# Patient Record
Sex: FEMALE | Race: WHITE | Employment: FULL TIME | URBAN - NONMETROPOLITAN AREA
[De-identification: names, ages, dates, MRNs, and addresses within clinical notes are randomized per-mention and may not be internally consistent; named-entity substitution may affect disease eponyms.]

---

## 2017-10-12 LAB
CHOLESTEROL, TOTAL: 229 MG/DL
CHOLESTEROL/HDL RATIO: NORMAL
HDLC SERPL-MCNC: 70 MG/DL (ref 35–70)
LDL CHOLESTEROL CALCULATED: 127 MG/DL (ref 0–160)
TRIGL SERPL-MCNC: 158 MG/DL
VLDLC SERPL CALC-MCNC: 32 MG/DL

## 2018-02-01 ENCOUNTER — HOSPITAL ENCOUNTER (EMERGENCY)
Age: 53
Discharge: HOME | End: 2018-02-01
Payer: COMMERCIAL

## 2018-02-01 VITALS
SYSTOLIC BLOOD PRESSURE: 135 MMHG | TEMPERATURE: 97.7 F | BODY MASS INDEX: 53.14 KG/M2 | HEART RATE: 65 BPM | WEIGHT: 293 LBS | RESPIRATION RATE: 16 BRPM | OXYGEN SATURATION: 97 % | DIASTOLIC BLOOD PRESSURE: 73 MMHG

## 2018-02-01 DIAGNOSIS — J20.9 ACUTE BRONCHITIS, UNSPECIFIED ORGANISM: Primary | ICD-10-CM

## 2018-02-01 PROCEDURE — 99202 OFFICE O/P NEW SF 15 MIN: CPT

## 2018-02-01 PROCEDURE — 99203 OFFICE O/P NEW LOW 30 MIN: CPT | Performed by: NURSE PRACTITIONER

## 2018-02-01 RX ORDER — AZITHROMYCIN 250 MG/1
TABLET, FILM COATED ORAL
Qty: 1 PACKET | Refills: 0 | Status: SHIPPED | OUTPATIENT
Start: 2018-02-01 | End: 2018-02-11

## 2018-02-01 RX ORDER — BENZONATATE 100 MG/1
100 CAPSULE ORAL 3 TIMES DAILY PRN
Qty: 21 CAPSULE | Refills: 0 | Status: SHIPPED | OUTPATIENT
Start: 2018-02-01 | End: 2018-02-08

## 2018-02-01 ASSESSMENT — ENCOUNTER SYMPTOMS
VOMITING: 0
SORE THROAT: 1
SHORTNESS OF BREATH: 0
SINUS PRESSURE: 1
DIARRHEA: 0
COUGH: 1

## 2018-02-01 ASSESSMENT — PAIN SCALES - GENERAL: PAINLEVEL_OUTOF10: 3

## 2018-02-01 NOTE — ED PROVIDER NOTES
MG TABLET    Take 500 mg by mouth 2 times daily (with meals)    ONDANSETRON (ZOFRAN) 4 MG TABLET    Take 1 tablet by mouth daily as needed for Nausea or Vomiting    PSEUDOEPHEDRINE (SUDAFED 12 HR) 120 MG EXTENDED RELEASE TABLET    Take 120 mg by mouth every 12 hours       ALLERGIES     Patient is is allergic to ampicillin; bactrim [sulfamethoxazole-trimethoprim]; and flagyl [metronidazole]. Patients   There is no immunization history on file for this patient. FAMILY HISTORY     Patient's family history is not on file. SOCIAL HISTORY     Patient  reports that she has never smoked. She has never used smokeless tobacco.    PHYSICAL EXAM     ED TRIAGE VITALS  BP: 135/73, Temp: 97.7 °F (36.5 °C), Pulse: 65, Resp: 16, SpO2: 97 %,Estimated body mass index is 53.14 kg/m² as calculated from the following:    Height as of 11/7/16: 5' 3\" (1.6 m). Weight as of this encounter: 300 lb (136.1 kg). ,Patient's last menstrual period was 01/22/2018. Physical Exam   Constitutional: She is oriented to person, place, and time. She appears well-developed and well-nourished. She is cooperative. She appears ill. HENT:   Head: Normocephalic and atraumatic. Right Ear: Hearing, tympanic membrane, external ear and ear canal normal.   Left Ear: Hearing, tympanic membrane, external ear and ear canal normal.   Nose: Right sinus exhibits no maxillary sinus tenderness and no frontal sinus tenderness. Left sinus exhibits no maxillary sinus tenderness and no frontal sinus tenderness. Mouth/Throat: Uvula is midline and mucous membranes are normal. Posterior oropharyngeal erythema present. Neck: Neck supple. Cardiovascular: Normal rate, regular rhythm and normal heart sounds. Pulmonary/Chest: Effort normal and breath sounds normal. No respiratory distress. She has no wheezes. Abdominal: Soft.    Lymphadenopathy:        Head (right side): No submental, no submandibular, no tonsillar, no preauricular, no posterior auricular and

## 2018-02-01 NOTE — ED TRIAGE NOTES
Pt states she has been sick for 3 wks getting better and worse, felt worse yesterday but couldn't come in

## 2018-02-02 ASSESSMENT — ENCOUNTER SYMPTOMS
NAUSEA: 1
SINUS PAIN: 0
CHEST TIGHTNESS: 1
RHINORRHEA: 0

## 2018-09-15 ENCOUNTER — HOSPITAL ENCOUNTER (EMERGENCY)
Age: 53
Discharge: HOME | End: 2018-09-15
Payer: COMMERCIAL

## 2018-09-15 ENCOUNTER — APPOINTMENT (OUTPATIENT)
Dept: CT IMAGING | Age: 53
End: 2018-09-15
Payer: COMMERCIAL

## 2018-09-15 ENCOUNTER — APPOINTMENT (OUTPATIENT)
Dept: GENERAL RADIOLOGY | Age: 53
End: 2018-09-15
Payer: COMMERCIAL

## 2018-09-15 VITALS
SYSTOLIC BLOOD PRESSURE: 124 MMHG | HEART RATE: 74 BPM | RESPIRATION RATE: 18 BRPM | TEMPERATURE: 98 F | OXYGEN SATURATION: 97 % | BODY MASS INDEX: 53.14 KG/M2 | DIASTOLIC BLOOD PRESSURE: 76 MMHG | WEIGHT: 293 LBS

## 2018-09-15 DIAGNOSIS — R00.2 PALPITATIONS: Primary | ICD-10-CM

## 2018-09-15 LAB
ALBUMIN SERPL-MCNC: 4.2 G/DL (ref 3.5–5.1)
ALP BLD-CCNC: 113 U/L (ref 38–126)
ALT SERPL-CCNC: 13 U/L (ref 11–66)
AMPHETAMINE+METHAMPHETAMINE URINE SCREEN: POSITIVE
ANION GAP SERPL CALCULATED.3IONS-SCNC: 16 MEQ/L (ref 8–16)
APTT: 27 SECONDS (ref 22–38)
AST SERPL-CCNC: 19 U/L (ref 5–40)
BACTERIA: ABNORMAL /HPF
BARBITURATE QUANTITATIVE URINE: NEGATIVE
BASOPHILS # BLD: 0.6 %
BASOPHILS ABSOLUTE: 0.1 THOU/MM3 (ref 0–0.1)
BENZODIAZEPINE QUANTITATIVE URINE: NEGATIVE
BILIRUB SERPL-MCNC: 0.2 MG/DL (ref 0.3–1.2)
BILIRUBIN URINE: ABNORMAL
BLOOD, URINE: NEGATIVE
BUN BLDV-MCNC: 14 MG/DL (ref 7–22)
CALCIUM SERPL-MCNC: 9.1 MG/DL (ref 8.5–10.5)
CANNABINOID QUANTITATIVE URINE: NEGATIVE
CASTS 2: ABNORMAL /LPF
CASTS UA: ABNORMAL /LPF
CHARACTER, URINE: ABNORMAL
CHLORIDE BLD-SCNC: 103 MEQ/L (ref 98–111)
CO2: 22 MEQ/L (ref 23–33)
COCAINE METABOLITE QUANTITATIVE URINE: NEGATIVE
COLOR: ABNORMAL
CREAT SERPL-MCNC: 0.7 MG/DL (ref 0.4–1.2)
CRYSTALS, UA: ABNORMAL
D-DIMER QUANTITATIVE: 6485 NG/ML FEU (ref 0–500)
EKG ATRIAL RATE: 109 BPM
EKG P AXIS: 59 DEGREES
EKG P-R INTERVAL: 176 MS
EKG Q-T INTERVAL: 332 MS
EKG QRS DURATION: 78 MS
EKG QTC CALCULATION (BAZETT): 447 MS
EKG R AXIS: -13 DEGREES
EKG T AXIS: 40 DEGREES
EKG VENTRICULAR RATE: 109 BPM
EOSINOPHIL # BLD: 1.3 %
EOSINOPHILS ABSOLUTE: 0.2 THOU/MM3 (ref 0–0.4)
EPITHELIAL CELLS, UA: ABNORMAL /HPF
ERYTHROCYTE [DISTWIDTH] IN BLOOD BY AUTOMATED COUNT: 13.5 % (ref 11.5–14.5)
ERYTHROCYTE [DISTWIDTH] IN BLOOD BY AUTOMATED COUNT: 43 FL (ref 35–45)
GFR SERPL CREATININE-BSD FRML MDRD: 87 ML/MIN/1.73M2
GLUCOSE BLD-MCNC: 101 MG/DL (ref 70–108)
GLUCOSE URINE: NEGATIVE MG/DL
HCT VFR BLD CALC: 42.4 % (ref 37–47)
HEMOGLOBIN: 14.1 GM/DL (ref 12–16)
ICTOTEST: NEGATIVE
IMMATURE GRANS (ABS): 0.05 THOU/MM3 (ref 0–0.07)
IMMATURE GRANULOCYTES: 0.4 %
KETONES, URINE: NEGATIVE
LEUKOCYTE ESTERASE, URINE: ABNORMAL
LYMPHOCYTES # BLD: 14.1 %
LYMPHOCYTES ABSOLUTE: 1.6 THOU/MM3 (ref 1–4.8)
MCH RBC QN AUTO: 29.1 PG (ref 26–33)
MCHC RBC AUTO-ENTMCNC: 33.3 GM/DL (ref 32.2–35.5)
MCV RBC AUTO: 87.6 FL (ref 81–99)
MISCELLANEOUS 2: ABNORMAL
MONOCYTES # BLD: 6 %
MONOCYTES ABSOLUTE: 0.7 THOU/MM3 (ref 0.4–1.3)
MUCUS: ABNORMAL
NITRITE, URINE: NEGATIVE
NUCLEATED RED BLOOD CELLS: 0 /100 WBC
OPIATES, URINE: NEGATIVE
OSMOLALITY CALCULATION: 281.9 MOSMOL/KG (ref 275–300)
OXYCODONE: NEGATIVE
PH UA: 5.5
PHENCYCLIDINE QUANTITATIVE URINE: NEGATIVE
PLATELET # BLD: 377 THOU/MM3 (ref 130–400)
PMV BLD AUTO: 9.2 FL (ref 9.4–12.4)
POTASSIUM SERPL-SCNC: 4.5 MEQ/L (ref 3.5–5.2)
PRO-BNP: 129.8 PG/ML (ref 0–900)
PROTEIN UA: 30
RBC # BLD: 4.84 MILL/MM3 (ref 4.2–5.4)
RBC URINE: ABNORMAL /HPF
RENAL EPITHELIAL, UA: ABNORMAL
SEG NEUTROPHILS: 77.6 %
SEGMENTED NEUTROPHILS ABSOLUTE COUNT: 9 THOU/MM3 (ref 1.8–7.7)
SODIUM BLD-SCNC: 141 MEQ/L (ref 135–145)
SPECIFIC GRAVITY, URINE: 1.02 (ref 1–1.03)
TOTAL PROTEIN: 7 G/DL (ref 6.1–8)
TROPONIN T: < 0.01 NG/ML
TSH SERPL DL<=0.05 MIU/L-ACNC: 2.92 UIU/ML (ref 0.4–4.2)
UROBILINOGEN, URINE: 0.2 EU/DL
WBC # BLD: 11.6 THOU/MM3 (ref 4.8–10.8)
WBC UA: ABNORMAL /HPF
YEAST: ABNORMAL

## 2018-09-15 PROCEDURE — 71045 X-RAY EXAM CHEST 1 VIEW: CPT

## 2018-09-15 PROCEDURE — 93005 ELECTROCARDIOGRAM TRACING: CPT | Performed by: PHYSICIAN ASSISTANT

## 2018-09-15 PROCEDURE — 2580000003 HC RX 258: Performed by: PHYSICIAN ASSISTANT

## 2018-09-15 PROCEDURE — 71275 CT ANGIOGRAPHY CHEST: CPT

## 2018-09-15 PROCEDURE — 83880 ASSAY OF NATRIURETIC PEPTIDE: CPT

## 2018-09-15 PROCEDURE — 93226 XTRNL ECG REC<48 HR SCAN A/R: CPT

## 2018-09-15 PROCEDURE — 84484 ASSAY OF TROPONIN QUANT: CPT

## 2018-09-15 PROCEDURE — 93225 XTRNL ECG REC<48 HRS REC: CPT

## 2018-09-15 PROCEDURE — 80053 COMPREHEN METABOLIC PANEL: CPT

## 2018-09-15 PROCEDURE — 99285 EMERGENCY DEPT VISIT HI MDM: CPT

## 2018-09-15 PROCEDURE — 80307 DRUG TEST PRSMV CHEM ANLYZR: CPT

## 2018-09-15 PROCEDURE — 85730 THROMBOPLASTIN TIME PARTIAL: CPT

## 2018-09-15 PROCEDURE — 81001 URINALYSIS AUTO W/SCOPE: CPT

## 2018-09-15 PROCEDURE — 6360000004 HC RX CONTRAST MEDICATION: Performed by: PHYSICIAN ASSISTANT

## 2018-09-15 PROCEDURE — 87086 URINE CULTURE/COLONY COUNT: CPT

## 2018-09-15 PROCEDURE — 85025 COMPLETE CBC W/AUTO DIFF WBC: CPT

## 2018-09-15 PROCEDURE — 36415 COLL VENOUS BLD VENIPUNCTURE: CPT

## 2018-09-15 PROCEDURE — 84443 ASSAY THYROID STIM HORMONE: CPT

## 2018-09-15 PROCEDURE — 85379 FIBRIN DEGRADATION QUANT: CPT

## 2018-09-15 RX ORDER — 0.9 % SODIUM CHLORIDE 0.9 %
1000 INTRAVENOUS SOLUTION INTRAVENOUS ONCE
Status: COMPLETED | OUTPATIENT
Start: 2018-09-15 | End: 2018-09-15

## 2018-09-15 RX ADMIN — IOPAMIDOL 80 ML: 755 INJECTION, SOLUTION INTRAVENOUS at 21:01

## 2018-09-15 RX ADMIN — SODIUM CHLORIDE 1000 ML: 9 INJECTION, SOLUTION INTRAVENOUS at 19:30

## 2018-09-15 ASSESSMENT — ENCOUNTER SYMPTOMS
COUGH: 0
EYE REDNESS: 0
RHINORRHEA: 0
WHEEZING: 0
VOMITING: 0
CONSTIPATION: 0
BACK PAIN: 0
SORE THROAT: 0
COLOR CHANGE: 0
DIARRHEA: 0
SHORTNESS OF BREATH: 0
EYE DISCHARGE: 0
ABDOMINAL PAIN: 0
NAUSEA: 0

## 2018-09-15 NOTE — ED TRIAGE NOTES
Patient was at work at about 0 patient felt hot and became diaphortic. Patient states she took her pulse it was in the 180's with only coming down to 125. Patient is alert and oriented. EKG completed. Call light in reach.

## 2018-09-15 NOTE — ED PROVIDER NOTES
for arthralgias, back pain, myalgias, neck pain and neck stiffness. Skin: Negative for color change, pallor and rash. Neurological: Negative for dizziness, syncope, weakness, light-headedness, numbness and headaches. Hematological: Negative for adenopathy. Psychiatric/Behavioral: Negative for agitation, confusion, dysphoric mood and suicidal ideas. The patient is not nervous/anxious. PAST MEDICAL HISTORY    has a past medical history of Hypertension. SURGICAL HISTORY      has a past surgical history that includes Cholecystectomy. CURRENT MEDICATIONS       Previous Medications    DESOGESTREL-ETHINYL ESTRADIOL (KARIVA) 0.15-0.02/0.01 MG (21/5) PER TABLET    Take 1 tablet by mouth daily    FAMOTIDINE (PEPCID) 40 MG TABLET    Take 40 mg by mouth 2 times daily    GUAIFENESIN (MUCINEX) 600 MG EXTENDED RELEASE TABLET    Take 1,200 mg by mouth 2 times daily    LISINOPRIL (PRINIVIL;ZESTRIL) 20 MG TABLET    Take 20 mg by mouth daily    NAPROXEN (NAPROSYN) 500 MG TABLET    Take 500 mg by mouth 2 times daily (with meals)    ONDANSETRON (ZOFRAN) 4 MG TABLET    Take 1 tablet by mouth daily as needed for Nausea or Vomiting    PSEUDOEPHEDRINE (SUDAFED 12 HR) 120 MG EXTENDED RELEASE TABLET    Take 120 mg by mouth every 12 hours       ALLERGIES     is allergic to ampicillin; bactrim [sulfamethoxazole-trimethoprim]; and flagyl [metronidazole]. FAMILY HISTORY     has no family status information on file. family history is not on file. SOCIAL HISTORY      reports that she has never smoked. She has never used smokeless tobacco.    PHYSICAL EXAM     INITIAL VITALS:  weight is 300 lb (136.1 kg). Her oral temperature is 98 °F (36.7 °C). Her blood pressure is 123/81 and her pulse is 76. Her respiration is 17 and oxygen saturation is 98%. Physical Exam   Constitutional: She is oriented to person, place, and time. She appears well-developed and well-nourished. No distress.    HENT:   Head: Normocephalic and

## 2018-09-16 LAB
ORGANISM: ABNORMAL
URINE CULTURE REFLEX: ABNORMAL

## 2018-09-16 PROCEDURE — 93010 ELECTROCARDIOGRAM REPORT: CPT | Performed by: INTERNAL MEDICINE

## 2018-09-16 NOTE — ED NOTES
Pt is resting quietly on cart with call light in reach. Pt is updated on POC and pending CTA. Will continue to monitor the patient.      Ros Olson RN  09/15/18 1163

## 2018-10-05 ENCOUNTER — OFFICE VISIT (OUTPATIENT)
Dept: CARDIOLOGY CLINIC | Age: 53
End: 2018-10-05
Payer: COMMERCIAL

## 2018-10-05 VITALS
HEIGHT: 63 IN | HEART RATE: 71 BPM | WEIGHT: 293 LBS | DIASTOLIC BLOOD PRESSURE: 86 MMHG | SYSTOLIC BLOOD PRESSURE: 132 MMHG | BODY MASS INDEX: 51.91 KG/M2

## 2018-10-05 DIAGNOSIS — I10 ESSENTIAL HYPERTENSION: ICD-10-CM

## 2018-10-05 DIAGNOSIS — R00.2 INTERMITTENT PALPITATIONS: ICD-10-CM

## 2018-10-05 DIAGNOSIS — R06.02 SOB (SHORTNESS OF BREATH) ON EXERTION: Primary | ICD-10-CM

## 2018-10-05 PROBLEM — E66.01 OBESITY, MORBID (HCC): Status: ACTIVE | Noted: 2018-10-05

## 2018-10-05 PROCEDURE — 99204 OFFICE O/P NEW MOD 45 MIN: CPT | Performed by: INTERNAL MEDICINE

## 2018-10-05 RX ORDER — FAMOTIDINE 20 MG/1
20 TABLET, FILM COATED ORAL EVERY MORNING
COMMUNITY
End: 2021-07-01 | Stop reason: ALTCHOICE

## 2018-10-05 NOTE — PROGRESS NOTES
Chief Complaint   Patient presents with    Results     holter [facility]    Establish Cardiologist   Pt here to discuss results of holter monitor  Seen in ER sept 15, for high HR/palpitation 180 bpm    Denied cp, sob or dizziness or edema    Taking psueofed qd for chronic sinusitis      Occasional palpitations    Sob on exertion    NO cp    nevere smoked    FHx    NO cad  Ankle had cad at age 35 with CABG      Patient Active Problem List   Diagnosis    Granulomatous hepatitis    SOB (shortness of breath) on exertion    Intermittent palpitations    Essential hypertension    Obesity, morbid ([address]       Past Surgical History:   Procedure Laterality Date    CHOLECYSTECTOMY         Allergies   Allergen Reactions    Ampicillin     Bactrim [Sulfamethoxazole-Trimethoprim]     Flagyl [Metronidazole]         History reviewed. No pertinent family history. Social History     Social History    Marital status:      Spouse name: N/A    Number of children: N/A    Years of education: N/A     Occupational History    Not on file.      Social History Main Topics    Smoking status: Never Smoker    Smokeless tobacco: Never Used    Alcohol use Not on file    Drug use: Unknown    Sexual activity: Not on file     Other Topics Concern    Not on file     Social History Narrative    No narrative on file       Current Outpatient Prescriptions   Medication Sig Dispense Refill    famotidine (PEPCID) 20 MG tablet Take 20 mg by mouth every morning      lisinopril (PRINIVIL;ZESTRIL) 20 MG tablet Take 20 mg by mouth daily      naproxen (NAPROSYN) 500 MG tablet Take 500 mg by mouth 2 times daily (with meals)      pseudoephedrine (SUDAFED 12 HR) 120 MG extended release tablet Take 120 mg by mouth every 12 hours      desogestrel-ethinyl estradiol (KARIVA) 0.15-0.02/0.01 MG (21/5) per tablet Take 1 tablet by mouth daily      famotidine (PEPCID) 40 MG tablet Take 40 mg by mouth nightly       guaiFENesin (MUCINEX) 600 MG 09/15/2018    BUN 14 09/15/2018    LABALBU 4.2 09/15/2018    CREATININE 0.7 09/15/2018    CALCIUM 9.1 09/15/2018    LABGLOM 87 09/15/2018    GLUCOSE 101 09/15/2018     Hepatic Function Panel:    Lab Results   Component Value Date    ALKPHOS 113 09/15/2018    ALT 13 09/15/2018    AST 19 09/15/2018    PROT 7.0 09/15/2018    BILITOT 0.2 09/15/2018    LABALBU 4.2 09/15/2018     Magnesium:  No results found for: MG  Warfarin PT/INR:  No components found for: PTPATWAR, PTINRWAR  HgBA1c:  No results found for: LABA1C  FLP:  No results found for: TRIG, HDL, LDLCALC, LDLDIRECT, LABVLDL  TSH:    Lab Results   Component Value Date    TSH 2.920 09/15/2018     Sinus tachycardia 109/min  Low voltage QRS, consider pulmonary disease, pericardial effusion, or normal variant  Borderline ECG  No previous ECGs available  Confirmed by Landon Sullivan MD, Yajaira Champion ([id number]) on 9/16/2018 6:57:51 PM         CONCLUSION:  This is a normal sinus rhythm. Average heart rate 82 beats  per minute, ranging from 49 to 150 beats per minute. No significant pause  of more than 1.5 seconds noted. Rare ventricular ectopic beats are  isolated and couplets total of 24. Rare supraventricular ectopic beats,  total of 134 isolated and one supraventricular ectopic run composed of 3  beats, rate 168 beats per minute. No other form of arrhythmia. Overall,  this is a benign Holter monitor finding. Diary was presented. This Holter  does not explain the palpitation of the patient.     DEEJAY Sullivan M.D.     D: 09/24/2018 18:39:40       Assessment   Diagnosis Orders   1. SOB (shortness of breath) on exertion  ECHO Complete 2D W Doppler W Color    NM MYOCARDIAL SPECT REST EXERCISE OR RX   2. Intermittent palpitations  ECHO Complete 2D W Doppler W Color    NM MYOCARDIAL SPECT REST EXERCISE OR RX   3. Essential hypertension  ECHO Complete 2D W Doppler W Color    NM MYOCARDIAL SPECT REST EXERCISE OR RX   4.  Obesity, morbid ([address]  NM MYOCARDIAL SPECT REST EXERCISE OR RX

## 2018-10-15 ENCOUNTER — HOSPITAL ENCOUNTER (OUTPATIENT)
Dept: NON INVASIVE DIAGNOSTICS | Age: 53
Discharge: HOME | End: 2018-10-15
Payer: COMMERCIAL

## 2018-10-15 VITALS — BODY MASS INDEX: 51.91 KG/M2 | WEIGHT: 293 LBS | HEIGHT: 63 IN

## 2018-10-15 DIAGNOSIS — I10 ESSENTIAL HYPERTENSION: ICD-10-CM

## 2018-10-15 DIAGNOSIS — R06.02 SOB (SHORTNESS OF BREATH) ON EXERTION: ICD-10-CM

## 2018-10-15 DIAGNOSIS — R00.2 INTERMITTENT PALPITATIONS: ICD-10-CM

## 2018-10-15 LAB
LV EF: 63 %
LV EF: 73 %
LVEF MODALITY: NORMAL
LVEF MODALITY: NORMAL

## 2018-10-15 PROCEDURE — A9500 TC99M SESTAMIBI: HCPCS | Performed by: INTERNAL MEDICINE

## 2018-10-15 PROCEDURE — 93306 TTE W/DOPPLER COMPLETE: CPT

## 2018-10-15 PROCEDURE — 2709999900 HC NON-CHARGEABLE SUPPLY

## 2018-10-15 PROCEDURE — 3430000000 HC RX DIAGNOSTIC RADIOPHARMACEUTICAL: Performed by: INTERNAL MEDICINE

## 2018-10-15 PROCEDURE — 93017 CV STRESS TEST TRACING ONLY: CPT

## 2018-10-15 PROCEDURE — 78452 HT MUSCLE IMAGE SPECT MULT: CPT

## 2018-10-15 RX ADMIN — Medication 9.4 MILLICURIE: at 09:55

## 2018-10-15 RX ADMIN — Medication 31.7 MILLICURIE: at 11:06

## 2018-11-12 ENCOUNTER — OFFICE VISIT (OUTPATIENT)
Dept: CARDIOLOGY CLINIC | Age: 53
End: 2018-11-12
Payer: COMMERCIAL

## 2018-11-12 VITALS
WEIGHT: 293 LBS | BODY MASS INDEX: 51.91 KG/M2 | DIASTOLIC BLOOD PRESSURE: 74 MMHG | HEIGHT: 63 IN | HEART RATE: 66 BPM | SYSTOLIC BLOOD PRESSURE: 129 MMHG

## 2018-11-12 DIAGNOSIS — R00.2 INTERMITTENT PALPITATIONS: Primary | ICD-10-CM

## 2018-11-12 DIAGNOSIS — Z01.818 PRE-OP EVALUATION: ICD-10-CM

## 2018-11-12 DIAGNOSIS — E66.01 OBESITY, MORBID (HCC): ICD-10-CM

## 2018-11-12 DIAGNOSIS — I10 ESSENTIAL HYPERTENSION: ICD-10-CM

## 2018-11-12 PROBLEM — R06.02 SOB (SHORTNESS OF BREATH) ON EXERTION: Status: RESOLVED | Noted: 2018-10-05 | Resolved: 2018-11-12

## 2018-11-12 PROCEDURE — 99213 OFFICE O/P EST LOW 20 MIN: CPT | Performed by: INTERNAL MEDICINE

## 2018-12-04 ENCOUNTER — ANESTHESIA EVENT (OUTPATIENT)
Dept: OPERATING ROOM | Age: 53
End: 2018-12-04
Payer: COMMERCIAL

## 2018-12-05 NOTE — ANESTHESIA PRE PROCEDURE
Department of Anesthesiology  Preprocedure Note       Name:  Bob Burch   Age:  46 y.o.  :  1965                                          MRN:  [id number]         Date:  2018      Surgeon: Kt Guevara):  [place] Mera MD    Procedure: SINUS ENDOSCOPY FUNCTIONAL SURGERY BILATERAL MAXILLARY ANSTROSTOMY WITH TISSUE REMOVAL (Bilateral )  SUBMUCOUS RESECTION TURBINOPLASTY  BILATERAL (Bilateral )    Medications prior to admission:   Prior to Admission medications    Medication Sig Start Date End Date Taking?  Authorizing Provider   famotidine (PEPCID) 20 MG tablet Take 20 mg by mouth every morning    Historical Provider, MD   lisinopril (PRINIVIL;ZESTRIL) 20 MG tablet Take 20 mg by mouth daily    Historical Provider, MD   naproxen (NAPROSYN) 500 MG tablet Take 500 mg by mouth 2 times daily (with meals)    Historical Provider, MD   pseudoephedrine (SUDAFED 12 HR) 120 MG extended release tablet Take 120 mg by mouth daily     Historical Provider, MD   desogestrel-ethinyl estradiol (Yung Olman) 0.15-0.02/0.01 MG () per tablet Take 1 tablet by mouth daily    Historical Provider, MD   famotidine (PEPCID) 40 MG tablet Take 40 mg by mouth nightly     Historical Provider, MD   guaiFENesin (MUCINEX) 600 MG extended release tablet Take 1,200 mg by mouth 2 times daily    Historical Provider, MD       Current medications:    Current Outpatient Prescriptions   Medication Sig Dispense Refill    famotidine (PEPCID) 20 MG tablet Take 20 mg by mouth every morning      lisinopril (PRINIVIL;ZESTRIL) 20 MG tablet Take 20 mg by mouth daily      naproxen (NAPROSYN) 500 MG tablet Take 500 mg by mouth 2 times daily (with meals)      pseudoephedrine (SUDAFED 12 HR) 120 MG extended release tablet Take 120 mg by mouth daily       desogestrel-ethinyl estradiol (KARIVA) 0.15-0.02/0.01 MG () per tablet Take 1 tablet by mouth daily      famotidine (PEPCID) 40 MG tablet Take 40 mg by mouth nightly       guaiFENesin (MUCINEX) 600 Dental:          Pulmonary:   (+) pneumonia (2017, bronchitis 2018):  shortness of breath:                            ROS comment: Sinus infections x 30 years. Positive  Meth-Amphetamine screen 2018. Reports has taken pseudoephedrine for years and drug screening will always be positive. 2018 negative drug screen     Non smoker   Cardiovascular:    (+) hypertension:, dysrhythmias (hx palpitations. ):, IBRAHIM:,       ECG reviewed      Echocardiogram reviewed  Stress test reviewed  Cleared by cardiology           ROS comment: Echo 10/2018  EF 50-65%   Normal left ventricle size and systolic function. There were no regional left ventricular wall  motion abnormalities and wall thickness was within normal limits.   The left atrium is Mildly dilated. GXT stress 10/2018   No chest pain during the stress.   No stress induced arrhythmia.   Exercise capacity: Poor  METS: 4.7   Exercise EKG stress test is not suggestive for ischemia. EK2018  Poor data quality, interpretation may be adversely affected  Sinus tachycardia  Low voltage QRS, consider pulmonary disease, pericardial effusion, or normal variant  Borderline ECG  No previous ECGs available    Holter 2018 for hx palpitations  Benign Holter monitor finding. Diary was presented. This Holter does not explain the palpitation of the patient. Cardiac risk assessment low to mod risk per Dr. Lizzeth Stearns     Neuro/Psych:   (+) psychiatric history:depression/anxiety             GI/Hepatic/Renal:   (+) GERD: well controlled, hepatitis (Granulomatous hepatitis onset , flare  and last 2017. Treated with steroids. ):, liver disease (elevated LFTS ):, morbid obesity (BMI > 54)         ROS comment: S/p iraida, . Endo/Other:              Pt had PAT visit.         ROS comment: LMP: 10/19/2018 Abdominal:           Vascular:                            Anesthesia Plan        Naima Georges, CONCHITA - CNP Anesthesia Evaluation will follow by a certified

## 2018-12-06 ENCOUNTER — HOSPITAL ENCOUNTER (OUTPATIENT)
Dept: PREADMISSION TESTING | Age: 53
Discharge: HOME | End: 2018-12-10
Payer: COMMERCIAL

## 2018-12-06 VITALS
BODY MASS INDEX: 53.92 KG/M2 | TEMPERATURE: 97.2 F | RESPIRATION RATE: 16 BRPM | WEIGHT: 293 LBS | HEIGHT: 62 IN | OXYGEN SATURATION: 99 % | HEART RATE: 72 BPM | DIASTOLIC BLOOD PRESSURE: 71 MMHG | SYSTOLIC BLOOD PRESSURE: 143 MMHG

## 2018-12-06 LAB
AMPHETAMINES: NEGATIVE
ANION GAP SERPL CALCULATED.3IONS-SCNC: 11 MMOL/L (ref 4–16)
BARBITURATE SCREEN URINE: NEGATIVE
BENZODIAZEPINE SCREEN, URINE: NEGATIVE
BUN BLDV-MCNC: 15 MG/DL (ref 6–23)
CALCIUM SERPL-MCNC: 8.7 MG/DL (ref 8.3–10.6)
CANNABINOID SCREEN URINE: NEGATIVE
CHLORIDE BLD-SCNC: 103 MMOL/L (ref 99–110)
CO2: 24 MMOL/L (ref 21–32)
COCAINE METABOLITE: NEGATIVE
CREAT SERPL-MCNC: 0.6 MG/DL (ref 0.6–1.1)
GFR AFRICAN AMERICAN: >60 ML/MIN/1.73M2
GFR NON-AFRICAN AMERICAN: >60 ML/MIN/1.73M2
GLUCOSE BLD-MCNC: 91 MG/DL (ref 70–99)
HCT VFR BLD CALC: 40.6 % (ref 37–47)
HEMOGLOBIN: 12.9 GM/DL (ref 12.5–16)
MCH RBC QN AUTO: 29.9 PG (ref 27–31)
MCHC RBC AUTO-ENTMCNC: 31.8 % (ref 32–36)
MCV RBC AUTO: 94 FL (ref 78–100)
OPIATES, URINE: NEGATIVE
OXYCODONE: NEGATIVE
PDW BLD-RTO: 13.1 % (ref 11.7–14.9)
PHENCYCLIDINE, URINE: NEGATIVE
PLATELET # BLD: 340 K/CU MM (ref 140–440)
PMV BLD AUTO: 8.9 FL (ref 7.5–11.1)
POTASSIUM SERPL-SCNC: 4 MMOL/L (ref 3.5–5.1)
RBC # BLD: 4.32 M/CU MM (ref 4.2–5.4)
SODIUM BLD-SCNC: 138 MMOL/L (ref 135–145)
WBC # BLD: 9.3 K/CU MM (ref 4–10.5)

## 2018-12-06 PROCEDURE — 80048 BASIC METABOLIC PNL TOTAL CA: CPT

## 2018-12-06 PROCEDURE — 85027 COMPLETE CBC AUTOMATED: CPT

## 2018-12-06 PROCEDURE — 80307 DRUG TEST PRSMV CHEM ANLYZR: CPT

## 2018-12-06 RX ORDER — TRAMADOL HYDROCHLORIDE 50 MG/1
50 TABLET ORAL PRN
COMMUNITY
End: 2019-07-05 | Stop reason: SDUPTHER

## 2018-12-06 RX ORDER — MOMETASONE FUROATE 50 UG/1
2 SPRAY, METERED NASAL EVERY MORNING
COMMUNITY
End: 2019-12-20

## 2018-12-06 ASSESSMENT — ENCOUNTER SYMPTOMS: SHORTNESS OF BREATH: 1

## 2018-12-12 PROBLEM — Z01.818 PRE-OP EVALUATION: Status: RESOLVED | Noted: 2018-11-12 | Resolved: 2018-12-12

## 2018-12-13 ENCOUNTER — ANESTHESIA (OUTPATIENT)
Dept: OPERATING ROOM | Age: 53
End: 2018-12-13
Payer: COMMERCIAL

## 2018-12-13 ENCOUNTER — HOSPITAL ENCOUNTER (OUTPATIENT)
Age: 53
Setting detail: OUTPATIENT SURGERY
Discharge: HOME | End: 2018-12-13
Attending: OTOLARYNGOLOGY | Admitting: OTOLARYNGOLOGY
Payer: COMMERCIAL

## 2018-12-13 VITALS
RESPIRATION RATE: 16 BRPM | OXYGEN SATURATION: 96 % | TEMPERATURE: 97.8 F | SYSTOLIC BLOOD PRESSURE: 130 MMHG | DIASTOLIC BLOOD PRESSURE: 67 MMHG | HEART RATE: 72 BPM

## 2018-12-13 VITALS
DIASTOLIC BLOOD PRESSURE: 81 MMHG | TEMPERATURE: 98 F | OXYGEN SATURATION: 98 % | SYSTOLIC BLOOD PRESSURE: 144 MMHG | RESPIRATION RATE: 22 BRPM

## 2018-12-13 PROBLEM — J32.0 MAXILLARY SINUSITIS, CHRONIC: Chronic | Status: ACTIVE | Noted: 2018-12-13

## 2018-12-13 PROCEDURE — 88312 SPECIAL STAINS GROUP 1: CPT

## 2018-12-13 PROCEDURE — 88305 TISSUE EXAM BY PATHOLOGIST: CPT

## 2018-12-13 PROCEDURE — 7100000011 HC PHASE II RECOVERY - ADDTL 15 MIN: Performed by: OTOLARYNGOLOGY

## 2018-12-13 PROCEDURE — 3700000000 HC ANESTHESIA ATTENDED CARE: Performed by: OTOLARYNGOLOGY

## 2018-12-13 PROCEDURE — 3600000004 HC SURGERY LEVEL 4 BASE: Performed by: OTOLARYNGOLOGY

## 2018-12-13 PROCEDURE — 2709999900 HC NON-CHARGEABLE SUPPLY: Performed by: OTOLARYNGOLOGY

## 2018-12-13 PROCEDURE — 2500000003 HC RX 250 WO HCPCS: Performed by: OTOLARYNGOLOGY

## 2018-12-13 PROCEDURE — 7100000000 HC PACU RECOVERY - FIRST 15 MIN: Performed by: OTOLARYNGOLOGY

## 2018-12-13 PROCEDURE — 6370000000 HC RX 637 (ALT 250 FOR IP): Performed by: OTOLARYNGOLOGY

## 2018-12-13 PROCEDURE — 2720000010 HC SURG SUPPLY STERILE: Performed by: OTOLARYNGOLOGY

## 2018-12-13 PROCEDURE — 6360000002 HC RX W HCPCS: Performed by: OTOLARYNGOLOGY

## 2018-12-13 PROCEDURE — 2580000003 HC RX 258: Performed by: ANESTHESIOLOGY

## 2018-12-13 PROCEDURE — 2580000003 HC RX 258: Performed by: NURSE ANESTHETIST, CERTIFIED REGISTERED

## 2018-12-13 PROCEDURE — 87186 SC STD MICRODIL/AGAR DIL: CPT

## 2018-12-13 PROCEDURE — 87071 CULTURE AEROBIC QUANT OTHER: CPT

## 2018-12-13 PROCEDURE — 6360000002 HC RX W HCPCS: Performed by: ANESTHESIOLOGY

## 2018-12-13 PROCEDURE — 87077 CULTURE AEROBIC IDENTIFY: CPT

## 2018-12-13 PROCEDURE — 87205 SMEAR GRAM STAIN: CPT

## 2018-12-13 PROCEDURE — 7100000001 HC PACU RECOVERY - ADDTL 15 MIN: Performed by: OTOLARYNGOLOGY

## 2018-12-13 PROCEDURE — 3700000001 HC ADD 15 MINUTES (ANESTHESIA): Performed by: OTOLARYNGOLOGY

## 2018-12-13 PROCEDURE — 6370000000 HC RX 637 (ALT 250 FOR IP)

## 2018-12-13 PROCEDURE — 87073 CULTURE BACTERIA ANAEROBIC: CPT

## 2018-12-13 PROCEDURE — 2500000003 HC RX 250 WO HCPCS: Performed by: NURSE ANESTHETIST, CERTIFIED REGISTERED

## 2018-12-13 PROCEDURE — 7100000010 HC PHASE II RECOVERY - FIRST 15 MIN: Performed by: OTOLARYNGOLOGY

## 2018-12-13 PROCEDURE — 6360000002 HC RX W HCPCS: Performed by: NURSE ANESTHETIST, CERTIFIED REGISTERED

## 2018-12-13 PROCEDURE — 88311 DECALCIFY TISSUE: CPT

## 2018-12-13 PROCEDURE — 3600000014 HC SURGERY LEVEL 4 ADDTL 15MIN: Performed by: OTOLARYNGOLOGY

## 2018-12-13 RX ORDER — LABETALOL HYDROCHLORIDE 5 MG/ML
5 INJECTION, SOLUTION INTRAVENOUS EVERY 10 MIN PRN
Status: DISCONTINUED | OUTPATIENT
Start: 2018-12-13 | End: 2018-12-13 | Stop reason: HOSPADM

## 2018-12-13 RX ORDER — GLYCOPYRROLATE 1 MG/5 ML
SYRINGE (ML) INTRAVENOUS PRN
Status: DISCONTINUED | OUTPATIENT
Start: 2018-12-13 | End: 2018-12-13 | Stop reason: SDUPTHER

## 2018-12-13 RX ORDER — TRIAMCINOLONE ACETONIDE 40 MG/ML
INJECTION, SUSPENSION INTRA-ARTICULAR; INTRAMUSCULAR
Status: COMPLETED | OUTPATIENT
Start: 2018-12-13 | End: 2018-12-13

## 2018-12-13 RX ORDER — LIDOCAINE HYDROCHLORIDE 20 MG/ML
INJECTION, SOLUTION INFILTRATION; PERINEURAL PRN
Status: DISCONTINUED | OUTPATIENT
Start: 2018-12-13 | End: 2018-12-13 | Stop reason: SDUPTHER

## 2018-12-13 RX ORDER — HYDRALAZINE HYDROCHLORIDE 20 MG/ML
5 INJECTION INTRAMUSCULAR; INTRAVENOUS EVERY 10 MIN PRN
Status: DISCONTINUED | OUTPATIENT
Start: 2018-12-13 | End: 2018-12-13 | Stop reason: HOSPADM

## 2018-12-13 RX ORDER — MIDAZOLAM HYDROCHLORIDE 1 MG/ML
INJECTION INTRAMUSCULAR; INTRAVENOUS PRN
Status: DISCONTINUED | OUTPATIENT
Start: 2018-12-13 | End: 2018-12-13 | Stop reason: SDUPTHER

## 2018-12-13 RX ORDER — HYDROMORPHONE HCL 110MG/55ML
0.5 PATIENT CONTROLLED ANALGESIA SYRINGE INTRAVENOUS EVERY 5 MIN PRN
Status: DISCONTINUED | OUTPATIENT
Start: 2018-12-13 | End: 2018-12-13 | Stop reason: HOSPADM

## 2018-12-13 RX ORDER — PROPOFOL 10 MG/ML
INJECTION, EMULSION INTRAVENOUS PRN
Status: DISCONTINUED | OUTPATIENT
Start: 2018-12-13 | End: 2018-12-13 | Stop reason: SDUPTHER

## 2018-12-13 RX ORDER — DEXAMETHASONE SODIUM PHOSPHATE 4 MG/ML
INJECTION, SOLUTION INTRA-ARTICULAR; INTRALESIONAL; INTRAMUSCULAR; INTRAVENOUS; SOFT TISSUE PRN
Status: DISCONTINUED | OUTPATIENT
Start: 2018-12-13 | End: 2018-12-13 | Stop reason: SDUPTHER

## 2018-12-13 RX ORDER — ACETAMINOPHEN 10 MG/ML
1000 INJECTION, SOLUTION INTRAVENOUS ONCE
Status: COMPLETED | OUTPATIENT
Start: 2018-12-13 | End: 2018-12-13

## 2018-12-13 RX ORDER — FENTANYL CITRATE 50 UG/ML
INJECTION, SOLUTION INTRAMUSCULAR; INTRAVENOUS PRN
Status: DISCONTINUED | OUTPATIENT
Start: 2018-12-13 | End: 2018-12-13 | Stop reason: SDUPTHER

## 2018-12-13 RX ORDER — MEPERIDINE HYDROCHLORIDE 25 MG/ML
12.5 INJECTION INTRAMUSCULAR; INTRAVENOUS; SUBCUTANEOUS EVERY 5 MIN PRN
Status: DISCONTINUED | OUTPATIENT
Start: 2018-12-13 | End: 2018-12-13 | Stop reason: HOSPADM

## 2018-12-13 RX ORDER — LIDOCAINE HYDROCHLORIDE AND EPINEPHRINE 10; 10 MG/ML; UG/ML
INJECTION, SOLUTION INFILTRATION; PERINEURAL
Status: COMPLETED | OUTPATIENT
Start: 2018-12-13 | End: 2018-12-13

## 2018-12-13 RX ORDER — ROCURONIUM BROMIDE 10 MG/ML
INJECTION, SOLUTION INTRAVENOUS PRN
Status: DISCONTINUED | OUTPATIENT
Start: 2018-12-13 | End: 2018-12-13 | Stop reason: SDUPTHER

## 2018-12-13 RX ORDER — ECHINACEA PURPUREA EXTRACT 125 MG
2 TABLET ORAL
Qty: 1 BOTTLE | Refills: 3 | COMMUNITY
Start: 2018-12-13 | End: 2019-04-27

## 2018-12-13 RX ORDER — ONDANSETRON 2 MG/ML
INJECTION INTRAMUSCULAR; INTRAVENOUS PRN
Status: DISCONTINUED | OUTPATIENT
Start: 2018-12-13 | End: 2018-12-13 | Stop reason: SDUPTHER

## 2018-12-13 RX ORDER — FENTANYL CITRATE 50 UG/ML
50 INJECTION, SOLUTION INTRAMUSCULAR; INTRAVENOUS EVERY 5 MIN PRN
Status: DISCONTINUED | OUTPATIENT
Start: 2018-12-13 | End: 2018-12-13 | Stop reason: HOSPADM

## 2018-12-13 RX ORDER — CLINDAMYCIN PHOSPHATE 150 MG/ML
INJECTION, SOLUTION INTRAVENOUS PRN
Status: DISCONTINUED | OUTPATIENT
Start: 2018-12-13 | End: 2018-12-13 | Stop reason: SDUPTHER

## 2018-12-13 RX ORDER — CLARITHROMYCIN 500 MG/1
500 TABLET, COATED ORAL 2 TIMES DAILY
Qty: 20 TABLET | Refills: 0 | Status: SHIPPED | OUTPATIENT
Start: 2018-12-13 | End: 2018-12-23

## 2018-12-13 RX ORDER — PROMETHAZINE HYDROCHLORIDE 25 MG/ML
6.25 INJECTION, SOLUTION INTRAMUSCULAR; INTRAVENOUS
Status: DISCONTINUED | OUTPATIENT
Start: 2018-12-13 | End: 2018-12-13 | Stop reason: HOSPADM

## 2018-12-13 RX ORDER — NEOSTIGMINE METHYLSULFATE 5 MG/5 ML
SYRINGE (ML) INTRAVENOUS PRN
Status: DISCONTINUED | OUTPATIENT
Start: 2018-12-13 | End: 2018-12-13 | Stop reason: SDUPTHER

## 2018-12-13 RX ORDER — DIPHENHYDRAMINE HYDROCHLORIDE 50 MG/ML
12.5 INJECTION INTRAMUSCULAR; INTRAVENOUS
Status: DISCONTINUED | OUTPATIENT
Start: 2018-12-13 | End: 2018-12-13 | Stop reason: HOSPADM

## 2018-12-13 RX ORDER — PROMETHAZINE HYDROCHLORIDE 25 MG/ML
6.25 INJECTION, SOLUTION INTRAMUSCULAR; INTRAVENOUS ONCE
Status: COMPLETED | OUTPATIENT
Start: 2018-12-13 | End: 2018-12-13

## 2018-12-13 RX ORDER — VASOPRESSIN 20 U/ML
INJECTION PARENTERAL PRN
Status: DISCONTINUED | OUTPATIENT
Start: 2018-12-13 | End: 2018-12-13 | Stop reason: SDUPTHER

## 2018-12-13 RX ORDER — SODIUM CHLORIDE, SODIUM LACTATE, POTASSIUM CHLORIDE, CALCIUM CHLORIDE 600; 310; 30; 20 MG/100ML; MG/100ML; MG/100ML; MG/100ML
INJECTION, SOLUTION INTRAVENOUS CONTINUOUS
Status: DISCONTINUED | OUTPATIENT
Start: 2018-12-13 | End: 2018-12-13 | Stop reason: HOSPADM

## 2018-12-13 RX ADMIN — FENTANYL CITRATE 100 MCG: 50 INJECTION INTRAMUSCULAR; INTRAVENOUS at 08:23

## 2018-12-13 RX ADMIN — VASOPRESSIN 1 UNITS: 20 INJECTION INTRAVENOUS at 09:05

## 2018-12-13 RX ADMIN — Medication 0.6 MG: at 10:04

## 2018-12-13 RX ADMIN — PHENYLEPHRINE HYDROCHLORIDE 150 MCG: 10 INJECTION INTRAVENOUS at 08:38

## 2018-12-13 RX ADMIN — PHENYLEPHRINE HYDROCHLORIDE 50 MCG/MIN: 10 INJECTION INTRAVENOUS at 09:35

## 2018-12-13 RX ADMIN — FENTANYL CITRATE 100 MCG: 50 INJECTION INTRAMUSCULAR; INTRAVENOUS at 07:55

## 2018-12-13 RX ADMIN — ROCURONIUM BROMIDE 50 MG: 50 INJECTION, SOLUTION INTRAVENOUS at 07:55

## 2018-12-13 RX ADMIN — VASOPRESSIN 1 UNITS: 20 INJECTION INTRAVENOUS at 09:11

## 2018-12-13 RX ADMIN — PROMETHAZINE HYDROCHLORIDE 6.25 MG: 25 INJECTION INTRAMUSCULAR; INTRAVENOUS at 11:59

## 2018-12-13 RX ADMIN — PROPOFOL 200 MG: 10 INJECTION, EMULSION INTRAVENOUS at 07:55

## 2018-12-13 RX ADMIN — MIDAZOLAM HYDROCHLORIDE 2 MG: 1 INJECTION, SOLUTION INTRAMUSCULAR; INTRAVENOUS at 07:50

## 2018-12-13 RX ADMIN — FENTANYL CITRATE 50 MCG: 50 INJECTION INTRAMUSCULAR; INTRAVENOUS at 10:15

## 2018-12-13 RX ADMIN — PROPOFOL 20 MG: 10 INJECTION, EMULSION INTRAVENOUS at 10:15

## 2018-12-13 RX ADMIN — PHENYLEPHRINE HYDROCHLORIDE 150 MCG: 10 INJECTION INTRAVENOUS at 08:46

## 2018-12-13 RX ADMIN — LIDOCAINE HYDROCHLORIDE 100 MG: 20 INJECTION, SOLUTION INFILTRATION; PERINEURAL at 07:55

## 2018-12-13 RX ADMIN — Medication 3 MG: at 10:04

## 2018-12-13 RX ADMIN — VASOPRESSIN 1 UNITS: 20 INJECTION INTRAVENOUS at 08:59

## 2018-12-13 RX ADMIN — VASOPRESSIN 1 UNITS: 20 INJECTION INTRAVENOUS at 09:26

## 2018-12-13 RX ADMIN — PHENYLEPHRINE HYDROCHLORIDE 100 MCG: 10 INJECTION INTRAVENOUS at 08:35

## 2018-12-13 RX ADMIN — ACETAMINOPHEN 1000 MG: 10 INJECTION, SOLUTION INTRAVENOUS at 08:27

## 2018-12-13 RX ADMIN — PHENYLEPHRINE HYDROCHLORIDE 50 MCG: 10 INJECTION INTRAVENOUS at 09:26

## 2018-12-13 RX ADMIN — FENTANYL CITRATE 50 MCG: 50 INJECTION INTRAMUSCULAR; INTRAVENOUS at 10:02

## 2018-12-13 RX ADMIN — PHENYLEPHRINE HYDROCHLORIDE 100 MCG: 10 INJECTION INTRAVENOUS at 08:29

## 2018-12-13 RX ADMIN — DEXAMETHASONE SODIUM PHOSPHATE 8 MG: 4 INJECTION, SOLUTION INTRAMUSCULAR; INTRAVENOUS at 08:10

## 2018-12-13 RX ADMIN — VASOPRESSIN 1 UNITS: 20 INJECTION INTRAVENOUS at 09:17

## 2018-12-13 RX ADMIN — PHENYLEPHRINE HYDROCHLORIDE 50 MCG: 10 INJECTION INTRAVENOUS at 08:10

## 2018-12-13 RX ADMIN — PHENYLEPHRINE HYDROCHLORIDE 100 MCG: 10 INJECTION INTRAVENOUS at 09:35

## 2018-12-13 RX ADMIN — SODIUM CHLORIDE, POTASSIUM CHLORIDE, SODIUM LACTATE AND CALCIUM CHLORIDE: 600; 310; 30; 20 INJECTION, SOLUTION INTRAVENOUS at 08:45

## 2018-12-13 RX ADMIN — PHENYLEPHRINE HYDROCHLORIDE 150 MCG: 10 INJECTION INTRAVENOUS at 08:49

## 2018-12-13 RX ADMIN — Medication 0.1 MG: at 08:59

## 2018-12-13 RX ADMIN — Medication 0.1 MG: at 09:05

## 2018-12-13 RX ADMIN — SODIUM CHLORIDE, POTASSIUM CHLORIDE, SODIUM LACTATE AND CALCIUM CHLORIDE: 600; 310; 30; 20 INJECTION, SOLUTION INTRAVENOUS at 07:00

## 2018-12-13 RX ADMIN — CLINDAMYCIN PHOSPHATE 600 MG: 150 INJECTION, SOLUTION INTRAVENOUS at 08:12

## 2018-12-13 RX ADMIN — PHENYLEPHRINE HYDROCHLORIDE 150 MCG: 10 INJECTION INTRAVENOUS at 08:52

## 2018-12-13 RX ADMIN — PHENYLEPHRINE HYDROCHLORIDE 150 MCG: 10 INJECTION INTRAVENOUS at 08:43

## 2018-12-13 RX ADMIN — ONDANSETRON HYDROCHLORIDE 4 MG: 2 SOLUTION INTRAMUSCULAR; INTRAVENOUS at 10:00

## 2018-12-13 ASSESSMENT — PULMONARY FUNCTION TESTS
PIF_VALUE: 22
PIF_VALUE: 2
PIF_VALUE: 22
PIF_VALUE: 22
PIF_VALUE: 21
PIF_VALUE: 24
PIF_VALUE: 19
PIF_VALUE: 1
PIF_VALUE: 21
PIF_VALUE: 21
PIF_VALUE: 34
PIF_VALUE: 21
PIF_VALUE: 22
PIF_VALUE: 22
PIF_VALUE: 21
PIF_VALUE: 24
PIF_VALUE: 22
PIF_VALUE: 24
PIF_VALUE: 19
PIF_VALUE: 24
PIF_VALUE: 22
PIF_VALUE: 21
PIF_VALUE: 25
PIF_VALUE: 24
PIF_VALUE: 21
PIF_VALUE: 22
PIF_VALUE: 21
PIF_VALUE: 24
PIF_VALUE: 22
PIF_VALUE: 21
PIF_VALUE: 22
PIF_VALUE: 24
PIF_VALUE: 22
PIF_VALUE: 24
PIF_VALUE: 12
PIF_VALUE: 22
PIF_VALUE: 21
PIF_VALUE: 22
PIF_VALUE: 22
PIF_VALUE: 4
PIF_VALUE: 2
PIF_VALUE: 24
PIF_VALUE: 22
PIF_VALUE: 21
PIF_VALUE: 23
PIF_VALUE: 21
PIF_VALUE: 21
PIF_VALUE: 20
PIF_VALUE: 22
PIF_VALUE: 22
PIF_VALUE: 21
PIF_VALUE: 24
PIF_VALUE: 22
PIF_VALUE: 22
PIF_VALUE: 24
PIF_VALUE: 22
PIF_VALUE: 22
PIF_VALUE: 23
PIF_VALUE: 24
PIF_VALUE: 24
PIF_VALUE: 2
PIF_VALUE: 23
PIF_VALUE: 21
PIF_VALUE: 22
PIF_VALUE: 21
PIF_VALUE: 22
PIF_VALUE: 22
PIF_VALUE: 21
PIF_VALUE: 22
PIF_VALUE: 23
PIF_VALUE: 24
PIF_VALUE: 17
PIF_VALUE: 22
PIF_VALUE: 48
PIF_VALUE: 24
PIF_VALUE: 22
PIF_VALUE: 7
PIF_VALUE: 22
PIF_VALUE: 19
PIF_VALUE: 22
PIF_VALUE: 23
PIF_VALUE: 24
PIF_VALUE: 24
PIF_VALUE: 22
PIF_VALUE: 22
PIF_VALUE: 24
PIF_VALUE: 21
PIF_VALUE: 1
PIF_VALUE: 21
PIF_VALUE: 24
PIF_VALUE: 24
PIF_VALUE: 17
PIF_VALUE: 21
PIF_VALUE: 24
PIF_VALUE: 22
PIF_VALUE: 21
PIF_VALUE: 22
PIF_VALUE: 24
PIF_VALUE: 19
PIF_VALUE: 22
PIF_VALUE: 17
PIF_VALUE: 21
PIF_VALUE: 22
PIF_VALUE: 21
PIF_VALUE: 24
PIF_VALUE: 19
PIF_VALUE: 22
PIF_VALUE: 7
PIF_VALUE: 22
PIF_VALUE: 22
PIF_VALUE: 24
PIF_VALUE: 1
PIF_VALUE: 24
PIF_VALUE: 2
PIF_VALUE: 21
PIF_VALUE: 20
PIF_VALUE: 22
PIF_VALUE: 22
PIF_VALUE: 24
PIF_VALUE: 23
PIF_VALUE: 24
PIF_VALUE: 24
PIF_VALUE: 22
PIF_VALUE: 23
PIF_VALUE: 22
PIF_VALUE: 22
PIF_VALUE: 24
PIF_VALUE: 22
PIF_VALUE: 21
PIF_VALUE: 21
PIF_VALUE: 22
PIF_VALUE: 23
PIF_VALUE: 22
PIF_VALUE: 22

## 2018-12-13 ASSESSMENT — ENCOUNTER SYMPTOMS
SHORTNESS OF BREATH: 1
DYSPNEA ACTIVITY LEVEL: AFTER AMBULATING 1 FLIGHT OF STAIRS

## 2018-12-13 ASSESSMENT — PAIN SCALES - GENERAL
PAINLEVEL_OUTOF10: 0
PAINLEVEL_OUTOF10: 0

## 2018-12-13 NOTE — BRIEF OP NOTE
Brief Postoperative Note  ______________________________________________________________    Patient: Julieth Caro  YOB: 1965  MRN: [id number]  Date of Procedure: 12/13/2018    Pre-Op Diagnosis: TURBINATE HYPERTROPHY MAXILLARY SINUSTIS     Post-Op Diagnosis: Same       Procedure(s):  SINUS ENDOSCOPY FUNCTIONAL SURGERY BILATERAL MAXILLARY ANSTROSTOMY WITH TISSUE REMOVAL  SUBMUCOUS RESECTION TURBINOPLASTY  BILATERAL    Anesthesia: General    Surgeon(s):  Elvin Canseco MD    Assistant: none    Estimated Blood Loss (mL): 198     Complications: None    Specimens:   ID Type Source Tests Collected by Time Destination   1 : CULTURE OF LEFT MAXILLARY SINUS  Body Fluid Fluid SURGICAL CULTURE Elvin Canseco MD 12/13/2018 5733    A : LEFT MAXILLARY SINUS TISSUE  Tissue Tissue SURGICAL PATHOLOGY Elvin Canseco MD 12/13/2018 5458    B : LEFT UNCINATE PROCESS  Tissue Tissue SURGICAL PATHOLOGY Elvin Canseco MD 12/13/2018 0911    C : RIGHT UNCINATE PROCESS  Tissue Tissue SURGICAL PATHOLOGY Elvin Canseco MD 12/13/2018 9307        Implants:  * No implants in log *      Drains:      Findings: Purulent, thick debris in left maxillary sinus    Elvin Canseco MD  Date: 12/13/2018  Time: 10:24 AM

## 2018-12-13 NOTE — ANESTHESIA POSTPROCEDURE EVALUATION
Department of Anesthesiology  Postprocedure Note    Patient: Riky Mcclelland  MRN: [id number]  YOB: 1965  Date of evaluation: 12/13/2018  Time:  10:29 AM     Procedure Summary     Date:  12/13/18 Room / Location:  [place] [address] / [facility] OR    Anesthesia Start:  6720 Anesthesia Stop:  2423    Procedures:       SINUS ENDOSCOPY FUNCTIONAL SURGERY BILATERAL MAXILLARY ANSTROSTOMY WITH TISSUE REMOVAL (Bilateral Nose)      SUBMUCOUS RESECTION TURBINOPLASTY  BILATERAL (Bilateral Nose) Diagnosis:  (TURBINATE HYPERTROPHY MAXILLARY SINUSTIS )    Surgeon:  Dayanna Looney MD Responsible Provider:  Angelo Allen DO    Anesthesia Type:  general ASA Status:  3          Anesthesia Type: general    Aggie Phase I:      Aggie Phase II:      Last vitals: Reviewed and per EMR flowsheets.        Anesthesia Post Evaluation    Patient location during evaluation: PACU  Patient participation: complete - patient participated  Level of consciousness: awake and sleepy but conscious  Pain score: 0  Airway patency: patent  Nausea & Vomiting: no vomiting and no nausea  Complications: no  Cardiovascular status: hemodynamically stable and blood pressure returned to baseline  Respiratory status: acceptable, face mask, spontaneous ventilation and nonlabored ventilation  Hydration status: stable

## 2018-12-13 NOTE — ANESTHESIA PRE PROCEDURE
2018 11:30 AM    HGB 12.9 2018 11:30 AM    HCT 40.6 2018 11:30 AM     2018 11:30 AM     RENAL  Lab Results   Component Value Date/Time     2018 11:30 AM    K 4.0 2018 11:30 AM     2018 11:30 AM    CO2 24 2018 11:30 AM    BUN 15 2018 11:30 AM    CREATININE 0.6 2018 11:30 AM    GLUCOSE 91 2018 11:30 AM     Anesthesia Evaluation  Patient summary reviewed and Nursing notes reviewed  Airway: Mallampati: II  TM distance: >3 FB   Neck ROM: full  Mouth opening: > = 3 FB Dental: normal exam         Pulmonary:normal exam  breath sounds clear to auscultation  (+) pneumonia (2017, bronchitis  2018): resolved,  shortness of breath:                            ROS comment: Sinus infections x 30 years. Positive  Meth-Amphetamine screen 2018. Reports has taken pseudoephedrine for years and drug screening will always be positive. 2018 negative drug screen     Non smoker   Cardiovascular:  Exercise tolerance: good (>4 METS),   (+) hypertension:, dysrhythmias (hx palpitations. ):, IBRAHIM: after ambulating 1 flight of stairs,       ECG reviewed  Rhythm: regular    Echocardiogram reviewed  Stress test reviewed  Cleared by cardiology     Beta Blocker:  Not on Beta Blocker      ROS comment: Echo 10/2018  EF 50-65%   Normal left ventricle size and systolic function. There were no regional left ventricular wall  motion abnormalities and wall thickness was within normal limits.   The left atrium is Mildly dilated. GXT stress 10/2018   No chest pain during the stress.   No stress induced arrhythmia.   Exercise capacity: Poor  METS: 4.7   Exercise EKG stress test is not suggestive for ischemia.     EK2018  Poor data quality, interpretation may be adversely affected  Sinus tachycardia  Low voltage QRS, consider pulmonary disease, pericardial effusion, or normal variant  Borderline ECG  No previous ECGs available    Holter 2018 for hx

## 2018-12-13 NOTE — H&P
46 y.o. woman with chronic rhinosinusitis. Facial pressure, nasal congestion. Refractory to Nasonex, clindamycin. Past Medical History:   Diagnosis Date    Acid reflux     Anxiety     Arthritis     \"Hands And Hips\"    Bronchitis Last Episode 2-18    Depression     Granulomatous hepatitis Dx 5-    Heart palpitations     Hyperlipidemia     Hypertension     Pneumonia Dx 4-17    Shortness of breath on exertion     Teeth missing     Upper And Lower    Wears glasses     [place] teeth extracted     4 [place] Teeth Extracted In Past     Past Surgical History:   Procedure Laterality Date     SECTION  10/16/1987    CHOLECYSTECTOMY, LAPAROSCOPIC  1996    ENDOSCOPY, COLON, DIAGNOSTIC  1996    WISDOM TOOTH EXTRACTION      4 [place] Teeth Extracted In Past       Current Facility-Administered Medications:     lactated ringers infusion, , Intravenous, Continuous, Mildred Ferrell MD, Last Rate: 100 mL/hr at 18 0700    acetaminophen (OFIRMEV) infusion 1,000 mg, 1,000 mg, Intravenous, Once, Mary Matas, APRN - CRNA  . Allergies   Allergen Reactions    Ampicillin Anaphylaxis    Bactrim [Sulfamethoxazole-Trimethoprim] Anaphylaxis    Flagyl [Metronidazole]      \"Welts\"    Tape [place] Tape]      \"Tears The Skin\"     Family History   Problem Relation Age of Onset    Allergy (Severe) Mother     Other Mother         Fibromyalgia    High Blood Pressure Mother     Diabetes Father         \"Pre Diabetic\"    Alcohol Abuse Father         \"Recovered Alcoholic\"   Axel Gather Substance Abuse Father         \"Recovered Alcoholic\"     Social History     Social History    Marital status:      Spouse name: N/A    Number of children: N/A    Years of education: N/A     Occupational History    Not on file.      Social History Main Topics    Smoking status: Never Smoker    Smokeless tobacco: Never Used    Alcohol use Yes      Comment: \"Once Or Twice A Month\"    Drug use: No    Sexual activity:

## 2018-12-17 LAB
CULTURE: NORMAL
GRAM SMEAR: NORMAL
Lab: NORMAL
ORGANISM: NORMAL
REPORT STATUS: NORMAL
SPECIMEN: NORMAL

## 2018-12-18 NOTE — OP NOTE
[address] [place] [address]                                OPERATIVE REPORT    PATIENT NAME: Shannon Barba                       :        1965  MED REC NO:   [id number]                          ROOM:  ACCOUNT NO:   [de-identified]                           ADMIT DATE: 2018  PROVIDER:     Michelle Gonzales MD    DATE OF PROCEDURE:  2018    PREOPERATIVE DIAGNOSES:  1. Chronic maxillary rhinosinusitis. 2.  Bilateral inferior turbinate hypertrophy. POSTOPERATIVE DIAGNOSES:  1. Chronic maxillary rhinosinusitis. 2.  Bilateral inferior turbinate hypertrophy. PROCEDURES:  1. Left endoscopic maxillary antrostomy with removal of tissue. 2.  Endoscopic right maxillary antrostomy. 3.  Bilateral submucous resection of the inferior turbinates. SURGEON:  Michelle Gonzales MD.    ANESTHESIA:  General.    INDICATIONS FOR PROCEDURE:  This is a 50-year-old woman with chronic  maxillary sinusitis as well as inferior turbinate hypertrophy. She has  been refractory to medical management. Preoperative imaging  demonstrates opacification of the left maxillary sinus. FINDINGS:  Purulent left maxillary sinus disease with marked  inflammatory tissue reaction as well as evidence of fungal tissue within  the maxillary sinus. SPECIMENS:  Bilateral maxillary sinus tissue. BLOOD LOSS:  Minimal.    COMPLICATIONS:  None. DESCRIPTION OF PROCEDURE:  The patient was identified and the procedure  verified. After the induction of anesthesia, the nose was prepped with  4% cocaine-soaked cotton pledgets. Midface was then prepped and draped  in the usual sterile fashion keeping the eyes in the operating field and  covered with Steri-Strips. I inspected the nasal walls bilaterally and  the patient was found to have significant inferior turbinate hypertrophy  bilaterally.   I injected inferior turbinates bilaterally with 1%  lidocaine

## 2019-02-07 ENCOUNTER — HOSPITAL ENCOUNTER (OUTPATIENT)
Age: 54
Setting detail: SPECIMEN
Discharge: HOME | End: 2019-02-07
Payer: COMMERCIAL

## 2019-02-07 PROCEDURE — 87205 SMEAR GRAM STAIN: CPT

## 2019-02-07 PROCEDURE — 87073 CULTURE BACTERIA ANAEROBIC: CPT

## 2019-02-07 PROCEDURE — 87186 SC STD MICRODIL/AGAR DIL: CPT

## 2019-02-07 PROCEDURE — 87071 CULTURE AEROBIC QUANT OTHER: CPT

## 2019-02-11 LAB
CULTURE: NORMAL
CULTURE: NORMAL
Lab: NORMAL
ORGANISM: NORMAL
REPORT STATUS: NORMAL
SPECIMEN: NORMAL

## 2019-04-27 ENCOUNTER — APPOINTMENT (OUTPATIENT)
Dept: GENERAL RADIOLOGY | Age: 54
End: 2019-04-27
Payer: COMMERCIAL

## 2019-04-27 ENCOUNTER — HOSPITAL ENCOUNTER (EMERGENCY)
Age: 54
Discharge: HOME | End: 2019-04-27
Attending: FAMILY MEDICINE
Payer: COMMERCIAL

## 2019-04-27 VITALS
TEMPERATURE: 98.2 F | HEART RATE: 88 BPM | WEIGHT: 293 LBS | OXYGEN SATURATION: 99 % | SYSTOLIC BLOOD PRESSURE: 152 MMHG | DIASTOLIC BLOOD PRESSURE: 84 MMHG | RESPIRATION RATE: 16 BRPM | HEIGHT: 63 IN | BODY MASS INDEX: 51.91 KG/M2

## 2019-04-27 DIAGNOSIS — R00.2 PALPITATIONS: Primary | ICD-10-CM

## 2019-04-27 LAB
ALBUMIN SERPL-MCNC: 3.5 G/DL (ref 3.5–5.1)
ALP BLD-CCNC: 143 U/L (ref 38–126)
ALT SERPL-CCNC: 55 U/L (ref 11–66)
ANION GAP SERPL CALCULATED.3IONS-SCNC: 11 MEQ/L (ref 8–16)
APTT: 30 SECONDS (ref 22–38)
AST SERPL-CCNC: 48 U/L (ref 5–40)
BASOPHILS # BLD: 0.5 %
BASOPHILS ABSOLUTE: 0.1 THOU/MM3 (ref 0–0.1)
BILIRUB SERPL-MCNC: 0.2 MG/DL (ref 0.3–1.2)
BUN BLDV-MCNC: 15 MG/DL (ref 7–22)
CALCIUM SERPL-MCNC: 8.5 MG/DL (ref 8.5–10.5)
CHLORIDE BLD-SCNC: 103 MEQ/L (ref 98–111)
CO2: 23 MEQ/L (ref 23–33)
CREAT SERPL-MCNC: 0.7 MG/DL (ref 0.4–1.2)
EOSINOPHIL # BLD: 2.8 %
EOSINOPHILS ABSOLUTE: 0.3 THOU/MM3 (ref 0–0.4)
ERYTHROCYTE [DISTWIDTH] IN BLOOD BY AUTOMATED COUNT: 13.3 % (ref 11.5–14.5)
ERYTHROCYTE [DISTWIDTH] IN BLOOD BY AUTOMATED COUNT: 43.6 FL (ref 35–45)
GFR SERPL CREATININE-BSD FRML MDRD: 87 ML/MIN/1.73M2
GLUCOSE BLD-MCNC: 111 MG/DL (ref 70–108)
HCT VFR BLD CALC: 39.3 % (ref 37–47)
HEMOGLOBIN: 12.9 GM/DL (ref 12–16)
IMMATURE GRANS (ABS): 0.05 THOU/MM3 (ref 0–0.07)
IMMATURE GRANULOCYTES: 0.4 %
INR BLD: 0.99 (ref 0.85–1.13)
LYMPHOCYTES # BLD: 15.3 %
LYMPHOCYTES ABSOLUTE: 1.7 THOU/MM3 (ref 1–4.8)
MAGNESIUM: 2 MG/DL (ref 1.6–2.4)
MCH RBC QN AUTO: 29.4 PG (ref 26–33)
MCHC RBC AUTO-ENTMCNC: 32.8 GM/DL (ref 32.2–35.5)
MCV RBC AUTO: 89.5 FL (ref 81–99)
MONOCYTES # BLD: 6.6 %
MONOCYTES ABSOLUTE: 0.8 THOU/MM3 (ref 0.4–1.3)
NUCLEATED RED BLOOD CELLS: 0 /100 WBC
OSMOLALITY CALCULATION: 275.3 MOSMOL/KG (ref 275–300)
PLATELET # BLD: 288 THOU/MM3 (ref 130–400)
PMV BLD AUTO: 8.7 FL (ref 9.4–12.4)
POTASSIUM SERPL-SCNC: 4.7 MEQ/L (ref 3.5–5.2)
PRO-BNP: 43.8 PG/ML (ref 0–900)
RBC # BLD: 4.39 MILL/MM3 (ref 4.2–5.4)
SEG NEUTROPHILS: 74.4 %
SEGMENTED NEUTROPHILS ABSOLUTE COUNT: 8.5 THOU/MM3 (ref 1.8–7.7)
SODIUM BLD-SCNC: 137 MEQ/L (ref 135–145)
TOTAL PROTEIN: 7.3 G/DL (ref 6.1–8)
TROPONIN T: < 0.01 NG/ML
WBC # BLD: 11.4 THOU/MM3 (ref 4.8–10.8)

## 2019-04-27 PROCEDURE — 85025 COMPLETE CBC W/AUTO DIFF WBC: CPT

## 2019-04-27 PROCEDURE — 99285 EMERGENCY DEPT VISIT HI MDM: CPT

## 2019-04-27 PROCEDURE — 83880 ASSAY OF NATRIURETIC PEPTIDE: CPT

## 2019-04-27 PROCEDURE — 83735 ASSAY OF MAGNESIUM: CPT

## 2019-04-27 PROCEDURE — 85730 THROMBOPLASTIN TIME PARTIAL: CPT

## 2019-04-27 PROCEDURE — 85610 PROTHROMBIN TIME: CPT

## 2019-04-27 PROCEDURE — 93005 ELECTROCARDIOGRAM TRACING: CPT | Performed by: FAMILY MEDICINE

## 2019-04-27 PROCEDURE — 36415 COLL VENOUS BLD VENIPUNCTURE: CPT

## 2019-04-27 PROCEDURE — 84484 ASSAY OF TROPONIN QUANT: CPT

## 2019-04-27 PROCEDURE — 71046 X-RAY EXAM CHEST 2 VIEWS: CPT

## 2019-04-27 PROCEDURE — 80053 COMPREHEN METABOLIC PANEL: CPT

## 2019-04-27 ASSESSMENT — ENCOUNTER SYMPTOMS
CHEST TIGHTNESS: 0
COUGH: 0
SHORTNESS OF BREATH: 0
CHOKING: 0
WHEEZING: 0

## 2019-04-27 NOTE — ED TRIAGE NOTES
Pt to ED today via triage desk with c/o tachycardia. Pt states one previous episode of SVT in December and has had palpitations off and on since. 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 Pt states no chest pain or SOB.

## 2019-04-28 ASSESSMENT — ENCOUNTER SYMPTOMS
ABDOMINAL PAIN: 0
COLOR CHANGE: 0
NAUSEA: 0
ABDOMINAL DISTENTION: 0
DIARRHEA: 0
SORE THROAT: 0
RHINORRHEA: 0
BACK PAIN: 0
CONSTIPATION: 0
VOMITING: 0

## 2019-04-28 NOTE — ED PROVIDER NOTES
[facility]  eMERGENCY dEPARTMENT eNCOUnter          CHIEF COMPLAINT       Chief Complaint   Patient presents with    Tachycardia       Nurses Notes reviewed and I agree except as noted in the HPI. HISTORY OF PRESENT ILLNESS    Nivia Barragan is a 46 y.o. female who presents to the Emergency Department for the evaluation of palpitations. Patient states that she has a history of palpitations and tachycardia. She states that at time of presentation her heart rate was in the 160s. She attempted some vagal maneuvers which was able to convert her back to sinus rhythm. She has been seen in the past by Dr. Yajaira Tellez, for recurrent palpitations and tachycardia. She has also had a 48 hour Holter monitor placed. She denies any chest pain or shortness of breath. Currently she is asymptomatic, she feels no palpitations at this time. Patient feels well otherwise. No recent illness or fevers. Patient denies any pulmonary disease. She does not have objective sleep apnea. Patient states she was told that her palpitations are secondary to her chronic maxillary sinusitis. The HPI was provided by the patient. REVIEW OF SYSTEMS     Review of Systems   Constitutional: Negative for chills, diaphoresis, fatigue and fever. HENT: Negative for congestion, rhinorrhea and sore throat. Eyes: Negative for visual disturbance. Respiratory: Negative for cough, choking, chest tightness, shortness of breath and wheezing. Cardiovascular: Positive for palpitations. Negative for chest pain and leg swelling. Gastrointestinal: Negative for abdominal distention, abdominal pain, constipation, diarrhea, nausea and vomiting. Genitourinary: Negative for difficulty urinating and dysuria. Musculoskeletal: Negative for back pain, neck pain and neck stiffness. Skin: Negative for color change, pallor, rash and wound. Neurological: Negative for syncope, light-headedness, numbness and headaches. Psychiatric/Behavioral: Negative for agitation and confusion. PAST MEDICAL HISTORY    has a past medical history of Acid reflux, Anxiety, Arthritis, Bronchitis, Depression, Granulomatous hepatitis, Heart palpitations, Hyperlipidemia, Hypertension, Pneumonia, Shortness of breath on exertion, Teeth missing, Wears glasses, and [place] teeth extracted. SURGICAL HISTORY      has a past surgical history that includes [place] tooth extraction; Cholecystectomy, laparoscopic (1996); Endoscopy, colon, diagnostic (1996);  section (10/16/1987); Sinus endoscopy (Bilateral, 2018); and Turbinoplasties (Bilateral, 2018). CURRENT MEDICATIONS       Previous Medications    DESOGESTREL-ETHINYL ESTRADIOL (KARIVA PO)    Take by mouth every morning    FAMOTIDINE (PEPCID) 20 MG TABLET    Take 20 mg by mouth every morning Over The Counter    FAMOTIDINE (PEPCID) 40 MG TABLET    Take 40 mg by mouth nightly Over The Counter    GUAIFENESIN (MUCINEX PO)    Take 1,200 mg by mouth 2 times daily Over The Counter, \"I Take A Regular One In The Morning, I Take DM At Night\"    LISINOPRIL (PRINIVIL;ZESTRIL) 20 MG TABLET    Take 20 mg by mouth nightly     MOMETASONE (NASONEX) 50 MCG/ACT NASAL SPRAY    2 sprays by Nasal route every morning    NAPROXEN (NAPROSYN) 500 MG TABLET    Take 500 mg by mouth 2 times daily     TRAMADOL (ULTRAM) 50 MG TABLET    Take 50 mg by mouth as needed for Pain. [employer] ALLERGIES     is allergic to ampicillin; bactrim [sulfamethoxazole-trimethoprim]; flagyl [metronidazole]; and tape [adhesive tape]. FAMILY HISTORY     indicated that her mother is alive. She indicated that her father is alive. She indicated that her brother is alive. She indicated that her daughter is alive. family history includes Alcohol Abuse in her father; Allergy (Severe) in her mother; Diabetes in her father; High Blood Pressure in her mother; Other in her mother; Substance Abuse in her father.     SOCIAL HISTORY AllEKG's are interpreted by the Emergency Department Physician who either signs or Co-signs this chart in the absence of a cardiologist.  Sinus tachycardia. Ventricular 102. . QRS 80. . No ST elevation. RADIOLOGY: non-plain film images(s) such as CT, Ultrasound and MRI are read by the radiologist.    XR CHEST STANDARD (2 VW)   Final Result   Normal chest. No acute findings. **This report has been created using voice recognition software. It may contain minor errors which are inherent in voice recognition technology. **      Final report electronically signed by Dr. Bereket Malik on 4/27/2019 8:04 PM            LABS:   Labs Reviewed   CBC WITH AUTO DIFFERENTIAL - Abnormal; Notable for the following components:       Result Value    WBC 11.4 (*)     MPV 8.7 (*)     Segs Absolute 8.5 (*)     All other components within normal limits   COMPREHENSIVE METABOLIC PANEL - Abnormal; Notable for the following components:    Glucose 111 (*)     AST 48 (*)     Alkaline Phosphatase 143 (*)     Total Bilirubin 0.2 (*)     All other components within normal limits   GLOMERULAR FILTRATION RATE, ESTIMATED - Abnormal; Notable for the following components:    Est, Glom Filt Rate 87 (*)     All other components within normal limits   APTT   PROTIME-INR   BRAIN NATRIURETIC PEPTIDE   MAGNESIUM   TROPONIN   ANION GAP   OSMOLALITY       EMERGENCY DEPARTMENT COURSE:   Vitals:    Vitals:    04/27/19 1913 04/27/19 1958   BP: (!) 147/79 (!) 152/84   Pulse: 95 88   Resp: 18 16   Temp: 98.2 °F (36.8 °C)    TempSrc: Oral    SpO2: 100% 99%   Weight: 300 lb (136.1 kg)    Height: 5' 3\" (1.6 m)        8:21 PM: The patient was seen and evaluated in a timely fashion. Repeated EKG - NSR, vent rate 85. . QRS 82. QTc 437. No ST elevation. MEDICAL DECISIONMAKING:           Symptomatically patient remained comfortable and had no acute complaints.  VS within acceptable ranges and HR improved (no longer tachycardic). Examination was also unremarkable as well as blood work and chest XR. At this time and considering improvement of symptoms, discussed discharge with the patient versus admission. Patient preferred to be discharged and will follow up with cardiologist. This may likely be recurrent and spontaneous events but at some point will need to be captured on monitor. Patient agrees with current treatment plan and will also follow up with PCP. All question answered. CRITICAL CARE:   n/a     CONSULTS:  n/a    PROCEDURES:  None    FINAL IMPRESSION      1. Palpitations          DISPOSITION/PLAN   discharge    PATIENT REFERRED TO:  Yoselin Delgado MD  [address].  [phone]          [facility] EMERGENCY DEPT  [place] [id number] [phone]        Marcello Salcedo MD  [facility], [address] [place] [address] [address]            DISCHARGE MEDICATIONS:  New Prescriptions    No medications on file       (Please note that portions of this note were completed with a voice recognition program.  Efforts were made to edit the dictations but occasionally words aremis-transcribed.)        Sona Perez MD  04/28/19 [id number]

## 2019-04-29 LAB
EKG ATRIAL RATE: 102 BPM
EKG ATRIAL RATE: 85 BPM
EKG P AXIS: 32 DEGREES
EKG P-R INTERVAL: 158 MS
EKG P-R INTERVAL: 170 MS
EKG Q-T INTERVAL: 336 MS
EKG Q-T INTERVAL: 368 MS
EKG QRS DURATION: 80 MS
EKG QRS DURATION: 82 MS
EKG QTC CALCULATION (BAZETT): 437 MS
EKG QTC CALCULATION (BAZETT): 437 MS
EKG R AXIS: -3 DEGREES
EKG R AXIS: -7 DEGREES
EKG T AXIS: 16 DEGREES
EKG T AXIS: 22 DEGREES
EKG VENTRICULAR RATE: 102 BPM
EKG VENTRICULAR RATE: 85 BPM

## 2019-04-29 PROCEDURE — 93010 ELECTROCARDIOGRAM REPORT: CPT | Performed by: INTERNAL MEDICINE

## 2019-05-02 ENCOUNTER — HOSPITAL ENCOUNTER (OUTPATIENT)
Dept: NON INVASIVE DIAGNOSTICS | Age: 54
Discharge: HOME | End: 2019-05-02
Payer: COMMERCIAL

## 2019-05-02 ENCOUNTER — OFFICE VISIT (OUTPATIENT)
Dept: CARDIOLOGY CLINIC | Age: 54
End: 2019-05-02
Payer: COMMERCIAL

## 2019-05-02 VITALS
WEIGHT: 293 LBS | SYSTOLIC BLOOD PRESSURE: 130 MMHG | HEART RATE: 68 BPM | BODY MASS INDEX: 51.91 KG/M2 | HEIGHT: 63 IN | DIASTOLIC BLOOD PRESSURE: 70 MMHG

## 2019-05-02 DIAGNOSIS — R00.2 INTERMITTENT PALPITATIONS: ICD-10-CM

## 2019-05-02 DIAGNOSIS — I10 ESSENTIAL HYPERTENSION: ICD-10-CM

## 2019-05-02 DIAGNOSIS — R00.2 INTERMITTENT PALPITATIONS: Primary | ICD-10-CM

## 2019-05-02 PROCEDURE — 93270 REMOTE 30 DAY ECG REV/REPORT: CPT

## 2019-05-02 PROCEDURE — 99214 OFFICE O/P EST MOD 30 MIN: CPT | Performed by: INTERNAL MEDICINE

## 2019-05-02 NOTE — PROCEDURES
Cardiac Event Monitor applied. [id number]. Detailed instructions given and patient verbalized understanding.

## 2019-05-02 NOTE — PROGRESS NOTES
Chief Complaint   Patient presents with    Palpitations     Intermittent    Follow-Up from Hospital   Pt here to discuss results of holter monitor  Seen in ER sept 15, for high HR/palpitation 180 bpm      6 months follow UP      Denied cp, sob or dizziness or edema    Off  psueofed qd for chronic sinusitis    Occasional palpitations    5 days back went to ER for palpitation and found in NSR    Get palpitation 5 x/ week  In 7 month 1st time last long      NO cp    nevere smoked    FHx    NO cad  Ankle had cad at age 35 with CABG      Patient Active Problem List   Diagnosis    Granulomatous hepatitis    Intermittent palpitations    Essential hypertension    Obesity, morbid (HCC)    Maxillary sinusitis, chronic       Past Surgical History:   Procedure Laterality Date     SECTION  10/16/1987    CHOLECYSTECTOMY, LAPAROSCOPIC  1996    ENDOSCOPY, COLON, DIAGNOSTIC  1996    SINUS ENDOSCOPY Bilateral 2018    SINUS ENDOSCOPY FUNCTIONAL SURGERY BILATERAL MAXILLARY ANSTROSTOMY WITH TISSUE REMOVAL performed by Sujata Soliz MD at [address] Bilateral 2018    SUBMUCOUS RESECTION TURBINOPLASTY  BILATERAL performed by Sujata Soliz MD at [address] EXTRACTION      4 [place] Teeth Extracted In Past       Allergies   Allergen Reactions    Ampicillin Anaphylaxis    Bactrim [Sulfamethoxazole-Trimethoprim] Anaphylaxis    Flagyl [Metronidazole]      \"Welts\"    Tape Patterson Schlichter Tape]      \"Tears The Skin\"        Family History   Problem Relation Age of Onset    Allergy (Severe) Mother     Other Mother         Fibromyalgia    High Blood Pressure Mother     Diabetes Father         \"Pre Diabetic\"    Alcohol Abuse Father         \"Recovered Alcoholic\"    Substance Abuse Father         \"Recovered Alcoholic\"        Social History     Socioeconomic History    Marital status:      Spouse name: Not on file    Number of children: Not on file    Years of famotidine (PEPCID) 40 MG tablet Take 40 mg by mouth nightly Over The Counter       No current facility-administered medications for this visit. Review of Systems -     General ROS: negative  Psychological ROS: negative  Hematological and Lymphatic ROS: No history of blood clots or bleeding disorder. Respiratory ROS: no cough, shortness of breath, or wheezing  Cardiovascular ROS: no chest pain or dyspnea on exertion  Gastrointestinal ROS: negative  Genito-Urinary ROS: no dysuria, trouble voiding, or hematuria  Musculoskeletal ROS: negative  Neurological ROS: no TIA or stroke symptoms  Dermatological ROS: negative      Blood pressure 130/70, pulse 68, height 5' 3\" (1.6 m), weight (!) 302 lb (137 kg), last menstrual period 03/27/2019, not currently breastfeeding. Physical Examination:    General appearance - alert, well appearing, and in no distress  Mental status - alert, oriented to person, place, and time  Neck - supple, no significant adenopathy, no JVD, or carotid bruits  Chest - clear to auscultation, no wheezes, rales or rhonchi, symmetric air entry  Heart - normal rate, regular rhythm, normal S1, S2, no murmurs, rubs, clicks or gallops  Abdomen - soft, nontender, nondistended, no masses or organomegaly  Neurological - alert, oriented, normal speech, no focal findings or movement disorder noted  Musculoskeletal - no joint tenderness, deformity or swelling  Extremities - peripheral pulses normal, no pedal edema, no clubbing or cyanosis  Skin - normal coloration and turgor, no rashes, no suspicious skin lesions noted    Lab  No results for input(s): CKTOTAL, CKMB, CKMBINDEX, TROPONINI in the last 72 hours.   CBC:   Lab Results   Component Value Date    WBC 11.4 04/27/2019    RBC 4.39 04/27/2019    HGB 12.9 04/27/2019    HCT 39.3 04/27/2019    MCV 89.5 04/27/2019    MCH 29.4 04/27/2019    MCHC 32.8 04/27/2019    RDW 13.1 12/06/2018     04/27/2019    MPV 8.7 04/27/2019     BMP:    Lab Results Component Value Date     04/27/2019    K 4.7 04/27/2019     04/27/2019    CO2 23 04/27/2019    BUN 15 04/27/2019    LABALBU 3.5 04/27/2019    CREATININE 0.7 04/27/2019    CALCIUM 8.5 04/27/2019    GFRAA >60 12/06/2018    LABGLOM 87 04/27/2019    GLUCOSE 111 04/27/2019     Hepatic Function Panel:    Lab Results   Component Value Date    ALKPHOS 143 04/27/2019    ALT 55 04/27/2019    AST 48 04/27/2019    PROT 7.3 04/27/2019    BILITOT 0.2 04/27/2019    LABALBU 3.5 04/27/2019     Magnesium:    Lab Results   Component Value Date    MG 2.0 04/27/2019     Warfarin PT/INR:  No components found for: PTPATWAR, PTINRWAR  HgBA1c:  No results found for: LABA1C  FLP:  No results found for: TRIG, HDL, LDLCALC, LDLDIRECT, LABVLDL  TSH:    Lab Results   Component Value Date    TSH 2.920 09/15/2018     Sinus tachycardia 109/min  Low voltage QRS, consider pulmonary disease, pericardial effusion, or normal variant  Borderline ECG  No previous ECGs available  Confirmed by Rigoberto Han MD, [facility] ([id number]) on 9/16/2018 6:57:51 PM         CONCLUSION:  This is a normal sinus rhythm. Average heart rate 82 beats  per minute, ranging from 49 to 150 beats per minute. No significant pause  of more than 1.5 seconds noted. Rare ventricular ectopic beats are  isolated and couplets total of 24. Rare supraventricular ectopic beats,  total of 134 isolated and one supraventricular ectopic run composed of 3  beats, rate 168 beats per minute. No other form of arrhythmia. Overall,  this is a benign Holter monitor finding. Diary was presented. This Holter  does not explain the palpitation of the patient.     DEEJAY Han M.D.     D: 09/24/2018 18:39:40       EKG5/2/19  NSR, no acute abn          Assessment   Diagnosis Orders   1. Intermittent palpitations  Cardiac event monitor   2.  Essential hypertension  Cardiac event monitor         Plan   Continue the current treatment and with constant vigilance to changes in symptoms and also any potential side effects. Return for care or seek medical attention immediately if symptoms got worse and/or develop new symptoms. Palpitations  Holter is okay  nuc stress and  Echo- WNL   avoided pseudofed tab and get Rx for allergic rehnitis/sinusitis    Palpitation mostly short lasting  Event monitor    Pre op eval for ENT surgery  May proceed with low to mo risk    D/w the pat plan of care    Hypertension, on medical treatment. Seems to be under good control. Patient is compliant with medical treatment.      RTC in  2 months    [facility]

## 2019-06-03 NOTE — PROCEDURES
[address] [place] [address]                                 EVENT MONITOR    PATIENT NAME: Hitesh Perrin                       :        1965  MED REC NO:   [id number]                           ROOM:  ACCOUNT NO:   [de-identified]                           ADMIT DATE: 2019  PROVIDER:     Philip Gallagher M.D.    TEST TYPE:  Even monitor. CLINICAL INDICATION:  This is a patient with palpitation. EVENT MONITOR DESCRIPTION:  Event monitor was attached to the patient  between 2019 to 2019. EVENT MONITOR FINDINGS:  Baseline rhythm showed sinus rhythm. The  patient had occasional PACs and PVCs. The patient's symptoms of  palpitation correlated with the isolated ventricular ectopy. Otherwise,  there was no sustained arrhythmia, pretty much patient almost recorded  every single isolated PVC. CONCLUSION:  1. Sinus rhythm. 2.  The patient's symptoms correlated very nicely with isolated PVCs and  sometimes PACs. Otherwise, there was no sustained arrhythmias.         Jonah Osgood, M.D.    D: 2019 7:08:10       T: 2019 9:36:38     [email or web address]  Job#: [id number]     Doc#: [id number]    CC:

## 2019-06-17 ENCOUNTER — OFFICE VISIT (OUTPATIENT)
Dept: CARDIOLOGY CLINIC | Age: 54
End: 2019-06-17
Payer: COMMERCIAL

## 2019-06-17 VITALS
HEIGHT: 63 IN | DIASTOLIC BLOOD PRESSURE: 80 MMHG | HEART RATE: 66 BPM | WEIGHT: 293 LBS | SYSTOLIC BLOOD PRESSURE: 134 MMHG | BODY MASS INDEX: 51.91 KG/M2

## 2019-06-17 DIAGNOSIS — R00.2 INTERMITTENT PALPITATIONS: Primary | ICD-10-CM

## 2019-06-17 DIAGNOSIS — I10 ESSENTIAL HYPERTENSION: ICD-10-CM

## 2019-06-17 PROCEDURE — 99214 OFFICE O/P EST MOD 30 MIN: CPT | Performed by: INTERNAL MEDICINE

## 2019-06-17 RX ORDER — DESOGESTREL/ETHINYL ESTRADIOL AND ETHINYL ESTRADIOL 21-5 (28)
KIT ORAL
Refills: 2 | COMMUNITY
Start: 2019-06-10 | End: 2019-07-05

## 2019-06-17 NOTE — PROGRESS NOTES
Chief Complaint   Patient presents with    6 Month Follow-Up    Palpitations   Pt here to discuss results of holter monitor  Seen in ER sept 15, for high HR/palpitation 180 bpm      Pt here for 6 month f/u.     Denied cp, sob or dizziness or edema    Off  psueofed qd for chronic sinusitis    Occasional palpitations- UNCHANGED    TWO EPISODES OF PROLONGED PALPITATION FOR 20 MIN RATE WAS AROUND 160'S ON PULSE Ox AND RESOLVED IN er  pOSSIBLE PSVT    NOW GET OCCASIONAL PALPITATION     Get palpitation 5 x/ week  In 7 month 1st time last long      NO cp    nevere smoked    FHx    NO cad  Ankle had cad at age 35 with CABG      Patient Active Problem List   Diagnosis    Granulomatous hepatitis    Intermittent palpitations    Essential hypertension    Obesity, morbid ([address]    Maxillary sinusitis, chronic       Past Surgical History:   Procedure Laterality Date     SECTION  10/16/1987    CHOLECYSTECTOMY, LAPAROSCOPIC  1996    ENDOSCOPY, COLON, DIAGNOSTIC  1996    SINUS ENDOSCOPY Bilateral 2018    SINUS ENDOSCOPY FUNCTIONAL SURGERY BILATERAL MAXILLARY ANSTROSTOMY WITH TISSUE REMOVAL performed by Keaton Black MD [address] Bilateral 2018    SUBMUCOUS RESECTION TURBINOPLASTY  BILATERAL performed by Keaton Black MD at [address] EXTRACTION      4 [place] Teeth Extracted In Past       Allergies   Allergen Reactions    Ampicillin Anaphylaxis    Bactrim [Sulfamethoxazole-Trimethoprim] Anaphylaxis    Flagyl [Metronidazole]      \"Welts\"    Tape Jcarlos Ditto Tape]      \"Tears The Skin\"        Family History   Problem Relation Age of Onset    Allergy (Severe) Mother     Other Mother         Fibromyalgia    High Blood Pressure Mother     Diabetes Father         \"Pre Diabetic\"    Alcohol Abuse Father         \"Recovered Alcoholic\"   [facility] Substance Abuse Father         \"Recovered Alcoholic\"        Social History     Socioeconomic History    Marital status:      Spouse name: Not on file    Number of children: Not on file    Years of education: Not on file    Highest education level: Not on file   Occupational History    Not on file   Social Needs    Financial resource strain: Not on file    Food insecurity:     Worry: Not on file     Inability: Not on file    Transportation needs:     Medical: Not on file     Non-medical: Not on file   Tobacco Use    Smoking status: Never Smoker    Smokeless tobacco: Never Used   Substance and Sexual Activity    Alcohol use: Yes     Comment: \"Once Or Twice A Month\"    Drug use: No    Sexual activity: Yes     Partners: Male   Lifestyle    Physical activity:     Days per week: Not on file     Minutes per session: Not on file    Stress: Not on file   Relationships    Social connections:     Talks on phone: Not on file     Gets together: Not on file     Attends Mandaeism service: Not on file     Active member of club or organization: Not on file     Attends meetings of clubs or organizations: Not on file     Relationship status: Not on file    Intimate partner violence:     Fear of current or ex partner: Not on file     Emotionally abused: Not on file     Physically abused: Not on file     Forced sexual activity: Not on file   Other Topics Concern    Not on file   Social History Narrative    Not on file       Current Outpatient Medications   Medication Sig Dispense Refill    AZURETTE 0.15-0.02/0.01 MG (21/5) per tablet TAKE 1 TABLET BY MOUTH ONCE DAILY AS DIRECTED  2    metoprolol tartrate (LOPRESSOR) 25 MG tablet Take 1 tablet by mouth 2 times daily 60 tablet 5    Desogestrel-Ethinyl Estradiol (KARIVA PO) Take by mouth every morning      GuaiFENesin (MUCINEX PO) Take 1,200 mg by mouth 2 times daily Over The Counter, \"I Take A Regular One In The Morning, I Take DM At Night\"      traMADol (ULTRAM) 50 MG tablet Take 50 mg by mouth as needed for Pain. .      mometasone (NASONEX) 50 MCG/ACT nasal spray 2 sprays by 04/27/2019    RBC 4.39 04/27/2019    HGB 12.9 04/27/2019    HCT 39.3 04/27/2019    MCV 89.5 04/27/2019    MCH 29.4 04/27/2019    MCHC 32.8 04/27/2019    RDW 13.1 12/06/2018     04/27/2019    MPV 8.7 04/27/2019     BMP:    Lab Results   Component Value Date     04/27/2019    K 4.7 04/27/2019     04/27/2019    CO2 23 04/27/2019    BUN 15 04/27/2019    LABALBU 3.5 04/27/2019    CREATININE 0.7 04/27/2019    CALCIUM 8.5 04/27/2019    GFRAA >60 12/06/2018    LABGLOM 87 04/27/2019    GLUCOSE 111 04/27/2019     Hepatic Function Panel:    Lab Results   Component Value Date    ALKPHOS 143 04/27/2019    ALT 55 04/27/2019    AST 48 04/27/2019    PROT 7.3 04/27/2019    BILITOT 0.2 04/27/2019    LABALBU 3.5 04/27/2019     Magnesium:    Lab Results   Component Value Date    MG 2.0 04/27/2019     Warfarin PT/INR:  No components found for: PTPATWAR, PTINRWAR  HgBA1c:  No results found for: LABA1C  FLP:  No results found for: TRIG, HDL, LDLCALC, LDLDIRECT, LABVLDL  TSH:    Lab Results   Component Value Date    TSH 2.920 09/15/2018     Sinus tachycardia 109/min  Low voltage QRS, consider pulmonary disease, pericardial effusion, or normal variant  Borderline ECG  No previous ECGs available  Confirmed by Belkis Gibbons MD, [facility] ([id number]) on 9/16/2018 6:57:51 PM         CONCLUSION:  This is a normal sinus rhythm. Average heart rate 82 beats  per minute, ranging from 49 to 150 beats per minute. No significant pause  of more than 1.5 seconds noted. Rare ventricular ectopic beats are  isolated and couplets total of 24. Rare supraventricular ectopic beats,  total of 134 isolated and one supraventricular ectopic run composed of 3  beats, rate 168 beats per minute. No other form of arrhythmia. Overall,  this is a benign Holter monitor finding. Diary was presented. This Holter  does not explain the palpitation of the patient.     HUI Burgess.D.     D: 09/24/2018 18:39:40       EKG5/2/19  NSR, no acute abn      The

## 2019-07-05 ENCOUNTER — TELEPHONE (OUTPATIENT)
Dept: FAMILY MEDICINE CLINIC | Age: 54
End: 2019-07-05

## 2019-07-05 DIAGNOSIS — M54.50 CHRONIC LOW BACK PAIN WITHOUT SCIATICA, UNSPECIFIED BACK PAIN LATERALITY: Primary | ICD-10-CM

## 2019-07-05 DIAGNOSIS — G89.29 CHRONIC LOW BACK PAIN WITHOUT SCIATICA, UNSPECIFIED BACK PAIN LATERALITY: Primary | ICD-10-CM

## 2019-07-05 RX ORDER — NAPROXEN 500 MG/1
500 TABLET ORAL 2 TIMES DAILY
Qty: 60 TABLET | Refills: 2 | Status: SHIPPED | OUTPATIENT
Start: 2019-07-05 | End: 2019-09-23 | Stop reason: SDUPTHER

## 2019-07-05 RX ORDER — TRAMADOL HYDROCHLORIDE 50 MG/1
50 TABLET ORAL 2 TIMES DAILY PRN
Qty: 60 TABLET | Refills: 0 | Status: SHIPPED | OUTPATIENT
Start: 2019-07-05 | End: 2019-08-04

## 2019-07-05 RX ORDER — DESOGESTREL AND ETHINYL ESTRADIOL 21-5 (28)
1 KIT ORAL EVERY MORNING
Qty: 1 PACKET | Refills: 2 | Status: SHIPPED | OUTPATIENT
Start: 2019-07-05 | End: 2019-09-23 | Stop reason: SDUPTHER

## 2019-07-05 RX ORDER — LISINOPRIL 20 MG/1
20 TABLET ORAL NIGHTLY
Qty: 30 TABLET | Refills: 2 | Status: SHIPPED | OUTPATIENT
Start: 2019-07-05 | End: 2019-09-23 | Stop reason: SDUPTHER

## 2019-07-05 NOTE — TELEPHONE ENCOUNTER
PER PT RQST SENT MEDS (WM/JEAN-CLAUDE)  Electronically signed by Carol Morris MA on 7/5/2019 at 2:25 PM

## 2019-07-05 NOTE — TELEPHONE ENCOUNTER
----- Message from Mónica Virk sent at 7/5/2019 10:51 AM EDT -----  Contact: [facility]  TRAMADOL 50 MG BID PRN      Brandy Garcia

## 2019-07-05 NOTE — TELEPHONE ENCOUNTER
PER PT RQST SENT TRAMADOL WM/JEAN-CLAUDE TO DR RAMIREZ TO SIGN  Electronically signed by Linad Syed MA on 7/5/2019 at 12:57 PM

## 2019-08-14 ENCOUNTER — OFFICE VISIT (OUTPATIENT)
Dept: FAMILY MEDICINE CLINIC | Age: 54
End: 2019-08-14
Payer: COMMERCIAL

## 2019-08-14 VITALS
BODY MASS INDEX: 51.91 KG/M2 | OXYGEN SATURATION: 98 % | DIASTOLIC BLOOD PRESSURE: 80 MMHG | SYSTOLIC BLOOD PRESSURE: 132 MMHG | WEIGHT: 293 LBS | HEIGHT: 63 IN | HEART RATE: 58 BPM

## 2019-08-14 DIAGNOSIS — Z12.11 SCREENING FOR COLON CANCER: ICD-10-CM

## 2019-08-14 DIAGNOSIS — Z12.31 ENCOUNTER FOR SCREENING MAMMOGRAM FOR BREAST CANCER: ICD-10-CM

## 2019-08-14 DIAGNOSIS — Z13.1 ENCOUNTER FOR SCREENING FOR DIABETES MELLITUS: ICD-10-CM

## 2019-08-14 DIAGNOSIS — Z23 NEED FOR PROPHYLACTIC VACCINATION AND INOCULATION AGAINST VARICELLA: ICD-10-CM

## 2019-08-14 PROCEDURE — 99213 OFFICE O/P EST LOW 20 MIN: CPT | Performed by: FAMILY MEDICINE

## 2019-08-14 RX ORDER — TRAMADOL HYDROCHLORIDE 50 MG/1
50 TABLET ORAL EVERY 6 HOURS PRN
COMMUNITY
End: 2019-09-23 | Stop reason: SDUPTHER

## 2019-08-14 ASSESSMENT — ENCOUNTER SYMPTOMS
COUGH: 0
ABDOMINAL PAIN: 0
SHORTNESS OF BREATH: 0
DIARRHEA: 0
VOMITING: 0
NAUSEA: 0

## 2019-08-14 ASSESSMENT — PATIENT HEALTH QUESTIONNAIRE - PHQ9
SUM OF ALL RESPONSES TO PHQ9 QUESTIONS 1 & 2: 1
6. FEELING BAD ABOUT YOURSELF - OR THAT YOU ARE A FAILURE OR HAVE LET YOURSELF OR YOUR FAMILY DOWN: 1
9. THOUGHTS THAT YOU WOULD BE BETTER OFF DEAD, OR OF HURTING YOURSELF: 0
10. IF YOU CHECKED OFF ANY PROBLEMS, HOW DIFFICULT HAVE THESE PROBLEMS MADE IT FOR YOU TO DO YOUR WORK, TAKE CARE OF THINGS AT HOME, OR GET ALONG WITH OTHER PEOPLE: 0
1. LITTLE INTEREST OR PLEASURE IN DOING THINGS: 0
2. FEELING DOWN, DEPRESSED OR HOPELESS: 1
SUM OF ALL RESPONSES TO PHQ QUESTIONS 1-9: 6
8. MOVING OR SPEAKING SO SLOWLY THAT OTHER PEOPLE COULD HAVE NOTICED. OR THE OPPOSITE, BEING SO FIGETY OR RESTLESS THAT YOU HAVE BEEN MOVING AROUND A LOT MORE THAN USUAL: 0
5. POOR APPETITE OR OVEREATING: 1
7. TROUBLE CONCENTRATING ON THINGS, SUCH AS READING THE NEWSPAPER OR WATCHING TELEVISION: 1
3. TROUBLE FALLING OR STAYING ASLEEP: 1
SUM OF ALL RESPONSES TO PHQ QUESTIONS 1-9: 6
4. FEELING TIRED OR HAVING LITTLE ENERGY: 1

## 2019-08-14 ASSESSMENT — ANXIETY QUESTIONNAIRES
2. NOT BEING ABLE TO STOP OR CONTROL WORRYING: 3-NEARLY EVERY DAY
GAD7 TOTAL SCORE: 14
1. FEELING NERVOUS, ANXIOUS, OR ON EDGE: 3-NEARLY EVERY DAY
5. BEING SO RESTLESS THAT IT IS HARD TO SIT STILL: 0-NOT AT ALL SURE
6. BECOMING EASILY ANNOYED OR IRRITABLE: 3-NEARLY EVERY DAY
3. WORRYING TOO MUCH ABOUT DIFFERENT THINGS: 3-NEARLY EVERY DAY
7. FEELING AFRAID AS IF SOMETHING AWFUL MIGHT HAPPEN: 1-SEVERAL DAYS
4. TROUBLE RELAXING: 1-SEVERAL DAYS

## 2019-08-31 DIAGNOSIS — Z91.09 ENVIRONMENTAL ALLERGIES: ICD-10-CM

## 2019-09-23 DIAGNOSIS — G89.29 CHRONIC LOW BACK PAIN WITHOUT SCIATICA, UNSPECIFIED BACK PAIN LATERALITY: Primary | ICD-10-CM

## 2019-09-23 DIAGNOSIS — M54.50 CHRONIC LOW BACK PAIN WITHOUT SCIATICA, UNSPECIFIED BACK PAIN LATERALITY: Primary | ICD-10-CM

## 2019-09-23 RX ORDER — LISINOPRIL 20 MG/1
20 TABLET ORAL NIGHTLY
Qty: 30 TABLET | Refills: 2 | Status: SHIPPED | OUTPATIENT
Start: 2019-09-23 | End: 2019-12-20 | Stop reason: SDUPTHER

## 2019-09-23 RX ORDER — TRAMADOL HYDROCHLORIDE 50 MG/1
50 TABLET ORAL 2 TIMES DAILY PRN
Qty: 60 TABLET | Refills: 0 | Status: SHIPPED | OUTPATIENT
Start: 2019-09-23 | End: 2019-10-23

## 2019-09-23 RX ORDER — DESOGESTREL AND ETHINYL ESTRADIOL 21-5 (28)
1 KIT ORAL EVERY MORNING
Qty: 1 PACKET | Refills: 2 | Status: SHIPPED | OUTPATIENT
Start: 2019-09-23 | End: 2019-12-20 | Stop reason: SDUPTHER

## 2019-09-23 RX ORDER — NAPROXEN 500 MG/1
500 TABLET ORAL 2 TIMES DAILY
Qty: 60 TABLET | Refills: 2 | Status: SHIPPED | OUTPATIENT
Start: 2019-09-23 | End: 2019-12-20 | Stop reason: SDUPTHER

## 2019-12-20 ENCOUNTER — OFFICE VISIT (OUTPATIENT)
Dept: FAMILY MEDICINE CLINIC | Age: 54
End: 2019-12-20
Payer: COMMERCIAL

## 2019-12-20 VITALS
WEIGHT: 290.6 LBS | BODY MASS INDEX: 51.49 KG/M2 | HEIGHT: 63 IN | HEART RATE: 95 BPM | DIASTOLIC BLOOD PRESSURE: 72 MMHG | SYSTOLIC BLOOD PRESSURE: 132 MMHG | OXYGEN SATURATION: 99 %

## 2019-12-20 DIAGNOSIS — Z23 NEED FOR INFLUENZA VACCINATION: ICD-10-CM

## 2019-12-20 DIAGNOSIS — I10 ESSENTIAL HYPERTENSION: Primary | ICD-10-CM

## 2019-12-20 DIAGNOSIS — Z12.31 ENCOUNTER FOR SCREENING MAMMOGRAM FOR BREAST CANCER: ICD-10-CM

## 2019-12-20 DIAGNOSIS — Z12.11 SCREENING FOR COLON CANCER: ICD-10-CM

## 2019-12-20 DIAGNOSIS — M25.552 PAIN OF BOTH HIP JOINTS: ICD-10-CM

## 2019-12-20 DIAGNOSIS — M25.551 PAIN OF BOTH HIP JOINTS: ICD-10-CM

## 2019-12-20 DIAGNOSIS — Z23 NEED FOR PROPHYLACTIC VACCINATION AND INOCULATION AGAINST VARICELLA: ICD-10-CM

## 2019-12-20 PROCEDURE — 90471 IMMUNIZATION ADMIN: CPT | Performed by: FAMILY MEDICINE

## 2019-12-20 PROCEDURE — 99214 OFFICE O/P EST MOD 30 MIN: CPT | Performed by: FAMILY MEDICINE

## 2019-12-20 PROCEDURE — 90686 IIV4 VACC NO PRSV 0.5 ML IM: CPT | Performed by: FAMILY MEDICINE

## 2019-12-20 RX ORDER — DESOGESTREL AND ETHINYL ESTRADIOL 21-5 (28)
1 KIT ORAL EVERY MORNING
Qty: 1 PACKET | Refills: 2 | Status: SHIPPED | OUTPATIENT
Start: 2019-12-20 | End: 2020-03-18 | Stop reason: SDUPTHER

## 2019-12-20 RX ORDER — TRAMADOL HYDROCHLORIDE 50 MG/1
50 TABLET ORAL 2 TIMES DAILY PRN
COMMUNITY
End: 2019-12-20 | Stop reason: SDUPTHER

## 2019-12-20 RX ORDER — TRAMADOL HYDROCHLORIDE 50 MG/1
50 TABLET ORAL 2 TIMES DAILY PRN
Qty: 60 TABLET | Refills: 0 | Status: SHIPPED | OUTPATIENT
Start: 2019-12-20 | End: 2020-01-19

## 2019-12-20 RX ORDER — DESOGESTREL AND ETHINYL ESTRADIOL 21-5 (28)
1 KIT ORAL EVERY MORNING
Qty: 1 PACKET | Refills: 2 | Status: CANCELLED | OUTPATIENT
Start: 2019-12-20

## 2019-12-20 RX ORDER — NAPROXEN 500 MG/1
500 TABLET ORAL 2 TIMES DAILY
Qty: 60 TABLET | Refills: 2 | Status: SHIPPED | OUTPATIENT
Start: 2019-12-20 | End: 2020-03-11 | Stop reason: SDUPTHER

## 2019-12-20 RX ORDER — LISINOPRIL 20 MG/1
20 TABLET ORAL NIGHTLY
Qty: 30 TABLET | Refills: 2 | Status: SHIPPED | OUTPATIENT
Start: 2019-12-20 | End: 2020-03-11 | Stop reason: SDUPTHER

## 2019-12-20 ASSESSMENT — ENCOUNTER SYMPTOMS
COUGH: 0
DIARRHEA: 0
VOMITING: 0
SHORTNESS OF BREATH: 0
ABDOMINAL PAIN: 0
NAUSEA: 0

## 2019-12-26 ENCOUNTER — OFFICE VISIT (OUTPATIENT)
Dept: CARDIOLOGY CLINIC | Age: 54
End: 2019-12-26
Payer: COMMERCIAL

## 2019-12-26 VITALS
HEIGHT: 63 IN | SYSTOLIC BLOOD PRESSURE: 127 MMHG | WEIGHT: 292.6 LBS | HEART RATE: 53 BPM | DIASTOLIC BLOOD PRESSURE: 79 MMHG | BODY MASS INDEX: 51.84 KG/M2

## 2019-12-26 DIAGNOSIS — I10 ESSENTIAL HYPERTENSION: ICD-10-CM

## 2019-12-26 DIAGNOSIS — I47.1 PSVT (PAROXYSMAL SUPRAVENTRICULAR TACHYCARDIA) (HCC): Primary | ICD-10-CM

## 2019-12-26 DIAGNOSIS — R00.2 INTERMITTENT PALPITATIONS: ICD-10-CM

## 2019-12-26 PROBLEM — I47.10 PSVT (PAROXYSMAL SUPRAVENTRICULAR TACHYCARDIA): Status: ACTIVE | Noted: 2019-12-26

## 2019-12-26 PROCEDURE — 99214 OFFICE O/P EST MOD 30 MIN: CPT | Performed by: INTERNAL MEDICINE

## 2020-03-11 ENCOUNTER — OFFICE VISIT (OUTPATIENT)
Dept: FAMILY MEDICINE CLINIC | Age: 55
End: 2020-03-11
Payer: COMMERCIAL

## 2020-03-11 VITALS
SYSTOLIC BLOOD PRESSURE: 132 MMHG | HEART RATE: 62 BPM | WEIGHT: 282.9 LBS | HEIGHT: 63 IN | BODY MASS INDEX: 50.12 KG/M2 | DIASTOLIC BLOOD PRESSURE: 78 MMHG | OXYGEN SATURATION: 98 %

## 2020-03-11 DIAGNOSIS — I10 ESSENTIAL HYPERTENSION: ICD-10-CM

## 2020-03-11 DIAGNOSIS — Z13.1 ENCOUNTER FOR SCREENING FOR DIABETES MELLITUS: ICD-10-CM

## 2020-03-11 PROCEDURE — 99213 OFFICE O/P EST LOW 20 MIN: CPT | Performed by: FAMILY MEDICINE

## 2020-03-11 RX ORDER — LISINOPRIL 20 MG/1
20 TABLET ORAL NIGHTLY
Qty: 30 TABLET | Refills: 2 | Status: SHIPPED | OUTPATIENT
Start: 2020-03-11 | End: 2020-06-17 | Stop reason: SDUPTHER

## 2020-03-11 RX ORDER — NAPROXEN 500 MG/1
500 TABLET ORAL 2 TIMES DAILY
Qty: 60 TABLET | Refills: 2 | Status: SHIPPED | OUTPATIENT
Start: 2020-03-11 | End: 2020-06-17 | Stop reason: SDUPTHER

## 2020-03-11 RX ORDER — TRAMADOL HYDROCHLORIDE 50 MG/1
50 TABLET ORAL EVERY 6 HOURS PRN
Qty: 60 TABLET | Refills: 0 | Status: SHIPPED | OUTPATIENT
Start: 2020-03-11 | End: 2020-03-16 | Stop reason: SDUPTHER

## 2020-03-11 RX ORDER — DESOGESTREL AND ETHINYL ESTRADIOL 21-5 (28)
1 KIT ORAL EVERY MORNING
Qty: 1 PACKET | Refills: 2 | Status: CANCELLED | OUTPATIENT
Start: 2020-03-11

## 2020-03-11 RX ORDER — TRAMADOL HYDROCHLORIDE 50 MG/1
50 TABLET ORAL EVERY 6 HOURS PRN
COMMUNITY
End: 2020-03-11 | Stop reason: SDUPTHER

## 2020-03-11 RX ORDER — OMEPRAZOLE 20 MG/1
20 CAPSULE, DELAYED RELEASE ORAL
Qty: 30 CAPSULE | Refills: 1 | Status: SHIPPED | OUTPATIENT
Start: 2020-03-11 | End: 2020-06-17

## 2020-03-11 ASSESSMENT — ENCOUNTER SYMPTOMS
COUGH: 0
ABDOMINAL PAIN: 0
SHORTNESS OF BREATH: 0

## 2020-03-11 NOTE — PROGRESS NOTES
 Depression     Environmental allergies     Granulomatous hepatitis Dx 5-96    Heart palpitations     Hyperlipidemia     Hypertension     Pneumonia Dx 4-17    Shortness of breath on exertion     Teeth missing     Upper And Lower    Wears glasses     [place] teeth extracted     4 [place] Teeth Extracted In Past       FAMILY HISTORY  Family History   Problem Relation Age of Onset    Allergy (Severe) Mother     Other Mother         Fibromyalgia    High Blood Pressure Mother     Diabetes Father         \"Pre Diabetic\"    Alcohol Abuse Father         \"Recovered Alcoholic\"    Substance Abuse Father         \"Recovered Alcoholic\"    Cancer Maternal Grandfather         prostate    Cancer Paternal Grandfather         liver    Hypertension Other         grandmother    Diabetes Other         grandmother       SOCIAL HISTORY  Social History     Socioeconomic History    Marital status:      Spouse name: None    Number of children: None    Years of education: None    Highest education level: None   Occupational History    None   Social Needs    Financial resource strain: None    Food insecurity     Worry: None     Inability: None    Transportation needs     Medical: None     Non-medical: None   Tobacco Use    Smoking status: Never Smoker    Smokeless tobacco: Never Used   Substance and Sexual Activity    Alcohol use: Yes     Comment: 1 - 2 mixed drinks a week    Drug use: No    Sexual activity: Yes     Partners: Male   Lifestyle    Physical activity     Days per week: None     Minutes per session: None    Stress: None   Relationships    Social connections     Talks on phone: None     Gets together: None     Attends Yazdanism service: None     Active member of club or organization: None     Attends meetings of clubs or organizations: None     Relationship status: None    Intimate partner violence     Fear of current or ex partner: None     Emotionally abused: None     Physically abused: None     Forced sexual activity: None   Other Topics Concern    None   Social History Narrative    None        SURGICAL HISTORY  Past Surgical History:   Procedure Laterality Date     SECTION  10/16/1987    CHOLECYSTECTOMY, LAPAROSCOPIC  1996    ENDOSCOPY, COLON, DIAGNOSTIC  1996    SINUS ENDOSCOPY Bilateral 2018    SINUS ENDOSCOPY FUNCTIONAL SURGERY BILATERAL MAXILLARY ANSTROSTOMY WITH TISSUE REMOVAL performed by Noemy Reese MD at [address] Bilateral 2018    SUBMUCOUS RESECTION TURBINOPLASTY  BILATERAL performed by Noemy Reese MD at [address] EXTRACTION      4 [place] Teeth Extracted In Past       CURRENT MEDICATIONS  Current Outpatient Medications   Medication Sig Dispense Refill    traMADol (ULTRAM) 50 MG tablet Take 50 mg by mouth every 6 hours as needed.  naproxen (NAPROSYN) 500 MG tablet Take 1 tablet by mouth 2 times daily 60 tablet 2    lisinopril (PRINIVIL;ZESTRIL) 20 MG tablet Take 1 tablet by mouth nightly 30 tablet 2    desogestrel-ethinyl estradiol (KARIVA) 0.15-0.02/0.01 MG () per tablet Take 1 tablet by mouth every morning 1 packet 2    metoprolol tartrate (LOPRESSOR) 25 MG tablet Take 1 tablet by mouth 2 times daily 60 tablet 5    GuaiFENesin (MUCINEX PO) Take 1,200 mg by mouth 2 times daily Over The Counter, \"I Take A Regular One In The Morning, I Take DM At Night\"      famotidine (PEPCID) 20 MG tablet Take 20 mg by mouth every morning Over The Counter       No current facility-administered medications for this visit. ALLERGIES  Allergies   Allergen Reactions    Ampicillin Anaphylaxis    Bactrim [Sulfamethoxazole-Trimethoprim] Anaphylaxis    Flagyl [Metronidazole]      \"Welts\"    Tape Patrisha Star Tape]      \"Tears The Skin\"       PHYSICAL EXAM    Physical Exam  Vitals signs and nursing note reviewed. Constitutional:       General: She is not in acute distress. Appearance: She is well-developed.  She completed and notified of any medication changes if necessary.  - Comprehensive Metabolic Panel; Future    5. Chronic low back pain without sciatica, unspecified back pain laterality  Continue taking medications as prescribed and refills will be provided as necessary. An OARRs report was pulled and reviewed today. A medication aggreement was placed on file. - traMADol (ULTRAM) 50 MG tablet; Take 1 tablet by mouth every 6 hours as needed for Pain for up to 30 days. Dispense: 60 tablet; Refill: 0    6. Gastroesophageal reflux disease, esophagitis presence not specified   patient will be started on Prilosec 20 mg, 1 tablet p.o. daily, to be taken 30 to 60 minutes before any food on an empty stomach. Is to call in 2 weeks with how she is doing. Patient may continue this medication if it is helping.  - omeprazole (PRILOSEC) 20 MG delayed release capsule; Take 1 capsule by mouth every morning (before breakfast)  Dispense: 30 capsule; Refill: 1    Patient is to follow-up in approximately 3 months, unless otherwise needed in that time. Pt is to call with any questions, concerns, or increase in symptoms. Pt verbalized understanding of and agreement with current plan of care. Electronically signed by LUCY Childers on 3/11/2020      Please note that this chart was generated using dragon dictation software. Although every effort was made to ensure the accuracy of this automated transcription, some errors in transcription may have occurred.

## 2020-03-12 ENCOUNTER — TELEPHONE (OUTPATIENT)
Dept: FAMILY MEDICINE CLINIC | Age: 55
End: 2020-03-12

## 2020-03-12 LAB
A/G RATIO: 1.3 (ref 1.1–2.2)
ALBUMIN SERPL-MCNC: 4 G/DL (ref 3.4–5)
ALP BLD-CCNC: 109 U/L (ref 40–129)
ALT SERPL-CCNC: 10 U/L (ref 10–40)
ANION GAP SERPL CALCULATED.3IONS-SCNC: 12 MMOL/L (ref 3–16)
AST SERPL-CCNC: 16 U/L (ref 15–37)
BASOPHILS ABSOLUTE: 0 K/UL (ref 0–0.2)
BASOPHILS RELATIVE PERCENT: 0.5 %
BILIRUB SERPL-MCNC: 0.3 MG/DL (ref 0–1)
BUN BLDV-MCNC: 15 MG/DL (ref 7–20)
CALCIUM SERPL-MCNC: 9.6 MG/DL (ref 8.3–10.6)
CHLORIDE BLD-SCNC: 108 MMOL/L (ref 99–110)
CHOLESTEROL, TOTAL: 260 MG/DL (ref 0–199)
CO2: 24 MMOL/L (ref 21–32)
CREAT SERPL-MCNC: 0.6 MG/DL (ref 0.6–1.1)
EOSINOPHILS ABSOLUTE: 0.1 K/UL (ref 0–0.6)
EOSINOPHILS RELATIVE PERCENT: 1.5 %
GFR AFRICAN AMERICAN: >60
GFR NON-AFRICAN AMERICAN: >60
GLOBULIN: 3.1 G/DL
GLUCOSE BLD-MCNC: 92 MG/DL (ref 70–99)
HCT VFR BLD CALC: 40.2 % (ref 36–48)
HDLC SERPL-MCNC: 68 MG/DL (ref 40–60)
HEMOGLOBIN: 13.3 G/DL (ref 12–16)
LDL CHOLESTEROL CALCULATED: 163 MG/DL
LYMPHOCYTES ABSOLUTE: 1.4 K/UL (ref 1–5.1)
LYMPHOCYTES RELATIVE PERCENT: 15 %
MCH RBC QN AUTO: 29.9 PG (ref 26–34)
MCHC RBC AUTO-ENTMCNC: 33.2 G/DL (ref 31–36)
MCV RBC AUTO: 90.1 FL (ref 80–100)
MONOCYTES ABSOLUTE: 0.5 K/UL (ref 0–1.3)
MONOCYTES RELATIVE PERCENT: 5.6 %
NEUTROPHILS ABSOLUTE: 7.2 K/UL (ref 1.7–7.7)
NEUTROPHILS RELATIVE PERCENT: 77.4 %
PDW BLD-RTO: 14 % (ref 12.4–15.4)
PLATELET # BLD: 352 K/UL (ref 135–450)
PMV BLD AUTO: 8 FL (ref 5–10.5)
POTASSIUM SERPL-SCNC: 4.7 MMOL/L (ref 3.5–5.1)
RBC # BLD: 4.46 M/UL (ref 4–5.2)
SODIUM BLD-SCNC: 144 MMOL/L (ref 136–145)
TOTAL PROTEIN: 7.1 G/DL (ref 6.4–8.2)
TRIGL SERPL-MCNC: 146 MG/DL (ref 0–150)
VLDLC SERPL CALC-MCNC: 29 MG/DL
WBC # BLD: 9.3 K/UL (ref 4–11)

## 2020-03-12 NOTE — TELEPHONE ENCOUNTER
Martin at 1037am abiola moise below per Gretel AYALA patient is to call if she has any further questions or concerns. Electronically signed by Noemy Rubin LPN on 6/79/4945 at 12:90 AM    ----- Message from Damaris Edwards sent at 3/12/2020 10:06 AM EDT -----  CBC and CMP look great. Cholesterol is very elevated from previous. Her bad cholesterol is elevated. I know you were not specifically fasting for at least 8 hours. Please follow a low-fat low-cholesterol diet and increase activity as tolerated. Per guidelines we do not need to start a statin medication quite yet to lower cholesterol and I would like to hold off as long as possible, but if it increases any more we should consider getting you started on that medication. Will recheck at your next visit.     Thanks, Ness

## 2020-03-13 ENCOUNTER — TELEPHONE (OUTPATIENT)
Dept: FAMILY MEDICINE CLINIC | Age: 55
End: 2020-03-13

## 2020-03-13 NOTE — TELEPHONE ENCOUNTER
If this is regarding treating her with medication for the cholesterol I would be in favor to starting that because the value was so high even though she might need a little bit  Because she has high blood pressure we want to be aggressive about this would suggest she be started on atorvastatin 40 mg daily  She is agreeable  go ahead and set that up and I will sign

## 2020-03-16 ENCOUNTER — TELEPHONE (OUTPATIENT)
Dept: FAMILY MEDICINE CLINIC | Age: 55
End: 2020-03-16

## 2020-03-16 RX ORDER — TRAMADOL HYDROCHLORIDE 50 MG/1
50 TABLET ORAL EVERY 6 HOURS PRN
Qty: 60 TABLET | Refills: 0 | Status: SHIPPED | OUTPATIENT
Start: 2020-03-16 | End: 2020-06-19 | Stop reason: SDUPTHER

## 2020-03-16 NOTE — TELEPHONE ENCOUNTER
Tramodol was called over twice-- the first one is in a prior authorization and the second one was just sent over today-- The pharm was confused if maybe we didn't understand that the insurance required a prior auth or if we accidentally sent two prescriptions over

## 2020-03-17 ENCOUNTER — TELEPHONE (OUTPATIENT)
Dept: FAMILY MEDICINE CLINIC | Age: 55
End: 2020-03-17

## 2020-03-17 NOTE — TELEPHONE ENCOUNTER
I spoke with Dr. Bautista Skinner and we will do both. We can do the estrogen cream and have her stay on the birth control if she would like we could also stop the birth control if she would like. Has she been getting the OCP from us or from an OB/GYN (please apologize I cannot remember what we discussed)?

## 2020-03-17 NOTE — TELEPHONE ENCOUNTER
TO LON:    PATIENT IS CALLING AGAIN TO FIND OUT THE STATUS OF THE PRIOR AUTHORIZATION FOR THE TRAMADOL AND----TO FIND OUT IF YOU WANT HER TO TRY A VAGINAL CREAM (ESTROGEN) AND STAY ON THE BIRTH CONTROL? IF YOU WANT HER TO STAY ON THE BIRTH CONTROL, SHE SHOULD HAVE STARTED A PACK ON Sunday. PLEASE CALL HER WITH ANY UPDATE TODAY; SHE WANTS TO KNOW WHAT TO EXPECT. SHE HAS HER PHONE WITH HER AND YOU CAN LEAVE A DETAILED MESSAGE.

## 2020-03-18 RX ORDER — ESTRADIOL 0.1 MG/G
CREAM VAGINAL
Qty: 1 TUBE | Refills: 3 | Status: SHIPPED | OUTPATIENT
Start: 2020-03-18 | End: 2021-07-01

## 2020-03-19 RX ORDER — DESOGESTREL AND ETHINYL ESTRADIOL 21-5 (28)
1 KIT ORAL EVERY MORNING
Qty: 1 PACKET | Refills: 2 | Status: SHIPPED | OUTPATIENT
Start: 2020-03-19 | End: 2020-08-28 | Stop reason: SDUPTHER

## 2020-03-19 RX ORDER — DESOGESTREL AND ETHINYL ESTRADIOL 21-5 (28)
1 KIT ORAL EVERY MORNING
Qty: 1 PACKET | Refills: 2 | Status: SHIPPED | OUTPATIENT
Start: 2020-03-19 | End: 2020-06-17 | Stop reason: SDUPTHER

## 2020-03-20 ENCOUNTER — TELEPHONE (OUTPATIENT)
Dept: FAMILY MEDICINE CLINIC | Age: 55
End: 2020-03-20

## 2020-06-17 ENCOUNTER — TELEMEDICINE (OUTPATIENT)
Dept: FAMILY MEDICINE CLINIC | Age: 55
End: 2020-06-17
Payer: COMMERCIAL

## 2020-06-17 PROCEDURE — 99213 OFFICE O/P EST LOW 20 MIN: CPT | Performed by: FAMILY MEDICINE

## 2020-06-17 RX ORDER — LISINOPRIL 20 MG/1
20 TABLET ORAL NIGHTLY
Qty: 30 TABLET | Refills: 2 | Status: SHIPPED | OUTPATIENT
Start: 2020-06-17 | End: 2020-09-26 | Stop reason: SDUPTHER

## 2020-06-17 RX ORDER — NAPROXEN 500 MG/1
500 TABLET ORAL 2 TIMES DAILY
Qty: 60 TABLET | Refills: 2 | Status: SHIPPED | OUTPATIENT
Start: 2020-06-17 | End: 2020-09-26 | Stop reason: SDUPTHER

## 2020-06-17 RX ORDER — OMEPRAZOLE 20 MG/1
20 CAPSULE, DELAYED RELEASE ORAL
Qty: 90 CAPSULE | Refills: 1 | Status: SHIPPED | OUTPATIENT
Start: 2020-06-17 | End: 2020-09-17 | Stop reason: SDUPTHER

## 2020-06-17 ASSESSMENT — ENCOUNTER SYMPTOMS
COUGH: 1
ABDOMINAL PAIN: 0
SHORTNESS OF BREATH: 0

## 2020-06-17 NOTE — PROGRESS NOTES
capsule; Refill: 1    We will try to follow-up with patient's insurance and complete a second prior authorization for her tramadol otherwise we will trial what medications they would like us to do before approving the tramadol. Patient is to follow-up in approximately 3 months, unless otherwise needed in that time. Pt is to call with any questions, concerns, or increase in symptoms. Pt verbalized understanding of and agreement with current plan of care. No follow-ups on file. Mame Perez is a 63 y.o. female being evaluated by a Virtual Visit (video visit) encounter to address concerns as mentioned above. A caregiver was present when appropriate. Due to this being a TeleHealth encounter (During [facility]-30 public health emergency), evaluation of the following organ systems was limited: Vitals/Constitutional/EENT/Resp/CV/GI//MS/Neuro/Skin/Heme-Lymph-Imm. Pursuant to the emergency declaration under the [address] [place] [address] authority and the [employer] and [employer] Act, this Virtual Visit was conducted with patient's (and/or legal guardian's) consent, to reduce the patient's risk of exposure to COVID-19 and provide necessary medical care. The patient (and/or legal guardian) has also been advised to contact this office for worsening conditions or problems, and seek emergency medical treatment and/or call 911 if deemed necessary. Patient identification was verified at the start of the visit: Yes    Total time spent on this encounter: 15 minutes     Services were provided through a video synchronous discussion virtually to substitute for in-person clinic visit. Patient and provider were located at their individual homes. --LUCY Clark on 6/17/2020 at 11:39 AM    An electronic signature was used to authenticate this note.

## 2020-06-18 ENCOUNTER — TELEPHONE (OUTPATIENT)
Dept: FAMILY MEDICINE CLINIC | Age: 55
End: 2020-06-18

## 2020-06-22 RX ORDER — TRAMADOL HYDROCHLORIDE 50 MG/1
50 TABLET ORAL EVERY 6 HOURS PRN
Qty: 60 TABLET | Refills: 0 | Status: SHIPPED | OUTPATIENT
Start: 2020-06-22 | End: 2020-09-17 | Stop reason: SDUPTHER

## 2020-08-28 RX ORDER — DESOGESTREL AND ETHINYL ESTRADIOL 21-5 (28)
1 KIT ORAL EVERY MORNING
Qty: 1 PACKET | Refills: 2 | Status: SHIPPED | OUTPATIENT
Start: 2020-08-28 | End: 2020-11-20

## 2020-09-17 ENCOUNTER — OFFICE VISIT (OUTPATIENT)
Dept: FAMILY MEDICINE CLINIC | Age: 55
End: 2020-09-17
Payer: COMMERCIAL

## 2020-09-17 VITALS
OXYGEN SATURATION: 99 % | BODY MASS INDEX: 50.64 KG/M2 | DIASTOLIC BLOOD PRESSURE: 72 MMHG | SYSTOLIC BLOOD PRESSURE: 110 MMHG | TEMPERATURE: 97.3 F | HEIGHT: 63 IN | WEIGHT: 285.8 LBS | HEART RATE: 64 BPM

## 2020-09-17 PROBLEM — E78.49 OTHER HYPERLIPIDEMIA: Status: ACTIVE | Noted: 2020-09-17

## 2020-09-17 PROBLEM — M54.50 CHRONIC LOW BACK PAIN WITHOUT SCIATICA: Status: ACTIVE | Noted: 2020-09-17

## 2020-09-17 PROBLEM — G89.29 CHRONIC LOW BACK PAIN WITHOUT SCIATICA: Status: ACTIVE | Noted: 2020-09-17

## 2020-09-17 PROCEDURE — 90471 IMMUNIZATION ADMIN: CPT | Performed by: FAMILY MEDICINE

## 2020-09-17 PROCEDURE — 99214 OFFICE O/P EST MOD 30 MIN: CPT | Performed by: FAMILY MEDICINE

## 2020-09-17 PROCEDURE — 90686 IIV4 VACC NO PRSV 0.5 ML IM: CPT | Performed by: FAMILY MEDICINE

## 2020-09-17 RX ORDER — TRAMADOL HYDROCHLORIDE 50 MG/1
50 TABLET ORAL EVERY 6 HOURS PRN
Qty: 60 TABLET | Refills: 0 | Status: SHIPPED | OUTPATIENT
Start: 2020-09-17 | End: 2020-10-15 | Stop reason: SDUPTHER

## 2020-09-17 RX ORDER — OMEPRAZOLE 20 MG/1
20 CAPSULE, DELAYED RELEASE ORAL
Qty: 90 CAPSULE | Refills: 1 | Status: SHIPPED | OUTPATIENT
Start: 2020-09-17 | End: 2021-07-01 | Stop reason: SDUPTHER

## 2020-09-17 ASSESSMENT — ENCOUNTER SYMPTOMS
COUGH: 0
DIARRHEA: 0
SHORTNESS OF BREATH: 0
SORE THROAT: 0
BACK PAIN: 1

## 2020-09-17 ASSESSMENT — PATIENT HEALTH QUESTIONNAIRE - PHQ9
SUM OF ALL RESPONSES TO PHQ QUESTIONS 1-9: 0
SUM OF ALL RESPONSES TO PHQ9 QUESTIONS 1 & 2: 0
1. LITTLE INTEREST OR PLEASURE IN DOING THINGS: 0
2. FEELING DOWN, DEPRESSED OR HOPELESS: 0
SUM OF ALL RESPONSES TO PHQ QUESTIONS 1-9: 0

## 2020-09-17 NOTE — PROGRESS NOTES
2020     Jose Yoo      Chief Complaint   Patient presents with    3 Month Follow-Up    Medication Refill    Flu Vaccine     Today       HPI      Rhina Garcia is a 63 y.o. female who presents today with the followin. Gastroesophageal reflux disease, esophagitis presence not specified    2. Chronic low back pain without sciatica, unspecified back pain laterality    3. Encounter for screening mammogram for breast cancer    4. Need for prophylactic vaccination against diphtheria-tetanus-pertussis (DTP)    5. Need for prophylactic vaccination and inoculation against varicella    6. Needs flu shot    Here for follow-up of multiple problems  She has history of chronic low back pain she takes narcotic pain medication she is in compliance with controlled substance contract and appropriate monitoring is been done  There is no signs of diversion or abuse  The patient cannot take nonsteroidal anti-inflammatories because of the liver condition    REVIEW OF SYMPTOMS    Review of Systems   Constitutional: Negative for activity change, fever and unexpected weight change. 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 Respiratory: Negative for cough and shortness of breath. Cardiovascular: Positive for palpitations. History of arrhythmia that is never really been well documented  She has had it for years she has palpitations she had a cardiac work-up which is negative   Gastrointestinal: Negative for diarrhea. Has history of granulomatous hepatitis but that is in remission currently   Genitourinary: On hrt  Due for Pap smear and and she said she would set both of these up a mammogram as well   Musculoskeletal: Positive for arthralgias and back pain. Neurological: Negative for dizziness and light-headedness. organization: None     Attends meetings of clubs or organizations: None     Relationship status: None    Intimate partner violence     Fear of current or ex partner: None     Emotionally abused: None     Physically abused: None     Forced sexual activity: None   Other Topics Concern    None   Social History Narrative    None        SURGICAL HISTORY  Past Surgical History:   Procedure Laterality Date     SECTION  10/16/1987    CHOLECYSTECTOMY, LAPAROSCOPIC  1996    ENDOSCOPY, COLON, DIAGNOSTIC  1996    SINUS ENDOSCOPY Bilateral 2018    SINUS ENDOSCOPY FUNCTIONAL SURGERY BILATERAL MAXILLARY ANSTROSTOMY WITH TISSUE REMOVAL performed by Abhijeet Foy MD at [facility] Bilateral 2018    SUBMUCOUS RESECTION TURBINOPLASTY  BILATERAL performed by Abhijeet Foy MD at [address]      4 [place] Teeth Extracted In Past       CURRENT MEDICATIONS  Current Outpatient Medications   Medication Sig Dispense Refill    desogestrel-ethinyl estradiol (Aric Poplar) 0.15-0.02/0.01 MG () per tablet Take 1 tablet by mouth every morning 1 packet 2    naproxen (NAPROSYN) 500 MG tablet Take 1 tablet by mouth 2 times daily 60 tablet 2    lisinopril (PRINIVIL;ZESTRIL) 20 MG tablet Take 1 tablet by mouth nightly 30 tablet 2    omeprazole (PRILOSEC) 20 MG delayed release capsule Take 1 capsule by mouth every morning (before breakfast) 90 capsule 1    metoprolol tartrate (LOPRESSOR) 25 MG tablet Take 1 tablet by mouth 2 times daily 60 tablet 5    estradiol (ESTRACE VAGINAL) 0.1 MG/GM vaginal cream Place 1 g vaginally daily x1 week, we then placed 1 g vaginally 3 times per week  each dose at least by day.  1 Tube 3    GuaiFENesin (MUCINEX PO) Take 1,200 mg by mouth 2 times daily Over The Counter, \"I Take A Regular One In The Morning, I Take DM At Night\"      famotidine (PEPCID) 20 MG tablet Take 20 mg by mouth every morning Over The Counter       No current facility-administered medications for this visit. ALLERGIES  Allergies   Allergen Reactions    Ampicillin Anaphylaxis    Bactrim [Sulfamethoxazole-Trimethoprim] Anaphylaxis    Flagyl [Metronidazole]      \"Welts\"    Tape Kayla Dy Tape]      \"Tears The Skin\"       PHYSICAL EXAM    /72 (Site: Right Upper Arm, Position: Sitting, Cuff Size: Large Adult)   Pulse 64   Temp 97.3 °F (36.3 °C) (Temporal)   Ht 5' 3\" (1.6 m)   Wt 285 lb 12.8 oz (129.6 kg)   SpO2 99%   BMI 50.63 kg/m²     Physical Exam  Vitals signs and nursing note reviewed. Constitutional:       General: She is not in acute distress. Appearance: Normal appearance. She is obese. HENT:      Right Ear: External ear normal.      Left Ear: External ear normal.   Eyes:      Conjunctiva/sclera: Conjunctivae normal.   Cardiovascular:      Rate and Rhythm: Normal rate. Pulmonary:      Effort: Pulmonary effort is normal. No respiratory distress. Neurological:      General: No focal deficit present. Mental Status: She is alert. Mental status is at baseline. Psychiatric:         Mood and Affect: Mood normal.         Thought Content: Thought content normal.     Immunizations reviewed  Most recent available labs reviewed  It should be noted that she had Tdap at an outside hospital    ASSESSMENT and Ghassan Everett was seen today for 3 month follow-up, medication refill and flu vaccine.     Diagnoses and all orders for this visit:    Gastroesophageal reflux disease, esophagitis presence not specified    Chronic low back pain without sciatica, unspecified back pain laterality    Encounter for screening mammogram for breast cancer    Need for prophylactic vaccination against diphtheria-tetanus-pertussis (DTP)    Need for prophylactic vaccination and inoculation against varicella    Needs flu shot  -     Influenza, Quadv, 3 yrs and older, IM, PF, Prefill Syr or SDV, 0.5mL (AFLURIA QUADV, PF)    Flu vaccine today  Continue same medications  Schedule Pap smear  Get a mammogram  Follow-up here in 3 months  PDMP was reviewed today    No follow-ups on file. Electronically signed by Shelby Franco MD on 9/17/2020    Please note that this chart was generated using dragon dictation software. Although every effort was made to ensure the accuracy of this automated transcription, some errors in transcription may have occurred.

## 2020-09-28 RX ORDER — LISINOPRIL 20 MG/1
20 TABLET ORAL NIGHTLY
Qty: 30 TABLET | Refills: 2 | Status: SHIPPED | OUTPATIENT
Start: 2020-09-28 | End: 2020-12-22 | Stop reason: SDUPTHER

## 2020-09-28 RX ORDER — NAPROXEN 500 MG/1
500 TABLET ORAL 2 TIMES DAILY
Qty: 60 TABLET | Refills: 2 | Status: SHIPPED | OUTPATIENT
Start: 2020-09-28 | End: 2020-12-22 | Stop reason: SDUPTHER

## 2020-10-16 RX ORDER — TRAMADOL HYDROCHLORIDE 50 MG/1
50 TABLET ORAL EVERY 6 HOURS PRN
Qty: 60 TABLET | Refills: 0 | Status: SHIPPED | OUTPATIENT
Start: 2020-10-16 | End: 2021-04-01 | Stop reason: SDUPTHER

## 2020-11-20 RX ORDER — DESOGESTREL/ETHINYL ESTRADIOL AND ETHINYL ESTRADIOL 21-5 (28)
KIT ORAL
Qty: 28 TABLET | Refills: 0 | Status: SHIPPED | OUTPATIENT
Start: 2020-11-20 | End: 2020-12-17 | Stop reason: SDUPTHER

## 2020-11-20 NOTE — TELEPHONE ENCOUNTER
Requested Prescriptions     Signed Prescriptions Disp Refills    AZURETTE 0.15-0.02/0.01 MG (21/5) per tablet 28 tablet 0     Sig: TAKE 1 TABLET BY MOUTH ONCE DAILY IN THE MORNING     Authorizing Provider: Dean Echeverria     Ordering User: Selma Eye

## 2020-12-07 ENCOUNTER — TELEMEDICINE (OUTPATIENT)
Dept: CARDIOLOGY CLINIC | Age: 55
End: 2020-12-07
Payer: COMMERCIAL

## 2020-12-07 PROCEDURE — 99214 OFFICE O/P EST MOD 30 MIN: CPT | Performed by: INTERNAL MEDICINE

## 2020-12-07 NOTE — PROGRESS NOTES
Vincenzo Martinez is a 63 y.o. female who is seen via Virtual Video MyChart visit. Vincenzo Martinez was at home. Provider was present at Cardiology clinic. Cardiology staff assisted.     No chief complaint on file.       Pt here for a 12 month f/u     Occasional palpitations-  Short lasting   Stable not worse     Denied cp, sob or dizziness or edema     Occasional palpitations- UNCHANGED     TWO EPISODES OF PROLONGED PALPITATION FOR 20 MIN RATE WAS AROUND 160'S ON PULSE Ox AND RESOLVED on arrival at ED  pOSSIBLE PSVT     NOW GET OCCASIONAL PALPITATION     NO cp     nevere smoked     FHx     NO cad  Ankle had cad at age 35 with CABG      Patient Active Problem List   Diagnosis    Granulomatous hepatitis    Intermittent palpitations    Essential hypertension    Obesity, morbid (HCC)    Maxillary sinusitis, chronic    Environmental allergies    possible PSVT (paroxysmal supraventricular tachycardia) (HCC)- once in 6 month, on pulse ox 160 bpm for 20 min and resolved on 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)    Chronic low back pain without sciatica    Other hyperlipidemia       Past Surgical History:   Procedure Laterality Date     SECTION  10/16/1987    CHOLECYSTECTOMY, LAPAROSCOPIC  1996    ENDOSCOPY, COLON, DIAGNOSTIC  1996    SINUS ENDOSCOPY Bilateral 2018    SINUS ENDOSCOPY FUNCTIONAL SURGERY BILATERAL MAXILLARY ANSTROSTOMY WITH TISSUE REMOVAL performed by Larisa Krishna MD [address] Bilateral 2018    SUBMUCOUS RESECTION TURBINOPLASTY  BILATERAL performed by Larisa Krishna MD at [address]      4 [place] Teeth Extracted In Past       Allergies   Allergen Reactions    Ampicillin Anaphylaxis    Bactrim [Sulfamethoxazole-Trimethoprim] Anaphylaxis    Flagyl [Metronidazole]      \"Welts\"    Tape Ressie Men Tape]      \"Tears The Skin\"        Family History   Problem Relation Age of Onset    Allergy (Severe) Mother    Glory Huynh Other Mother Fibromyalgia    High Blood Pressure Mother     Diabetes Father         \"Pre Diabetic\"    Alcohol Abuse Father         \"Recovered Alcoholic\"    Substance Abuse Father         \"Recovered Alcoholic\"    Cancer Maternal Grandfather         prostate    Cancer Paternal Grandfather         liver    Hypertension Other         grandmother    Diabetes Other         grandmother        Social History     Socioeconomic History    Marital status:      Spouse name: Not on file    Number of children: Not on file    Years of education: Not on file    Highest education level: Not on file   Occupational History    Not on file   Social Needs    Financial resource strain: Not on file    Food insecurity     Worry: Not on file     Inability: Not on file   [employer] needs     Medical: Not on file     Non-medical: Not on file   Tobacco Use    Smoking status: Never Smoker    Smokeless tobacco: Never Used   Substance and Sexual Activity    Alcohol use: Yes     Comment: 1 - 2 mixed drinks a week    Drug use: No    Sexual activity: Yes     Partners: Male   Lifestyle    Physical activity     Days per week: Not on file     Minutes per session: Not on file    Stress: Not on file   Relationships    Social connections     Talks on phone: Not on file     Gets together: Not on file     Attends Nondenominational service: Not on file     Active member of club or organization: Not on file     Attends meetings of clubs or organizations: Not on file     Relationship status: Not on file    Intimate partner violence     Fear of current or ex partner: Not on file     Emotionally abused: Not on file     Physically abused: Not on file     Forced sexual activity: Not on file   Other Topics Concern    Not on file   Social History Narrative    Not on file       Current Outpatient Medications   Medication Sig Dispense Refill    metoprolol tartrate (LOPRESSOR) 25 MG tablet Take 1 tablet by mouth 2 times daily 180 tablet 3    AZURETTE 0.15-0.02/0.01 MG (21/5) per tablet TAKE 1 TABLET BY MOUTH ONCE DAILY IN THE MORNING 28 tablet 0    naproxen (NAPROSYN) 500 MG tablet Take 1 tablet by mouth 2 times daily 60 tablet 2    lisinopril (PRINIVIL;ZESTRIL) 20 MG tablet Take 1 tablet by mouth nightly 30 tablet 2    omeprazole (PRILOSEC) 20 MG delayed release capsule Take 1 capsule by mouth every morning (before breakfast) 90 capsule 1    estradiol (ESTRACE VAGINAL) 0.1 MG/GM vaginal cream Place 1 g vaginally daily x1 week, we then placed 1 g vaginally 3 times per week  each dose at least by day. 1 Tube 3    GuaiFENesin (MUCINEX PO) Take 1,200 mg by mouth 2 times daily Over The Counter, \"I Take A Regular One In The Morning, I Take DM At Night\"      famotidine (PEPCID) 20 MG tablet Take 20 mg by mouth every morning Over The Counter       No current facility-administered medications for this visit. Review of Systems -     General ROS: negative  Psychological ROS: negative  Hematological and Lymphatic ROS: No history of blood clots or bleeding disorder. Respiratory ROS: no cough,  or wheezing, the rest see HPI  Cardiovascular ROS: See HPI  Gastrointestinal ROS: negative  Genito-Urinary ROS: no dysuria, trouble voiding, or hematuria  Musculoskeletal ROS: negative  Neurological ROS: no TIA or stroke symptoms  Dermatological ROS: negative      not currently breastfeeding.         Physical Examination:    /72  HR 64  O2 sat 97 %    General appearance - alert, well appearing, and in no distress  HEENT- Normal structure of the ear, nose and eye normal occular movement  No strabismus, no pitosis  Chest- normal breathing pattern , obvious mass and no distress  Mental status - alert, oriented to person, place, and time  Neck -NO goiter no JVD, no neck mass  Skin - normal coloration , no rashes, no suspicious skin lesions noted  Psych-  communicate well,\  appropriate mood and affect    Lab  No results for input(s): CKTOTAL, CKMB, Shan Taylor in the last 72 hours. CBC:   Lab Results   Component Value Date    WBC 9.3 03/11/2020    RBC 4.46 03/11/2020    HGB 13.3 03/11/2020    HCT 40.2 03/11/2020    MCV 90.1 03/11/2020    MCH 29.9 03/11/2020    MCHC 33.2 03/11/2020    RDW 14.0 03/11/2020     03/11/2020    MPV 8.0 03/11/2020     BMP:    Lab Results   Component Value Date     03/11/2020    K 4.7 03/11/2020     03/11/2020    CO2 24 03/11/2020    BUN 15 03/11/2020    LABALBU 4.0 03/11/2020    CREATININE 0.6 03/11/2020    CALCIUM 9.6 03/11/2020    GFRAA >60 03/11/2020    LABGLOM >60 03/11/2020    LABGLOM 87 04/27/2019    GLUCOSE 92 03/11/2020     Hepatic Function Panel:    Lab Results   Component Value Date    ALKPHOS 109 03/11/2020    ALT 10 03/11/2020    AST 16 03/11/2020    PROT 7.1 03/11/2020    BILITOT 0.3 03/11/2020    LABALBU 4.0 03/11/2020     Magnesium:    Lab Results   Component Value Date    MG 2.0 04/27/2019     Warfarin PT/INR:  No components found for: PTPATWAR, PTINRWAR  HgBA1c:  No results found for: LABA1C  FLP:    Lab Results   Component Value Date    TRIG 146 03/11/2020    HDL 68 03/11/2020    LDLCALC 163 03/11/2020    LABVLDL 29 03/11/2020     TSH:    Lab Results   Component Value Date    TSH 2.920 09/15/2018           Assessment   Diagnosis Orders   1. 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 Essential hypertension     3. Intermittent palpitations                   Plan   The current meds and labs  Reviewed     Continue the current treatment and with constant vigilance to changes in symptoms and also any potential side effects.   Return for care or seek medical attention immediately if symptoms got worse and/or develop new symptoms.     Palpitations- occasional  Holter is okay  nuc stress and  Echo- WNL   avoided pseudofed tab and get Rx for allergic

## 2020-12-08 ENCOUNTER — TELEPHONE (OUTPATIENT)
Dept: CARDIOLOGY CLINIC | Age: 55
End: 2020-12-08

## 2020-12-08 NOTE — TELEPHONE ENCOUNTER
Called pt to fu from [facility] with dr Michael Hart yesterday(12-7-2020) and make fu appt. LM for pt to call office back.

## 2020-12-17 RX ORDER — DESOGESTREL AND ETHINYL ESTRADIOL 21-5 (28)
1 KIT ORAL DAILY
Qty: 28 TABLET | Refills: 0 | Status: SHIPPED | OUTPATIENT
Start: 2020-12-17 | End: 2020-12-22 | Stop reason: SDUPTHER

## 2020-12-22 ENCOUNTER — TELEMEDICINE (OUTPATIENT)
Dept: FAMILY MEDICINE CLINIC | Age: 55
End: 2020-12-22
Payer: COMMERCIAL

## 2020-12-22 PROCEDURE — 99213 OFFICE O/P EST LOW 20 MIN: CPT | Performed by: FAMILY MEDICINE

## 2020-12-22 RX ORDER — LISINOPRIL 20 MG/1
20 TABLET ORAL NIGHTLY
Qty: 30 TABLET | Refills: 2 | Status: SHIPPED | OUTPATIENT
Start: 2020-12-22 | End: 2021-04-01 | Stop reason: SDUPTHER

## 2020-12-22 RX ORDER — DESOGESTREL AND ETHINYL ESTRADIOL 21-5 (28)
1 KIT ORAL DAILY
Qty: 28 TABLET | Refills: 3 | Status: SHIPPED | OUTPATIENT
Start: 2020-12-22 | End: 2021-04-01 | Stop reason: ALTCHOICE

## 2020-12-22 RX ORDER — NAPROXEN 500 MG/1
500 TABLET ORAL 2 TIMES DAILY
Qty: 60 TABLET | Refills: 2 | Status: SHIPPED | OUTPATIENT
Start: 2020-12-22 | End: 2021-04-01 | Stop reason: SDUPTHER

## 2020-12-22 ASSESSMENT — ENCOUNTER SYMPTOMS
COUGH: 1
RHINORRHEA: 1
EYES NEGATIVE: 1
SHORTNESS OF BREATH: 1

## 2020-12-22 NOTE — PROGRESS NOTES
2020    TELEHEALTH EVALUATION -- Audio/Visual (During RDTUQ-28 public health emergency)    HPI:  Patient needed her routine medicines renewed but more importantly she was diagnosed with Covid infection  She is 8days post development of her first symptom  She has some shortness of breath and cough that improves predominantly sinus congestion  She works as a nurse in a hospital in [place]  She is not scheduled to go back to work until another 5 days or so which would be past her quarantine time      [employer] (:  1965) has requested an audio/video evaluation for the following concern(s):        Review of Systems   Constitutional: Positive for activity change. Negative for fever and unexpected weight change. HENT: Positive for congestion and rhinorrhea. Eyes: Negative. Respiratory: Positive for cough and shortness of breath. Cardiovascular:        History of hypertension doing well on her current medicine   Gastrointestinal:        Chronic granulomatous hepatitis which is stable   Psychiatric/Behavioral: Negative. Prior to Visit Medications    Medication Sig Taking?  Authorizing Provider   desogestrel-ethinyl estradiol (AZURETTE) 0.15-0.02/0.01 MG () per tablet Take 1 tablet by mouth daily Yes Denise Laird MD   lisinopril (PRINIVIL;ZESTRIL) 20 MG tablet Take 1 tablet by mouth nightly Yes Denise Laird MD   naproxen (NAPROSYN) 500 MG tablet Take 1 tablet by mouth 2 times daily Yes Denise Laird MD   metoprolol tartrate (LOPRESSOR) 25 MG tablet Take 1 tablet by mouth 2 times daily Yes Colleen Hernandez MD   omeprazole (PRILOSEC) 20 MG delayed release capsule Take 1 capsule by mouth every morning (before breakfast) Yes Denise Laird MD   GuaiFENesin (MUCINEX PO) Take 1,200 mg by mouth 2 times daily Over The Counter, \"I Take A Regular One In The Morning, I Take DM At Night\" Yes Historical Provider, MD estradiol (ESTRACE VAGINAL) 0.1 MG/GM vaginal cream Place 1 g vaginally daily x1 week, we then placed 1 g vaginally 3 times per week  each dose at least by day. Patient not taking: Reported on 12/22/2020  LUCY An   famotidine (PEPCID) 20 MG tablet Take 20 mg by mouth every morning Over The Counter  Historical Provider, MD       Social History     Tobacco Use    Smoking status: Never Smoker    Smokeless tobacco: Never Used   Substance Use Topics    Alcohol use: Yes     Comment: 1 - 2 mixed drinks a week    Drug use: No            PHYSICAL EXAMINATION:  [ INSTRUCTIONS:  \"[x]\" Indicates a positive item  \"[]\" Indicates a negative item  -- DELETE ALL ITEMS NOT EXAMINED]  Vital Signs: (As obtained by patient/caregiver or practitioner observation)    Blood pressure-  Heart rate-    Respiratory rate-    Temperature-  Pulse oximetry-     Constitutional: [x] Appears well-developed and well-nourished [x] No apparent distress      [] Abnormal-   Mental status  [x] Alert and awake  [x] Oriented to person/place/time [x]Able to follow commands      Eyes:  EOM    []  Normal  [] Abnormal-  Sclera  []  Normal  [] Abnormal -         Discharge []  None visible  [] Abnormal -    HENT:   [x] Normocephalic, atraumatic.   [] Abnormal   [] Mouth/Throat: Mucous membranes are moist.     External Ears [x] Normal  [] Abnormal-     Neck: [x] No visualized mass     Pulmonary/Chest: [x] Respiratory effort normal.  [x] No visualized signs of difficulty breathing or respiratory distress        [] Abnormal-      Musculoskeletal:   [] Normal gait with no signs of ataxia         [] Normal range of motion of neck        [] Abnormal-       Neurological:        [x] No Facial Asymmetry (Cranial nerve 7 motor function) (limited exam to video visit)          [] No gaze palsy        [] Abnormal-         Skin:        [x] No significant exanthematous lesions or discoloration noted on facial skin         [] Abnormal- Psychiatric:       [x] Normal Affect [] No Hallucinations        [] Abnormal-     Other pertinent observable physical exam findings-     ASSESSMENT/PLAN:  1. Essential hypertension    - lisinopril (PRINIVIL;ZESTRIL) 20 MG tablet; Take 1 tablet by mouth nightly  Dispense: 30 tablet; Refill: 2    2. Pain of both hip joints    - naproxen (NAPROSYN) 500 MG tablet; Take 1 tablet by mouth 2 times daily  Dispense: 60 tablet; Refill: 2  COVID-19 infection  Should be self-limited  May return to work on her scheduled the date as this is past the quarantine time  Her  is being quarantined  Follow-up here in 3 months    No follow-ups on file. Rachell Bernal is a 63 y.o. female being evaluated by a Virtual Visit (video visit) encounter to address concerns as mentioned above. A caregiver was present when appropriate. Due to this being a TeleHealth encounter (During [facility]), evaluation of the following organ systems was limited: Vitals/Constitutional/EENT/Resp/CV/GI//MS/Neuro/Skin/Heme-Lymph-Imm. Pursuant to the emergency declaration under the [address] [place] [address], [address] [place] [address] authority and the [employer] Act, this Virtual Visit was conducted with patient's (and/or legal guardian's) consent, to reduce the patient's risk of exposure to COVID-19 and provide necessary medical care. The patient (and/or legal guardian) has also been advised to contact this office for worsening conditions or problems, and seek emergency medical treatment and/or call 911 if deemed necessary. Patient identification was verified at the start of the visit: Yes    Total time spent on this encounter: Not billed by time    Services were provided through a video synchronous discussion virtually to substitute for in-person clinic visit. Patient and provider were located at their individual homes. --Hobert Runner, MD on 12/22/2020 at 2:40 PM    An electronic signature was used to authenticate this note.

## 2021-04-01 ENCOUNTER — OFFICE VISIT (OUTPATIENT)
Dept: FAMILY MEDICINE CLINIC | Age: 56
End: 2021-04-01
Payer: COMMERCIAL

## 2021-04-01 ENCOUNTER — TELEPHONE (OUTPATIENT)
Dept: FAMILY MEDICINE CLINIC | Age: 56
End: 2021-04-01

## 2021-04-01 VITALS
BODY MASS INDEX: 48.83 KG/M2 | HEIGHT: 63 IN | OXYGEN SATURATION: 99 % | TEMPERATURE: 96.6 F | WEIGHT: 275.6 LBS | DIASTOLIC BLOOD PRESSURE: 76 MMHG | SYSTOLIC BLOOD PRESSURE: 110 MMHG | HEART RATE: 57 BPM

## 2021-04-01 DIAGNOSIS — G89.29 CHRONIC LOW BACK PAIN WITHOUT SCIATICA, UNSPECIFIED BACK PAIN LATERALITY: ICD-10-CM

## 2021-04-01 DIAGNOSIS — M54.50 CHRONIC LOW BACK PAIN WITHOUT SCIATICA, UNSPECIFIED BACK PAIN LATERALITY: ICD-10-CM

## 2021-04-01 DIAGNOSIS — E66.01 OBESITY, MORBID (HCC): ICD-10-CM

## 2021-04-01 DIAGNOSIS — M25.552 PAIN OF BOTH HIP JOINTS: ICD-10-CM

## 2021-04-01 DIAGNOSIS — E78.49 OTHER HYPERLIPIDEMIA: ICD-10-CM

## 2021-04-01 DIAGNOSIS — M25.551 PAIN OF BOTH HIP JOINTS: ICD-10-CM

## 2021-04-01 DIAGNOSIS — I10 ESSENTIAL HYPERTENSION: Primary | ICD-10-CM

## 2021-04-01 DIAGNOSIS — F33.1 MODERATE EPISODE OF RECURRENT MAJOR DEPRESSIVE DISORDER (HCC): Chronic | ICD-10-CM

## 2021-04-01 DIAGNOSIS — K75.3 GRANULOMATOUS HEPATITIS: ICD-10-CM

## 2021-04-01 PROBLEM — F33.9 EPISODE OF RECURRENT MAJOR DEPRESSIVE DISORDER (HCC): Chronic | Status: ACTIVE | Noted: 2021-04-01

## 2021-04-01 PROCEDURE — 99214 OFFICE O/P EST MOD 30 MIN: CPT | Performed by: FAMILY MEDICINE

## 2021-04-01 RX ORDER — LISINOPRIL 20 MG/1
20 TABLET ORAL NIGHTLY
Qty: 90 TABLET | Refills: 1 | Status: SHIPPED | OUTPATIENT
Start: 2021-04-01 | End: 2021-09-29 | Stop reason: SDUPTHER

## 2021-04-01 RX ORDER — TRAMADOL HYDROCHLORIDE 50 MG/1
50 TABLET ORAL EVERY 6 HOURS PRN
Qty: 60 TABLET | Refills: 0 | Status: SHIPPED | OUTPATIENT
Start: 2021-04-01 | End: 2021-08-03 | Stop reason: SDUPTHER

## 2021-04-01 RX ORDER — NAPROXEN 500 MG/1
500 TABLET ORAL 2 TIMES DAILY
Qty: 180 TABLET | Refills: 1 | Status: SHIPPED | OUTPATIENT
Start: 2021-04-01 | End: 2021-09-29 | Stop reason: SDUPTHER

## 2021-04-01 RX ORDER — DESOGESTREL AND ETHINYL ESTRADIOL 21-5 (28)
1 KIT ORAL DAILY
Qty: 28 TABLET | Refills: 3 | Status: CANCELLED | OUTPATIENT
Start: 2021-04-01 | End: 2021-05-01

## 2021-04-01 ASSESSMENT — PATIENT HEALTH QUESTIONNAIRE - PHQ9
SUM OF ALL RESPONSES TO PHQ QUESTIONS 1-9: 1
1. LITTLE INTEREST OR PLEASURE IN DOING THINGS: 0
SUM OF ALL RESPONSES TO PHQ QUESTIONS 1-9: 1
SUM OF ALL RESPONSES TO PHQ QUESTIONS 1-9: 1

## 2021-04-01 ASSESSMENT — ENCOUNTER SYMPTOMS
SHORTNESS OF BREATH: 0
COUGH: 0

## 2021-04-01 NOTE — TELEPHONE ENCOUNTER
Question regarding Tramadol---is the reason she is taking this a new condition or is it a chronic conditon?

## 2021-04-01 NOTE — PROGRESS NOTES
2021     Jada Jessicas      Chief Complaint   Patient presents with    3 Month Follow-Up     Med agreement UTD    Stress     Patient states she is under a lot of stress, she is a traveling critical care nurse. JI Valentine is a 63 y.o. female who presents today with the followin. Essential hypertension    2. Chronic low back pain without sciatica, unspecified back pain laterality    3. Pain of both hip joints    4. Obesity, morbid ([facility]    5. Other hyperlipidemia    6. Granulomatous hepatitis    7. 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 Respiratory: Negative for cough and shortness of breath. Cardiovascular:        Hypertension hyperlipidemia under control on her current treatment and diet    History of SVT but currently asymptomatic   Gastrointestinal:        Has been diagnosed with granulomatous hepatitis  This appears to be in remission   Genitourinary:        She has been on birth control pills chronically  She is sexually active  She is now 63years old   Musculoskeletal:        Has arthritic pain  She is on naproxen she takes as needed tramadol but not very often   Psychiatric/Behavioral: Positive for dysphoric mood.         Has had issues with depression in the past  She currently is suffering from increased symptoms of depression she has no suicidal thoughts  And I talked her about going on antidepressant therapy she wanted ED for that at this time       PAST MEDICAL HISTORY  Past Medical History:   Diagnosis Date    Acid reflux     Anxiety     Arthritis     \"Hands And Hips\"    Bronchitis Last Episode 2-18    Depression     Environmental allergies     Granulomatous hepatitis Dx 5-96    Heart palpitations     Hyperlipidemia     Hypertension     Pneumonia Dx 4-17    Shortness of breath on exertion     Teeth missing     Upper And Lower    Wears glasses     [place] teeth extracted     4 [place] Teeth Extracted In Past       FAMILY HISTORY  Family History   Problem Relation Age of Onset    Allergy (Severe) Mother     Other Mother         Fibromyalgia    High Blood Pressure Mother     Diabetes Father         \"Pre Diabetic\"    Alcohol Abuse Father         \"Recovered Alcoholic\"    Substance Abuse Father         \"Recovered Alcoholic\"    Cancer Maternal Grandfather         prostate    Cancer Paternal Grandfather         liver    Hypertension Other         grandmother    Diabetes Other         grandmother       SOCIAL HISTORY  Social History     Socioeconomic History    Marital status:      Spouse name: None    Number of children: None    Years of education: None    Highest education level: None   Occupational History    None   Social Needs    Financial resource strain: None    Food insecurity     Worry: None     Inability: None    Transportation needs     Medical: None     Non-medical: None   Tobacco Use    Smoking status: Never Smoker    Smokeless tobacco: Never Used   Substance and Sexual Activity    Alcohol use: Yes     Comment: 1 - 2 mixed drinks a week    Drug use: No    Sexual activity: Yes     Partners: Male   Lifestyle    Physical activity     Days per week: None     Minutes per session: None    Stress: None   Relationships    Social connections     Talks on phone: None     Gets together: None     Attends [employer] service: None     Active member of club or organization: None     Attends meetings of clubs or organizations: None     Relationship status: None    Intimate partner violence     Fear of current or ex partner: None Emotionally abused: None     Physically abused: None     Forced sexual activity: None   Other Topics Concern    None   Social History Narrative    None        SURGICAL HISTORY  Past Surgical History:   Procedure Laterality Date     SECTION  10/16/1987    CHOLECYSTECTOMY, LAPAROSCOPIC  1996    ENDOSCOPY, COLON, DIAGNOSTIC  1996    SINUS ENDOSCOPY Bilateral 2018    SINUS ENDOSCOPY FUNCTIONAL SURGERY BILATERAL MAXILLARY ANSTROSTOMY WITH TISSUE REMOVAL performed by Jack Whitaker MD at [address] Bilateral 2018    SUBMUCOUS RESECTION TURBINOPLASTY  BILATERAL performed by Jack Whitaker MD at [address]      4 [place] Teeth Extracted In Past       CURRENT MEDICATIONS  Current Outpatient Medications   Medication Sig Dispense Refill    lisinopril (PRINIVIL;ZESTRIL) 20 MG tablet Take 1 tablet by mouth nightly 90 tablet 1    traMADol (ULTRAM) 50 MG tablet Take 1 tablet by mouth every 6 hours as needed for Pain for up to 30 days. 60 tablet 0    naproxen (NAPROSYN) 500 MG tablet Take 1 tablet by mouth 2 times daily 180 tablet 1    metoprolol tartrate (LOPRESSOR) 25 MG tablet Take 1 tablet by mouth 2 times daily 180 tablet 3    omeprazole (PRILOSEC) 20 MG delayed release capsule Take 1 capsule by mouth every morning (before breakfast) 90 capsule 1    GuaiFENesin (MUCINEX PO) Take 1,200 mg by mouth 2 times daily Over The Counter, \"I Take A Regular One In The Morning, I Take DM At Night\"      famotidine (PEPCID) 20 MG tablet Take 20 mg by mouth every morning Over The Counter      estradiol (ESTRACE VAGINAL) 0.1 MG/GM vaginal cream Place 1 g vaginally daily x1 week, we then placed 1 g vaginally 3 times per week  each dose at least by day. (Patient not taking: Reported on 2020) 1 Tube 3     No current facility-administered medications for this visit.         ALLERGIES  Allergies   Allergen Reactions    Ampicillin Anaphylaxis    Bactrim [Sulfamethoxazole-Trimethoprim] Anaphylaxis    Flagyl [Metronidazole]      \"Welts\"    Tape Divya Brass Tape]      \"Tears The Skin\"       PHYSICAL EXAM    /76 (Site: Left Upper Arm, Position: Sitting, Cuff Size: Large Adult)   Pulse 57   Temp 96.6 °F (35.9 °C) (Temporal)   Ht 5' 3\" (1.6 m)   Wt 275 lb 9.6 oz (125 kg)   SpO2 99%   BMI 48.82 kg/m²     Physical Exam  Vitals signs and nursing note reviewed. Constitutional:       Appearance: She is obese. Cardiovascular:      Rate and Rhythm: Normal rate. Pulmonary:      Effort: Pulmonary effort is normal.     PDMP reviewed  Immunizations reviewed  Most recent labs reviewed      ASSESSMENT and PLAN    Roosevelt Trammell was seen today for 3 month follow-up and stress. Diagnoses and all orders for this visit:    Essential hypertension  -     lisinopril (PRINIVIL;ZESTRIL) 20 MG tablet; Take 1 tablet by mouth nightly  -     Lipid Panel; Future    Chronic low back pain without sciatica, unspecified back pain laterality  -     traMADol (ULTRAM) 50 MG tablet; Take 1 tablet by mouth every 6 hours as needed for Pain for up to 30 days. Pain of both hip joints  -     naproxen (NAPROSYN) 500 MG tablet; Take 1 tablet by mouth 2 times daily    Obesity, morbid ([address]    Other hyperlipidemia  -     Comprehensive Metabolic Panel; Future    Granulomatous hepatitis    Moderate episode of recurrent major depressive disorder ([address]      She is to get labs drawn fasting  Discontinue the oral contraceptive      Return in about 3 months (around 7/1/2021). Electronically signed by Ignacia Flores MD on 4/1/2021    Please note that this chart was generated using dragon dictation software. Although every effort was made to ensure the accuracy of this automated transcription, some errors in transcription may have occurred.

## 2021-04-07 ENCOUNTER — TELEPHONE (OUTPATIENT)
Dept: FAMILY MEDICINE CLINIC | Age: 56
End: 2021-04-07

## 2021-07-01 ENCOUNTER — OFFICE VISIT (OUTPATIENT)
Dept: FAMILY MEDICINE CLINIC | Age: 56
End: 2021-07-01
Payer: COMMERCIAL

## 2021-07-01 VITALS
HEIGHT: 63 IN | SYSTOLIC BLOOD PRESSURE: 129 MMHG | RESPIRATION RATE: 16 BRPM | BODY MASS INDEX: 50.32 KG/M2 | WEIGHT: 284 LBS | DIASTOLIC BLOOD PRESSURE: 84 MMHG

## 2021-07-01 DIAGNOSIS — G62.9 NEUROPATHY: Primary | ICD-10-CM

## 2021-07-01 DIAGNOSIS — G89.29 CHRONIC LOW BACK PAIN WITHOUT SCIATICA, UNSPECIFIED BACK PAIN LATERALITY: ICD-10-CM

## 2021-07-01 DIAGNOSIS — I10 ESSENTIAL HYPERTENSION: ICD-10-CM

## 2021-07-01 DIAGNOSIS — E66.01 OBESITY, MORBID (HCC): ICD-10-CM

## 2021-07-01 DIAGNOSIS — K21.9 GASTROESOPHAGEAL REFLUX DISEASE, UNSPECIFIED WHETHER ESOPHAGITIS PRESENT: ICD-10-CM

## 2021-07-01 DIAGNOSIS — M54.50 CHRONIC LOW BACK PAIN WITHOUT SCIATICA, UNSPECIFIED BACK PAIN LATERALITY: ICD-10-CM

## 2021-07-01 DIAGNOSIS — G56.03 BILATERAL CARPAL TUNNEL SYNDROME: ICD-10-CM

## 2021-07-01 PROCEDURE — 99214 OFFICE O/P EST MOD 30 MIN: CPT | Performed by: STUDENT IN AN ORGANIZED HEALTH CARE EDUCATION/TRAINING PROGRAM

## 2021-07-01 RX ORDER — OMEPRAZOLE 20 MG/1
20 CAPSULE, DELAYED RELEASE ORAL
Qty: 90 CAPSULE | Refills: 1 | Status: SHIPPED | OUTPATIENT
Start: 2021-07-01 | End: 2021-09-29 | Stop reason: SDUPTHER

## 2021-07-01 NOTE — PROGRESS NOTES
7/12/2021    Asmita Fofana    Chief Complaint   Patient presents with    3 Month Follow-Up     Presenting for three month follow up visit.  Numbness     in bilateral arms with tingling. Chronic but worse recently. HPI  History was obtained from patient. Nusrat Mcbride is a 61 y.o. female with a PMHx as listed below who presents today for follow up on chronic conditions, acute on chronic  numbness and tingling in both arms, hands in particular thumbs, arms, bilateral. She feels like the numbness/tingling originates from her shoulders and goes down both arms, worse in hands. This has been chronic going on for years, Gradually worsening. Alfredo Carter Has never had this worked up. Denies decreased  strength     BP excellent today  Roughly 9 pound weight gain since last visit 4/2021    Patient ICU Nurse. 1. Neuropathy    2. Bilateral carpal tunnel syndrome    3. Gastroesophageal reflux disease    4. Essential hypertension    5. Obesity, morbid ([address]    6. Chronic low back pain without sciatica, unspecified back pain laterality             REVIEW OF SYMPTOMS    Review of Systems   Constitutional: Negative for chills and fatigue. HENT: Negative for congestion and sore throat. Respiratory: Negative for shortness of breath and wheezing. Cardiovascular: Negative for chest pain and palpitations. Gastrointestinal: Negative for abdominal pain and nausea. Genitourinary: Negative for frequency and urgency. Musculoskeletal: Negative for back pain, joint swelling, neck pain and neck stiffness. Neurological: Positive for numbness (b/l arms, hands). Negative for dizziness, weakness and light-headedness.        PAST MEDICAL HISTORY  Past Medical History:   Diagnosis Date    Acid reflux     Anxiety     Arthritis     \"Hands And Hips\"    Bronchitis Last Episode 2-18    Depression     Environmental allergies     Granulomatous hepatitis Dx 5-96    Heart palpitations     Hyperlipidemia     Hypertension     Pneumonia Dx 4-17    Shortness of breath on exertion     Teeth missing     Upper And Lower    Wears glasses     [place] teeth extracted     4 [place] Teeth Extracted In Past       FAMILY HISTORY  Family History   Problem Relation Age of Onset    Allergy (Severe) Mother     Other Mother         Fibromyalgia    High Blood Pressure Mother     Diabetes Father         \"Pre Diabetic\"    Alcohol Abuse Father         \"Recovered Alcoholic\"    Substance Abuse Father         \"Recovered Alcoholic\"   Nohelia Curl Cancer Maternal Grandfather         prostate    Cancer Paternal Grandfather         liver    Hypertension Other         grandmother    Diabetes Other         grandmother       SOCIAL HISTORY  Social History     Socioeconomic History    Marital status:      Spouse name: None    Number of children: None    Years of education: None    Highest education level: None   Occupational History    None   Tobacco Use    Smoking status: Never Smoker    Smokeless tobacco: Never Used   Vaping Use    Vaping Use: Never used   Substance and Sexual Activity    Alcohol use: Yes     Comment: 1 - 2 mixed drinks a week    Drug use: No    Sexual activity: Yes     Partners: Male   Other Topics Concern    None   Social History Narrative    None     Social Determinants of Health     Financial Resource Strain:     Difficulty of Paying Living Expenses:    Food Insecurity:     Worried About Running Out of Food in the Last Year:     Ran Out of Food in the Last Year:    Transportation Needs:     Lack of Transportation (Medical):      Lack of Transportation (Non-Medical):    Physical Activity:     Days of Exercise per Week:     Minutes of Exercise per Session:    Stress:     Feeling of Stress :    Social Connections:     Frequency of Communication with Friends and Family:     Frequency of Social Gatherings with Friends and Family:     Attends Religion Services:     Active Member of Clubs or Organizations:     Attends Club or Organization Meetings:     Marital Status:    Intimate Partner Violence:     Fear of Current or Ex-Partner:     Emotionally Abused:     Physically Abused:     Sexually Abused:         SURGICAL HISTORY  Past Surgical History:   Procedure Laterality Date     SECTION  10/16/1987    CHOLECYSTECTOMY, LAPAROSCOPIC  1996    ENDOSCOPY, COLON, DIAGNOSTIC  1996    SINUS ENDOSCOPY Bilateral 2018    SINUS ENDOSCOPY FUNCTIONAL SURGERY BILATERAL MAXILLARY ANSTROSTOMY WITH TISSUE REMOVAL performed by Beto Earl MD [address] Bilateral 2018    SUBMUCOUS RESECTION TURBINOPLASTY  BILATERAL performed by Beto Earl MD at [address] EXTRACTION      4 [place] Teeth Extracted In Past                 CURRENT MEDICATIONS  Current Outpatient Medications   Medication Sig Dispense Refill    omeprazole (PRILOSEC) 20 MG delayed release capsule Take 1 capsule by mouth every morning (before breakfast) 90 capsule 1    lisinopril (PRINIVIL;ZESTRIL) 20 MG tablet Take 1 tablet by mouth nightly 90 tablet 1    naproxen (NAPROSYN) 500 MG tablet Take 1 tablet by mouth 2 times daily 180 tablet 1    metoprolol tartrate (LOPRESSOR) 25 MG tablet Take 1 tablet by mouth 2 times daily 180 tablet 3    GuaiFENesin (MUCINEX PO) Take 1,200 mg by mouth 2 times daily Over The Counter, \"I Take A Regular One In The Morning, I Take DM At Night\"       No current facility-administered medications for this visit. ALLERGIES  Allergies   Allergen Reactions    Ampicillin Anaphylaxis    Bactrim [Sulfamethoxazole-Trimethoprim] Anaphylaxis    Flagyl [Metronidazole]      \"Welts\"    Tape Marella Cuong Tape]      \"Tears The Skin\"       PHYSICAL EXAM    /84   Resp 16   Ht 5' 3\" (1.6 m)   Wt 284 lb (128.8 kg)   BMI 50.31 kg/m²     Physical Exam  Constitutional:       Appearance: Normal appearance. HENT:      Head: Normocephalic and atraumatic.    Eyes:      Extraocular Movements: Extraocular movements intact. Pupils: Pupils are equal, round, and reactive to light. Cardiovascular:      Rate and Rhythm: Normal rate and regular rhythm. Pulses: Normal pulses. Heart sounds: No murmur heard. No friction rub. No gallop. Pulmonary:      Effort: Pulmonary effort is normal.      Breath sounds: Normal breath sounds. Musculoskeletal:      Comments: 5/5 m. Strength UE/LE     Skin:     General: Skin is warm and dry. Neurological:      General: No focal deficit present. Mental Status: She is alert. Comments: Negative spurling, tinel, phalen  Bicep reflex 2/4 b/l    Psychiatric:         Mood and Affect: Mood normal.         Behavior: Behavior normal.         ASSESSMENT & PLAN    1. Neuropathy  -f/u cervical xr, emg. Discussed to wear wrist brace at night and with use.   -f/u labs   -symptoms worse in hands but per patient originating upper arm  - EMG; Future  - XR CERVICAL SPINE (2-3 VIEWS); Future    2. Bilateral carpal tunnel syndrome  -as above  - omeprazole (PRILOSEC) 20 MG delayed release capsule; Take 1 capsule by mouth every morning (before breakfast)  Dispense: 90 capsule; Refill: 1  - Electrophoresis Protein, Serum without Reflex to Immunofixation; Future  - Vitamin B12; Future  - Sedimentation Rate; Future  - SHEFALI; Future  - TSH without Reflex; Future  - T4, Free; Future  - Lipid, Fasting; Future  - Comprehensive Metabolic Panel; Future  - EMG; Future  - XR CERVICAL SPINE (2-3 VIEWS); Future    3. Gastroesophageal reflux disease  -stable  - omeprazole (PRILOSEC) 20 MG delayed release capsule; Take 1 capsule by mouth every morning (before breakfast)  Dispense: 90 capsule; Refill: 1    4. Essential hypertension  -well controlled    5. Obesity, morbid ([address]  -counseled on diet/lifestyle    6. Chronic low back pain without sciatica, unspecified back pain laterality  -stable      Return in about 3 months (around 10/1/2021).          Electronically signed by Augusto Ring, DO on 7/12/2021

## 2021-07-12 ASSESSMENT — ENCOUNTER SYMPTOMS
BACK PAIN: 0
WHEEZING: 0
SHORTNESS OF BREATH: 0
SORE THROAT: 0
NAUSEA: 0
ABDOMINAL PAIN: 0

## 2021-08-02 ENCOUNTER — PATIENT MESSAGE (OUTPATIENT)
Dept: FAMILY MEDICINE CLINIC | Age: 56
End: 2021-08-02

## 2021-08-02 DIAGNOSIS — M54.50 CHRONIC LOW BACK PAIN WITHOUT SCIATICA, UNSPECIFIED BACK PAIN LATERALITY: ICD-10-CM

## 2021-08-02 DIAGNOSIS — G89.29 CHRONIC LOW BACK PAIN WITHOUT SCIATICA, UNSPECIFIED BACK PAIN LATERALITY: ICD-10-CM

## 2021-08-03 RX ORDER — TRAMADOL HYDROCHLORIDE 50 MG/1
50 TABLET ORAL EVERY 6 HOURS PRN
Qty: 60 TABLET | Refills: 0 | Status: SHIPPED | OUTPATIENT
Start: 2021-08-03 | End: 2021-11-03 | Stop reason: SDUPTHER

## 2021-08-03 NOTE — TELEPHONE ENCOUNTER
From: Patricia Reis  To: Lionel Nunes DO  Sent: 8/2/2021 5:33 PM EDT  Subject: Prescription Question    Need tramadol refilled please and thank you.

## 2021-08-05 ENCOUNTER — TELEPHONE (OUTPATIENT)
Dept: FAMILY MEDICINE CLINIC | Age: 56
End: 2021-08-05

## 2021-08-05 DIAGNOSIS — G56.03 BILATERAL CARPAL TUNNEL SYNDROME: Primary | ICD-10-CM

## 2021-08-05 DIAGNOSIS — G62.9 NEUROPATHY: ICD-10-CM

## 2021-08-05 NOTE — TELEPHONE ENCOUNTER
To Dr. Anastasiya Sheehan-    Please update the EMG order because patient is scheduled for this imaging on Sept 14th and the order expires on 8/1/21.

## 2021-09-13 ENCOUNTER — HOSPITAL ENCOUNTER (OUTPATIENT)
Age: 56
Discharge: HOME | End: 2021-09-13
Payer: COMMERCIAL

## 2021-09-13 ENCOUNTER — HOSPITAL ENCOUNTER (OUTPATIENT)
Dept: GENERAL RADIOLOGY | Age: 56
Discharge: HOME | End: 2021-09-13
Payer: COMMERCIAL

## 2021-09-13 ENCOUNTER — HOSPITAL ENCOUNTER (OUTPATIENT)
Dept: NEUROLOGY | Age: 56
Discharge: HOME | End: 2021-09-13
Payer: COMMERCIAL

## 2021-09-13 DIAGNOSIS — G56.03 BILATERAL CARPAL TUNNEL SYNDROME: ICD-10-CM

## 2021-09-13 DIAGNOSIS — G62.9 NEUROPATHY: ICD-10-CM

## 2021-09-13 LAB
ALBUMIN SERPL-MCNC: 4.1 GM/DL (ref 3.4–5)
ALP BLD-CCNC: 137 IU/L (ref 40–128)
ALT SERPL-CCNC: 13 U/L (ref 10–40)
ANION GAP SERPL CALCULATED.3IONS-SCNC: 14 MMOL/L (ref 4–16)
AST SERPL-CCNC: 20 IU/L (ref 15–37)
BILIRUB SERPL-MCNC: 0.6 MG/DL (ref 0–1)
BUN BLDV-MCNC: 16 MG/DL (ref 6–23)
CALCIUM SERPL-MCNC: 9.7 MG/DL (ref 8.3–10.6)
CHLORIDE BLD-SCNC: 104 MMOL/L (ref 99–110)
CHOLESTEROL: 255 MG/DL
CO2: 24 MMOL/L (ref 21–32)
CREAT SERPL-MCNC: 0.7 MG/DL (ref 0.6–1.1)
ERYTHROCYTE SEDIMENTATION RATE: 44 MM/HR (ref 0–30)
GFR AFRICAN AMERICAN: >60 ML/MIN/1.73M2
GFR NON-AFRICAN AMERICAN: >60 ML/MIN/1.73M2
GLUCOSE BLD-MCNC: 95 MG/DL (ref 70–99)
HDLC SERPL-MCNC: 59 MG/DL
LDL CHOLESTEROL DIRECT: 168 MG/DL
POTASSIUM SERPL-SCNC: 4.4 MMOL/L (ref 3.5–5.1)
SODIUM BLD-SCNC: 142 MMOL/L (ref 135–145)
T4 FREE: 1.2 NG/DL (ref 0.9–1.8)
TOTAL PROTEIN: 6.9 GM/DL (ref 6.4–8.2)
TRIGL SERPL-MCNC: 150 MG/DL
TSH HIGH SENSITIVITY: 0.51 UIU/ML (ref 0.27–4.2)
VITAMIN B-12: 581.9 PG/ML (ref 211–911)

## 2021-09-13 PROCEDURE — 86038 ANTINUCLEAR ANTIBODIES: CPT

## 2021-09-13 PROCEDURE — 95886 MUSC TEST DONE W/N TEST COMP: CPT | Performed by: PHYSICAL MEDICINE & REHABILITATION

## 2021-09-13 PROCEDURE — 82607 VITAMIN B-12: CPT

## 2021-09-13 PROCEDURE — 84165 PROTEIN E-PHORESIS SERUM: CPT

## 2021-09-13 PROCEDURE — 80053 COMPREHEN METABOLIC PANEL: CPT

## 2021-09-13 PROCEDURE — 85652 RBC SED RATE AUTOMATED: CPT

## 2021-09-13 PROCEDURE — 95886 MUSC TEST DONE W/N TEST COMP: CPT

## 2021-09-13 PROCEDURE — 83721 ASSAY OF BLOOD LIPOPROTEIN: CPT

## 2021-09-13 PROCEDURE — 84443 ASSAY THYROID STIM HORMONE: CPT

## 2021-09-13 PROCEDURE — 95911 NRV CNDJ TEST 9-10 STUDIES: CPT | Performed by: PHYSICAL MEDICINE & REHABILITATION

## 2021-09-13 PROCEDURE — 36415 COLL VENOUS BLD VENIPUNCTURE: CPT

## 2021-09-13 PROCEDURE — 95911 NRV CNDJ TEST 9-10 STUDIES: CPT

## 2021-09-13 PROCEDURE — 80061 LIPID PANEL: CPT

## 2021-09-13 PROCEDURE — 72050 X-RAY EXAM NECK SPINE 4/5VWS: CPT

## 2021-09-13 PROCEDURE — 84439 ASSAY OF FREE THYROXINE: CPT

## 2021-09-13 NOTE — PROCEDURES
Risks and benefits of study discussed. Specific and common risks of pain and bleeding, as well as uncommon side effects of infection, hematoma, vasovagal episodes. Patient agreeable to testing and consents to such. Clinical: Several years of periodic hand numbness, with more persistent symptoms over the past 3 months bilateral.    Motor NCS:  Median amplitudes normal bilateral; latencies moderate to severely prolonged and conduction velocities are low normal.  Ulnar amplitudes, latencies, velocities normal bilateral    Sensory NCS:  Median responses absent bilateral  Bilateral ulnar and radial amplitudes and latencies normal    Needle EMG: Normal findings throughout both upper limbs including opponens sampling areas. 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 Clinical symptoms of carpal tunnel syndrome. #2  No evidence to suggest cervical radiculopathy, brachial plexopathy, generalized neuropathy affecting the upper limbs. *Due to the severity of median neuropathies described above, referral to an upper extremity surgical specialist would be strongly recommended.

## 2021-09-14 NOTE — RESULT ENCOUNTER NOTE
Shireen Talley, your recent labs show some elevation in cholesterol but not at the point where it is recommended to start medications.  You have some mild increase in inflammatory markers we will continue to monitor/trend

## 2021-09-14 NOTE — RESULT ENCOUNTER NOTE
Please discuss with patient EMG results showing signs of carpal tunnel. Would recommend referral to Orthopedic Surgeon for further evaluation if she wants further treatment for this vs. Just continue conservative management. Let me know if patient patient has a preference for surgeon if she chooses.

## 2021-09-14 NOTE — RESULT ENCOUNTER NOTE
Brian Moses, your recent XR showed some some chronic arthritis otherwise normal. Sent to patients mychart upcoming appt soon

## 2021-09-15 LAB — ANTI-NUCLEAR ANTIBODY (ANA): NORMAL

## 2021-09-17 LAB
ALBUMIN ELP: 3.5 GM/DL (ref 3.2–5.6)
ALPHA-1-GLOBULIN: 0.2 GM/DL (ref 0.1–0.4)
ALPHA-2-GLOBULIN: 0.8 GM/DL (ref 0.4–1.2)
BETA GLOBULIN: 1.1 GM/DL (ref 0.5–1.3)
GAMMA GLOBULIN: 1.2 GM/DL (ref 0.5–1.6)
SPEP INTERPRETATION: NORMAL
TOTAL PROTEIN: 6.9 GM/DL (ref 6.4–8.2)

## 2021-09-29 ENCOUNTER — OFFICE VISIT (OUTPATIENT)
Dept: FAMILY MEDICINE CLINIC | Age: 56
End: 2021-09-29
Payer: COMMERCIAL

## 2021-09-29 VITALS
SYSTOLIC BLOOD PRESSURE: 128 MMHG | OXYGEN SATURATION: 95 % | WEIGHT: 291 LBS | HEART RATE: 60 BPM | DIASTOLIC BLOOD PRESSURE: 82 MMHG | RESPIRATION RATE: 16 BRPM | BODY MASS INDEX: 51.56 KG/M2 | HEIGHT: 63 IN

## 2021-09-29 DIAGNOSIS — M76.61 RIGHT ACHILLES BURSITIS: ICD-10-CM

## 2021-09-29 DIAGNOSIS — G56.03 BILATERAL CARPAL TUNNEL SYNDROME: ICD-10-CM

## 2021-09-29 DIAGNOSIS — Z23 NEED FOR VACCINATION: Primary | ICD-10-CM

## 2021-09-29 DIAGNOSIS — M25.551 PAIN OF BOTH HIP JOINTS: ICD-10-CM

## 2021-09-29 DIAGNOSIS — I10 ESSENTIAL HYPERTENSION: ICD-10-CM

## 2021-09-29 DIAGNOSIS — M25.552 PAIN OF BOTH HIP JOINTS: ICD-10-CM

## 2021-09-29 DIAGNOSIS — K21.9 GASTROESOPHAGEAL REFLUX DISEASE, UNSPECIFIED WHETHER ESOPHAGITIS PRESENT: ICD-10-CM

## 2021-09-29 PROCEDURE — 99214 OFFICE O/P EST MOD 30 MIN: CPT | Performed by: STUDENT IN AN ORGANIZED HEALTH CARE EDUCATION/TRAINING PROGRAM

## 2021-09-29 PROCEDURE — 90674 CCIIV4 VAC NO PRSV 0.5 ML IM: CPT | Performed by: STUDENT IN AN ORGANIZED HEALTH CARE EDUCATION/TRAINING PROGRAM

## 2021-09-29 PROCEDURE — 90471 IMMUNIZATION ADMIN: CPT | Performed by: STUDENT IN AN ORGANIZED HEALTH CARE EDUCATION/TRAINING PROGRAM

## 2021-09-29 RX ORDER — NAPROXEN 500 MG/1
500 TABLET ORAL 2 TIMES DAILY
Qty: 180 TABLET | Refills: 1 | Status: SHIPPED | OUTPATIENT
Start: 2021-09-29 | End: 2021-12-21 | Stop reason: SDUPTHER

## 2021-09-29 RX ORDER — LISINOPRIL 20 MG/1
20 TABLET ORAL NIGHTLY
Qty: 90 TABLET | Refills: 1 | Status: SHIPPED | OUTPATIENT
Start: 2021-09-29 | End: 2021-12-21 | Stop reason: SDUPTHER

## 2021-09-29 RX ORDER — GABAPENTIN 300 MG/1
300 CAPSULE ORAL NIGHTLY
Qty: 30 CAPSULE | Refills: 5 | Status: SHIPPED | OUTPATIENT
Start: 2021-09-29 | End: 2021-12-21 | Stop reason: SDUPTHER

## 2021-09-29 RX ORDER — OMEPRAZOLE 20 MG/1
20 CAPSULE, DELAYED RELEASE ORAL
Qty: 90 CAPSULE | Refills: 1 | Status: SHIPPED | OUTPATIENT
Start: 2021-09-29 | End: 2021-12-21 | Stop reason: SDUPTHER

## 2021-09-29 ASSESSMENT — ENCOUNTER SYMPTOMS
ABDOMINAL PAIN: 0
WHEEZING: 0
SHORTNESS OF BREATH: 0
NAUSEA: 0
SORE THROAT: 0

## 2021-09-29 NOTE — PROGRESS NOTES
10/5/2021    Sangeetha Ibanez    Chief Complaint   Patient presents with    3 Month Follow-Up     Thrree month follow up visit.  Discuss Labs    Other     Discuss imaging.  Flu Vaccine     flu shot given. HPI  History was obtained from patient. Ashley is a 61 y.o. female with a PMHx as listed below who presents today for 3-month follow-up. No acute complaints. Compliant with medications. Patient has significant carpal tunnel as seen on EMG. She still notices pain this is affecting her both at home and at work. 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 Hx. Of granuloma     Discussed labs fairly normal.     RN works nights    1. Need for vaccination    2. Bilateral carpal tunnel syndrome    3. Essential hypertension    4. Gastroesophageal reflux disease, unspecified whether esophagitis present    5. Pain of both hip joints    6. Right Achilles bursitis             REVIEW OF SYMPTOMS    Review of Systems   Constitutional: Negative for chills and fatigue. HENT: Negative for congestion and sore throat. Respiratory: Negative for shortness of breath and wheezing. Cardiovascular: Negative for chest pain and palpitations. Gastrointestinal: Negative for abdominal pain and nausea. Genitourinary: Negative for frequency and urgency. Neurological: Negative for light-headedness.        PAST MEDICAL HISTORY  Past Medical History:   Diagnosis Date    Acid reflux     Anxiety     Arthritis     \"Hands And Hips\"    Bronchitis Last Episode 2-18    Depression     Environmental allergies     Granulomatous hepatitis Dx 5-96    Heart palpitations     Hyperlipidemia     Hypertension     Pneumonia Dx 4-17    Shortness of breath on exertion     Teeth missing     Upper And Lower    Wears glasses     [place] teeth extracted     4 [place] Teeth Extracted In Past       FAMILY HISTORY  Family History   Problem Relation Age of Onset    Allergy (Severe) Mother     Other Mother         Fibromyalgia    High Blood Pressure Mother     Diabetes Father         \"Pre Diabetic\"    Alcohol Abuse Father         \"Recovered Alcoholic\"    Substance Abuse Father         \"Recovered Alcoholic\"    Cancer Maternal Grandfather         prostate    Cancer Paternal Grandfather         liver    Hypertension Other         grandmother    Diabetes Other         grandmother       SOCIAL HISTORY  Social History     Socioeconomic History    Marital status:      Spouse name: None    Number of children: None    Years of education: None    Highest education level: None   Occupational History    None   Tobacco Use    Smoking status: Never Smoker    Smokeless tobacco: Never Used   Vaping Use    Vaping Use: Never used   Substance and Sexual Activity    Alcohol use: Yes     Comment: 1 - 2 mixed drinks a week    Drug use: No    Sexual activity: Yes     Partners: Male   Other Topics Concern    None   Social History Narrative    None     Social Determinants of Health     Financial Resource Strain:     Difficulty of Paying Living Expenses:    Food Insecurity:     Worried About Running Out of Food in the Last Year:     Ran Out of Food in the Last Year:    Transportation Needs:     Lack of Transportation (Medical):      Lack of Transportation (Non-Medical):    Physical Activity:     Days of Exercise per Week:     Minutes of Exercise per Session:    Stress:     Feeling of Stress :    Social Connections:     Frequency of Communication with Friends and Family:     Frequency of Social Gatherings with Friends and Family:     Attends Confucianist Services:     Active Member of Clubs or Organizations:     Attends Club or Organization Meetings:     Marital Status:    Intimate Partner Violence:     Fear of Current or Ex-Partner:     Emotionally Abused:     Physically Abused:     Sexually Abused:         SURGICAL HISTORY  Past Surgical History:   Procedure Laterality Date     SECTION  10/16/1987    CHOLECYSTECTOMY, LAPAROSCOPIC  1996    ENDOSCOPY, COLON, DIAGNOSTIC  1996    SINUS ENDOSCOPY Bilateral 2018    SINUS ENDOSCOPY FUNCTIONAL SURGERY BILATERAL MAXILLARY ANSTROSTOMY WITH TISSUE REMOVAL performed by Gauri Villarreal MD at [address] Bilateral 2018    SUBMUCOUS RESECTION TURBINOPLASTY  BILATERAL performed by Gauri Villarreal MD at [address] EXTRACTION      4 [place] Teeth Extracted In Past                 CURRENT MEDICATIONS  Current Outpatient Medications   Medication Sig Dispense Refill    lisinopril (PRINIVIL;ZESTRIL) 20 MG tablet Take 1 tablet by mouth nightly 90 tablet 1    omeprazole (PRILOSEC) 20 MG delayed release capsule Take 1 capsule by mouth every morning (before breakfast) 90 capsule 1    naproxen (NAPROSYN) 500 MG tablet Take 1 tablet by mouth 2 times daily 180 tablet 1    gabapentin (NEURONTIN) 300 MG capsule Take 1 capsule by mouth nightly for 30 days. Intended supply: 30 days 30 capsule 5    metoprolol tartrate (LOPRESSOR) 25 MG tablet Take 1 tablet by mouth 2 times daily 180 tablet 3    GuaiFENesin (MUCINEX PO) Take 1,200 mg by mouth 2 times daily Over The Counter, \"I Take A Regular One In The Morning, I Take DM At Night\"       No current facility-administered medications for this visit. ALLERGIES  Allergies   Allergen Reactions    Ampicillin Anaphylaxis    Bactrim [Sulfamethoxazole-Trimethoprim] Anaphylaxis    Flagyl [Metronidazole]      \"Welts\"    Tape Angi Bas[place] Tape]      \"Tears The Skin\"       PHYSICAL EXAM    /82   Pulse 60   Resp 16   Ht 5' 3\" (1.6 m)   Wt 291 lb (132 kg)   SpO2 95%   BMI 51.55 kg/m²     Physical Exam  Constitutional:       Appearance: Normal appearance. HENT:      Head: Normocephalic and atraumatic. Eyes:      Extraocular Movements: Extraocular movements intact. Pupils: Pupils are equal, round, and reactive to light. Cardiovascular:      Rate and Rhythm: Normal rate and regular rhythm. Pulses: Normal pulses. Heart sounds: No murmur heard. No friction rub. No gallop. Pulmonary:      Effort: Pulmonary effort is normal.      Breath sounds: Normal breath sounds. Musculoskeletal:      Cervical back: Neck supple. Comments: wearing wrist brace mild relief  Swelling right achilles. TTP chronic injury  Full range of motion upper right and lower extremities  Muscle strength 5 out of 5 upper extremity and lower extremities     Skin:     General: Skin is warm and dry. Neurological:      General: No focal deficit present. Mental Status: She is alert. Psychiatric:         Mood and Affect: Mood normal.         Behavior: Behavior normal.         ASSESSMENT & PLAN    1. Bilateral carpal tunnel syndrome  -Not improving with conservative measures  -We will start gabapentin as needed for pain   -Referral to orthopedics for further evaluation  - omeprazole (PRILOSEC) 20 MG delayed release capsule; Take 1 capsule by mouth every morning (before breakfast)  Dispense: 90 capsule; Refill: 1  - gabapentin (NEURONTIN) 300 MG capsule; Take 1 capsule by mouth nightly for 30 days. Intended supply: 30 days  Dispense: 30 capsule; [facility]    2. Essential hypertension  Blood pressure stable on current regimen  - lisinopril (PRINIVIL;ZESTRIL) 20 MG tablet; Take 1 tablet by mouth nightly  Dispense: 90 tablet; Refill: 1    3. Gastroesophageal reflux disease, unspecified whether esophagitis present  Well-controlled  - omeprazole (PRILOSEC) 20 MG delayed release capsule; Take 1 capsule by mouth every morning (before breakfast)  Dispense: 90 capsule; Refill: 1    4. Pain of both hip joints  Stable  - naproxen (NAPROSYN) 500 MG tablet; Take 1 tablet by mouth 2 times daily  Dispense: 180 tablet;  Refill: AlJohn Maynard, , Orthopedic Surgery [place]    5. Need for vaccination  - INFLUENZA, MDCK QUADV, 2 YRS AND OLDER, IM, PF, PREFILL SYR OR SDV, 0.5ML (FLUCELVAX QUADV, PF)    6. Right Achilles bursitis  -Chronic injury offered imaging for further evaluation patient will hold until Ortho consult. Return in about 3 months (around 12/29/2021).          Electronically signed by Harry Benites DO on 10/5/2021

## 2021-10-07 ENCOUNTER — OFFICE VISIT (OUTPATIENT)
Dept: ORTHOPEDIC SURGERY | Age: 56
End: 2021-10-07
Payer: COMMERCIAL

## 2021-10-07 ENCOUNTER — TELEPHONE (OUTPATIENT)
Dept: ORTHOPEDIC SURGERY | Age: 56
End: 2021-10-07

## 2021-10-07 VITALS — WEIGHT: 291 LBS | HEIGHT: 63 IN | BODY MASS INDEX: 51.56 KG/M2 | RESPIRATION RATE: 16 BRPM

## 2021-10-07 DIAGNOSIS — G56.03 BILATERAL CARPAL TUNNEL SYNDROME: Primary | ICD-10-CM

## 2021-10-07 PROCEDURE — 99204 OFFICE O/P NEW MOD 45 MIN: CPT | Performed by: STUDENT IN AN ORGANIZED HEALTH CARE EDUCATION/TRAINING PROGRAM

## 2021-10-07 ASSESSMENT — ENCOUNTER SYMPTOMS
FACIAL SWELLING: 0
EYE PAIN: 0
COUGH: 0
NAUSEA: 0
COLOR CHANGE: 0
VOMITING: 0
EYE REDNESS: 0
WHEEZING: 0
STRIDOR: 0
BACK PAIN: 0
ABDOMINAL PAIN: 0
SHORTNESS OF BREATH: 0
PHOTOPHOBIA: 0

## 2021-10-07 NOTE — PATIENT INSTRUCTIONS
[employer]. Will reach out to you with a date for November for Carpal Tunnel Surgery for the left hand  We will schedule surgery at [facility].  If you have any questions regarding your surgery please call our office and ask to speak with [employer]. [phone]

## 2021-10-07 NOTE — PROGRESS NOTES
10/7/2021   Chief Complaint   Patient presents with    Wrist Pain     B/L Wrist - EMG completed on 2021        History of Present Illness:                             Leanna Chavarria is a 61 y.o. 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 Patient has an EMG already performed that does confirm bilateral carpal tunnel syndrome. Patient states that symptomatically the left is worse than the right. Patient states that their symptoms have been occurring for approximately 1 year. They state it wakes them from sleep at night. The pain is currently rated moderate. Patient states that they have numbness and tingling in the median nerve distribution of the bilateral hands . They not any ulnar nerve symptoms. They have had no treatment at this point including splinting, cortisone injection, anti-inflammatories. Related history: negative for prior surgery, trauma, arthritis or disorders. Is affecting ADLs. Pain is 7/10 at it's worst.      Medical History  Patient's medications, allergies, past medical, surgical, social and family histories were reviewed and updated as appropriate.     Past Medical History:   Diagnosis Date    Acid reflux     Anxiety     Arthritis     \"Hands And Hips\"    Bronchitis Last Episode 2-18    Depression     Environmental allergies     Granulomatous hepatitis Dx 5-96    Heart palpitations     Hyperlipidemia     Hypertension     Pneumonia Dx 4-17    Shortness of breath on exertion     Teeth missing     Upper And Lower    Wears glasses     [place] teeth extracted     4 [place] Teeth Extracted In Past     Past Surgical History:   Procedure Laterality Date     SECTION  10/16/1987    CHOLECYSTECTOMY, LAPAROSCOPIC  1996    ENDOSCOPY, COLON, DIAGNOSTIC  1996    SINUS ENDOSCOPY Bilateral 2018    SINUS ENDOSCOPY FUNCTIONAL SURGERY BILATERAL MAXILLARY ANSTROSTOMY WITH TISSUE REMOVAL performed by Yo Schuler MD at [address] Bilateral 12/13/2018    SUBMUCOUS RESECTION TURBINOPLASTY  BILATERAL performed by Yo Schuler MD at [address] EXTRACTION      4 [place] Teeth Extracted In Past     Family History   Problem Relation Age of Onset    Allergy (Severe) Mother     Other Mother         Fibromyalgia    High Blood Pressure Mother     Diabetes Father         \"Pre Diabetic\"    Alcohol Abuse Father         \"Recovered Alcoholic\"   [facility] Substance Abuse Father         \"Recovered Alcoholic\"   [facility] Cancer Maternal Grandfather         prostate    Cancer Paternal Grandfather         liver    Hypertension Other         grandmother    Diabetes Other         grandmother     Social History     Socioeconomic History    Marital status:      Spouse name: None    Number of children: None    Years of education: None    Highest education level: None   Occupational History    None   Tobacco Use    Smoking status: Never Smoker    Smokeless tobacco: Never Used   Vaping Use    Vaping Use: Never used   Substance and Sexual Activity    Alcohol use: Yes     Comment: 1 - 2 mixed drinks a week    Drug use: No    Sexual activity: Yes     Partners: Male   Other Topics Concern    None   Social History Narrative    None     Social Determinants of Health     Financial Resource Strain:     Difficulty of Paying Living Expenses:    Food Insecurity:     Worried About Running Out of Food in the Last Year:     Ran Out of Food in the Last Year:    Transportation Needs:     Lack of Transportation (Medical):      Lack of Transportation (Non-Medical):    Physical Activity:     Days of Exercise per Week:     Minutes of Exercise per Session:    Stress:     Feeling of Stress :    Social Connections:     Frequency of Communication with Friends and Family:     Frequency of Social Gatherings with Friends and Family:     Attends Evangelical Services: swelling, neck pain and neck stiffness. Skin: Negative for color change, pallor, rash and wound. Neurological: Positive for numbness. Negative for dizziness, facial asymmetry and weakness. Examination:  General Exam:  Vitals: Resp 16   Ht 5' 3\" (1.6 m)   Wt 291 lb (132 kg)   BMI 51.55 kg/m²    Physical Exam  Constitutional:       General: She is not in acute distress. Appearance: Normal appearance. She is obese. HENT:      Head: Normocephalic and atraumatic. Eyes:      General:         Right eye: No discharge. Left eye: No discharge. Extraocular Movements: Extraocular movements intact. Pulmonary:      Effort: Pulmonary effort is normal.      Breath sounds: No wheezing. Chest:      Chest wall: No tenderness. Musculoskeletal:         General: No swelling, tenderness, deformity or signs of injury. Right wrist: Normal.      Left wrist: Normal.      Right hand: No swelling, deformity, lacerations or bony tenderness. Normal range of motion. Normal strength. Decreased sensation of the median distribution. Normal capillary refill. Left hand: No swelling, deformity, lacerations or bony tenderness. Normal range of motion. Normal strength. Decreased sensation of the median distribution. Normal capillary refill. Cervical back: Normal range of motion. Skin:     General: Skin is warm and dry. Capillary Refill: Capillary refill takes less than 2 seconds. Neurological:      Mental Status: She is alert. Sensory: Sensory deficit present. Motor: No weakness.       Gait: Gait normal.      Deep Tendon Reflexes: Reflexes normal.   Psychiatric:         Mood and Affect: Mood normal.         Behavior: Behavior normal.        Bilateral upper extremity EXAM:    OBSERVATION / INSPECTION:     Swelling none     Deformity none    Discoloration none     Scars none     Atrophy none      TENDERNESS / CREPITUS (T / C):      1st Dorsal compartment -/-    FCR -/-    FCU -/-    Ulnar Styloid -/-    Radial Styloid -/-    Palm -/-    Metacarpals -/-    Thumb CMC -/-     Thumb MCP -/-    Lesser MCPs -/-    PIP -/-    DIP -/-      Range of motion: ('*' = with pain)     Right Elbow     AROM (PROM)     Extension 0 deg  (5 deg)     Flexion 145 deg (145 deg)        Pronation 90 deg  (90 deg)     Supination 80 deg  (80 deg)                Left Elbow     AROM (PROM)     Extension 0 deg  (5 deg)     Flexion 145 deg (145 deg)        Pronation 90 deg  (90 deg)     Supination 80 deg  (80 deg)        Right Wrist      Extension 80 deg (85 deg)     Flexion 80 deg (85 deg)        Ulnar Deviation  35 deg (40 deg)    Radial Deviation 35 deg (40 deg)             Left Wrist     AROM (PROM)     Extension 80 deg (85 deg)     Flexion 80 deg (85 deg)        Ulnar Deviation  35 deg (40 deg)    Radial Deviation 35 deg (40 deg)         WRIST EXAMINATION:    See above noted areas of tenderness. Finkelstein's Test Neg    TTP at 1st Dorsal Compartment neg    Tinel's Test - Carpal Tunnel positive bilaterally    Phalen's Test positive bilaterally    Median Nerve Compression Test positive bilaterally    Ulnar-sided Compression Test neg    Snuff box tenderness neg         STRENGTH: ('*' = with pain)     Elbow Flexion: 5/5    Elbow Extension: 5/5    Wrist Flexion: 5/5    Wrist Extension: 5/5    : 5/5    Intrinsics: 5/5    EPL (Extensor pollicis longus): 5/5    Pinch Mechanism: 5/5      EXTREMITY NEURO-VASCULAR EXAMINATION: Sensation grossly intact to light touch all dermatomal regions. DTR 2+ Biceps, Triceps, BR and Negative Saul's sign. Grossly intact motor function at Elbow, Wrist and Hand. Distal pulses radial and ulnar 2+, brisk cap refill, symmetric.         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  Clinical symptoms of carpal tunnel syndrome. #2  No evidence to suggest cervical radiculopathy, brachial plexopathy, generalized neuropathy affecting the upper limbs. 3 view of the right wrist and hand in a skeletally mature patient demonstrates no acute osseous or soft tissue abnormalities. Alignment is appropriate throughout. No sign of any fracture or soft tissue avulsion. No sign of any subluxation. No significant soft tissue swelling. 3 view of the left wrist and hand in a skeletally mature patient demonstrates no acute osseous or soft tissue abnormalities. Alignment is appropriate throughout. No sign of any fracture or soft tissue avulsion. No sign of any subluxation. No significant soft tissue swelling. Office Procedures:  No orders of the defined types were placed in this encounter. Assessment and Plan    A: Bilateral carpal tunnel syndrome. P:     I had a thorough discussion with the patient regarding her EMG findings as well as her physical exam findings. I explained that likely she has no significant weakness on exam although she does have significant altered sensation. Patient would like to proceed with surgery and chose to undergo left open carpal tunnel release first.  I explained that we will schedule her for the right open carpal tunnel release when she is into the postoperative period and felt to be well-healed on the left side. I did discuss with the patient the possibility of more conservative options including injections and bracing but due to the severity of her EMG findings I felt it was necessary to proceed with surgery at this time. I discussed surgical intervention in the form of:  1. Left open carpal tunnel release. I explained risks, benefits, possible complications of the procedure and answered all questions for the patient. I explained postoperative rehabilitation protocol and expectations with the patient today.   The patient understands and consents to the procedure. Patient will not need surgical clearance at this time.     Electronically signed by Bita Dietrich DO on 10/7/2021 at 10:27 AM

## 2021-10-07 NOTE — PROGRESS NOTES
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 Clinical symptoms of carpal tunnel syndrome. #2  No evidence to suggest cervical radiculopathy, brachial plexopathy, generalized neuropathy affecting the upper limbs. *Due to the severity of median neuropathies described above, referral to an upper extremity surgical specialist would be strongly recommended.

## 2021-10-13 ENCOUNTER — TELEPHONE (OUTPATIENT)
Dept: ORTHOPEDIC SURGERY | Age: 56
End: 2021-10-13

## 2021-10-13 NOTE — TELEPHONE ENCOUNTER
I called [facility] at [phone] and spoke with Maurice Hernandez who [facility]  [id number]    Call Ref#  [id number]

## 2021-10-21 ENCOUNTER — TELEPHONE (OUTPATIENT)
Dept: FAMILY MEDICINE CLINIC | Age: 56
End: 2021-10-21

## 2021-10-21 NOTE — TELEPHONE ENCOUNTER
Left voicemail we got clearance request from Dr. Lindsay Barnes will need  A physical/ surgical clearance appt at our office for clearance. Scanned form in to media.

## 2021-11-02 ENCOUNTER — HOSPITAL ENCOUNTER (OUTPATIENT)
Age: 56
Discharge: HOME | End: 2021-11-02
Payer: COMMERCIAL

## 2021-11-02 DIAGNOSIS — Z01.818 PRE-OP TESTING: Primary | ICD-10-CM

## 2021-11-02 LAB
INFLUENZA A: NOT DETECTED
INFLUENZA B: NOT DETECTED
SARS-COV-2 RNA, RT PCR: NOT DETECTED

## 2021-11-02 PROCEDURE — 87636 SARSCOV2 & INF A&B AMP PRB: CPT

## 2021-11-03 DIAGNOSIS — G89.29 CHRONIC LOW BACK PAIN WITHOUT SCIATICA, UNSPECIFIED BACK PAIN LATERALITY: ICD-10-CM

## 2021-11-03 DIAGNOSIS — M54.50 CHRONIC LOW BACK PAIN WITHOUT SCIATICA, UNSPECIFIED BACK PAIN LATERALITY: ICD-10-CM

## 2021-11-03 RX ORDER — TRAMADOL HYDROCHLORIDE 50 MG/1
50 TABLET ORAL EVERY 6 HOURS PRN
Qty: 60 TABLET | Refills: 0 | Status: SHIPPED | OUTPATIENT
Start: 2021-11-03 | End: 2021-11-23 | Stop reason: SDUPTHER

## 2021-11-04 ENCOUNTER — ANESTHESIA EVENT (OUTPATIENT)
Dept: OPERATING ROOM | Age: 56
End: 2021-11-04
Payer: COMMERCIAL

## 2021-11-08 ENCOUNTER — ANESTHESIA (OUTPATIENT)
Dept: OPERATING ROOM | Age: 56
End: 2021-11-08
Payer: COMMERCIAL

## 2021-11-08 ENCOUNTER — HOSPITAL ENCOUNTER (OUTPATIENT)
Age: 56
Setting detail: OUTPATIENT SURGERY
Discharge: HOME | End: 2021-11-08
Attending: STUDENT IN AN ORGANIZED HEALTH CARE EDUCATION/TRAINING PROGRAM | Admitting: STUDENT IN AN ORGANIZED HEALTH CARE EDUCATION/TRAINING PROGRAM
Payer: COMMERCIAL

## 2021-11-08 VITALS
TEMPERATURE: 97 F | OXYGEN SATURATION: 97 % | DIASTOLIC BLOOD PRESSURE: 70 MMHG | RESPIRATION RATE: 16 BRPM | BODY MASS INDEX: 50.5 KG/M2 | WEIGHT: 285 LBS | HEART RATE: 67 BPM | HEIGHT: 63 IN | SYSTOLIC BLOOD PRESSURE: 126 MMHG

## 2021-11-08 VITALS — SYSTOLIC BLOOD PRESSURE: 119 MMHG | DIASTOLIC BLOOD PRESSURE: 84 MMHG | OXYGEN SATURATION: 100 % | TEMPERATURE: 95 F

## 2021-11-08 DIAGNOSIS — G56.03 BILATERAL CARPAL TUNNEL SYNDROME: Primary | ICD-10-CM

## 2021-11-08 PROCEDURE — 2580000003 HC RX 258: Performed by: STUDENT IN AN ORGANIZED HEALTH CARE EDUCATION/TRAINING PROGRAM

## 2021-11-08 PROCEDURE — 2500000003 HC RX 250 WO HCPCS: Performed by: STUDENT IN AN ORGANIZED HEALTH CARE EDUCATION/TRAINING PROGRAM

## 2021-11-08 PROCEDURE — 6360000002 HC RX W HCPCS: Performed by: NURSE ANESTHETIST, CERTIFIED REGISTERED

## 2021-11-08 PROCEDURE — 2720000010 HC SURG SUPPLY STERILE: Performed by: STUDENT IN AN ORGANIZED HEALTH CARE EDUCATION/TRAINING PROGRAM

## 2021-11-08 PROCEDURE — 3600000012 HC SURGERY LEVEL 2 ADDTL 15MIN: Performed by: STUDENT IN AN ORGANIZED HEALTH CARE EDUCATION/TRAINING PROGRAM

## 2021-11-08 PROCEDURE — 7100000010 HC PHASE II RECOVERY - FIRST 15 MIN: Performed by: STUDENT IN AN ORGANIZED HEALTH CARE EDUCATION/TRAINING PROGRAM

## 2021-11-08 PROCEDURE — 6370000000 HC RX 637 (ALT 250 FOR IP): Performed by: STUDENT IN AN ORGANIZED HEALTH CARE EDUCATION/TRAINING PROGRAM

## 2021-11-08 PROCEDURE — 3700000000 HC ANESTHESIA ATTENDED CARE: Performed by: STUDENT IN AN ORGANIZED HEALTH CARE EDUCATION/TRAINING PROGRAM

## 2021-11-08 PROCEDURE — 2709999900 HC NON-CHARGEABLE SUPPLY: Performed by: STUDENT IN AN ORGANIZED HEALTH CARE EDUCATION/TRAINING PROGRAM

## 2021-11-08 PROCEDURE — 7100000011 HC PHASE II RECOVERY - ADDTL 15 MIN: Performed by: STUDENT IN AN ORGANIZED HEALTH CARE EDUCATION/TRAINING PROGRAM

## 2021-11-08 PROCEDURE — 3600000002 HC SURGERY LEVEL 2 BASE: Performed by: STUDENT IN AN ORGANIZED HEALTH CARE EDUCATION/TRAINING PROGRAM

## 2021-11-08 PROCEDURE — 2500000003 HC RX 250 WO HCPCS: Performed by: NURSE ANESTHETIST, CERTIFIED REGISTERED

## 2021-11-08 PROCEDURE — 3700000001 HC ADD 15 MINUTES (ANESTHESIA): Performed by: STUDENT IN AN ORGANIZED HEALTH CARE EDUCATION/TRAINING PROGRAM

## 2021-11-08 PROCEDURE — 64721 CARPAL TUNNEL SURGERY: CPT | Performed by: STUDENT IN AN ORGANIZED HEALTH CARE EDUCATION/TRAINING PROGRAM

## 2021-11-08 RX ORDER — SODIUM CHLORIDE 0.9 % (FLUSH) 0.9 %
10 SYRINGE (ML) INJECTION PRN
Status: CANCELLED | OUTPATIENT
Start: 2021-11-08

## 2021-11-08 RX ORDER — ONDANSETRON 2 MG/ML
4 INJECTION INTRAMUSCULAR; INTRAVENOUS EVERY 6 HOURS PRN
Status: CANCELLED | OUTPATIENT
Start: 2021-11-08

## 2021-11-08 RX ORDER — SODIUM CHLORIDE 0.9 % (FLUSH) 0.9 %
10 SYRINGE (ML) INJECTION PRN
Status: DISCONTINUED | OUTPATIENT
Start: 2021-11-08 | End: 2021-11-08 | Stop reason: HOSPADM

## 2021-11-08 RX ORDER — SODIUM CHLORIDE, SODIUM LACTATE, POTASSIUM CHLORIDE, CALCIUM CHLORIDE 600; 310; 30; 20 MG/100ML; MG/100ML; MG/100ML; MG/100ML
INJECTION, SOLUTION INTRAVENOUS CONTINUOUS
Status: CANCELLED | OUTPATIENT
Start: 2021-11-08

## 2021-11-08 RX ORDER — ONDANSETRON 4 MG/1
4 TABLET, ORALLY DISINTEGRATING ORAL EVERY 8 HOURS PRN
Status: CANCELLED | OUTPATIENT
Start: 2021-11-08

## 2021-11-08 RX ORDER — CLINDAMYCIN HYDROCHLORIDE 300 MG/1
300 CAPSULE ORAL 3 TIMES DAILY
Qty: 3 CAPSULE | Refills: 0 | Status: SHIPPED | OUTPATIENT
Start: 2021-11-08 | End: 2021-11-09

## 2021-11-08 RX ORDER — SODIUM CHLORIDE 9 MG/ML
25 INJECTION, SOLUTION INTRAVENOUS PRN
Status: DISCONTINUED | OUTPATIENT
Start: 2021-11-08 | End: 2021-11-08 | Stop reason: HOSPADM

## 2021-11-08 RX ORDER — LIDOCAINE HYDROCHLORIDE 10 MG/ML
INJECTION, SOLUTION INFILTRATION; PERINEURAL
Status: COMPLETED | OUTPATIENT
Start: 2021-11-08 | End: 2021-11-08

## 2021-11-08 RX ORDER — OXYCODONE HYDROCHLORIDE 5 MG/1
10 TABLET ORAL EVERY 4 HOURS PRN
Status: CANCELLED | OUTPATIENT
Start: 2021-11-08

## 2021-11-08 RX ORDER — IBUPROFEN 200 MG
600 TABLET ORAL EVERY 8 HOURS
Status: CANCELLED | OUTPATIENT
Start: 2021-11-08 | End: 2021-11-11

## 2021-11-08 RX ORDER — OXYCODONE HYDROCHLORIDE 5 MG/1
5 TABLET ORAL EVERY 4 HOURS PRN
Status: CANCELLED | OUTPATIENT
Start: 2021-11-08

## 2021-11-08 RX ORDER — ACETAMINOPHEN 500 MG
1000 TABLET ORAL ONCE
Status: COMPLETED | OUTPATIENT
Start: 2021-11-08 | End: 2021-11-08

## 2021-11-08 RX ORDER — SODIUM CHLORIDE, SODIUM LACTATE, POTASSIUM CHLORIDE, CALCIUM CHLORIDE 600; 310; 30; 20 MG/100ML; MG/100ML; MG/100ML; MG/100ML
INJECTION, SOLUTION INTRAVENOUS CONTINUOUS
Status: DISCONTINUED | OUTPATIENT
Start: 2021-11-08 | End: 2021-11-08 | Stop reason: HOSPADM

## 2021-11-08 RX ORDER — OXYCODONE HYDROCHLORIDE AND ACETAMINOPHEN 5; 325 MG/1; MG/1
1 TABLET ORAL EVERY 6 HOURS PRN
Qty: 12 TABLET | Refills: 0 | Status: SHIPPED | OUTPATIENT
Start: 2021-11-08 | End: 2021-11-11

## 2021-11-08 RX ORDER — PROPOFOL 10 MG/ML
INJECTION, EMULSION INTRAVENOUS PRN
Status: DISCONTINUED | OUTPATIENT
Start: 2021-11-08 | End: 2021-11-08 | Stop reason: SDUPTHER

## 2021-11-08 RX ORDER — MIDAZOLAM HYDROCHLORIDE 1 MG/ML
INJECTION INTRAMUSCULAR; INTRAVENOUS PRN
Status: DISCONTINUED | OUTPATIENT
Start: 2021-11-08 | End: 2021-11-08 | Stop reason: SDUPTHER

## 2021-11-08 RX ORDER — CLINDAMYCIN PHOSPHATE 900 MG/50ML
900 INJECTION INTRAVENOUS ONCE
Status: COMPLETED | OUTPATIENT
Start: 2021-11-08 | End: 2021-11-08

## 2021-11-08 RX ORDER — ONDANSETRON 4 MG/1
4 TABLET, FILM COATED ORAL 3 TIMES DAILY PRN
Qty: 15 TABLET | Refills: 0 | Status: SHIPPED | OUTPATIENT
Start: 2021-11-08 | End: 2022-03-30

## 2021-11-08 RX ORDER — KETAMINE HCL 50MG/ML(1)
SYRINGE (ML) INTRAVENOUS PRN
Status: DISCONTINUED | OUTPATIENT
Start: 2021-11-08 | End: 2021-11-08 | Stop reason: SDUPTHER

## 2021-11-08 RX ORDER — SODIUM CHLORIDE 0.9 % (FLUSH) 0.9 %
10 SYRINGE (ML) INJECTION EVERY 12 HOURS SCHEDULED
Status: CANCELLED | OUTPATIENT
Start: 2021-11-08

## 2021-11-08 RX ORDER — SODIUM CHLORIDE 0.9 % (FLUSH) 0.9 %
10 SYRINGE (ML) INJECTION EVERY 12 HOURS SCHEDULED
Status: DISCONTINUED | OUTPATIENT
Start: 2021-11-08 | End: 2021-11-08 | Stop reason: HOSPADM

## 2021-11-08 RX ORDER — SODIUM CHLORIDE 9 MG/ML
25 INJECTION, SOLUTION INTRAVENOUS PRN
Status: CANCELLED | OUTPATIENT
Start: 2021-11-08

## 2021-11-08 RX ADMIN — MIDAZOLAM 2 MG: 1 INJECTION INTRAMUSCULAR; INTRAVENOUS at 07:36

## 2021-11-08 RX ADMIN — PROPOFOL 30 MG: 10 INJECTION, EMULSION INTRAVENOUS at 07:47

## 2021-11-08 RX ADMIN — ACETAMINOPHEN 1000 MG: 500 TABLET ORAL at 07:24

## 2021-11-08 RX ADMIN — SODIUM CHLORIDE, POTASSIUM CHLORIDE, SODIUM LACTATE AND CALCIUM CHLORIDE: 600; 310; 30; 20 INJECTION, SOLUTION INTRAVENOUS at 07:23

## 2021-11-08 RX ADMIN — Medication 50 MG: at 07:39

## 2021-11-08 RX ADMIN — PROPOFOL 20 MG: 10 INJECTION, EMULSION INTRAVENOUS at 07:56

## 2021-11-08 RX ADMIN — CLINDAMYCIN PHOSPHATE 900 MG: 900 INJECTION, SOLUTION INTRAVENOUS at 07:41

## 2021-11-08 RX ADMIN — PROPOFOL 20 MG: 10 INJECTION, EMULSION INTRAVENOUS at 07:53

## 2021-11-08 RX ADMIN — PROPOFOL 30 MG: 10 INJECTION, EMULSION INTRAVENOUS at 07:49

## 2021-11-08 ASSESSMENT — PULMONARY FUNCTION TESTS
PIF_VALUE: 1
PIF_VALUE: 1
PIF_VALUE: 0
PIF_VALUE: 1
PIF_VALUE: 0
PIF_VALUE: 1
PIF_VALUE: 0
PIF_VALUE: 1
PIF_VALUE: 0

## 2021-11-08 ASSESSMENT — ENCOUNTER SYMPTOMS
EYE REDNESS: 0
BACK PAIN: 0
SHORTNESS OF BREATH: 0
COLOR CHANGE: 0
VOMITING: 0
PHOTOPHOBIA: 0
STRIDOR: 0
ABDOMINAL PAIN: 0
WHEEZING: 0
NAUSEA: 0
EYE PAIN: 0
COUGH: 0
FACIAL SWELLING: 0

## 2021-11-08 ASSESSMENT — PAIN SCALES - GENERAL
PAINLEVEL_OUTOF10: 0
PAINLEVEL_OUTOF10: 0

## 2021-11-08 ASSESSMENT — PAIN - FUNCTIONAL ASSESSMENT: PAIN_FUNCTIONAL_ASSESSMENT: 0-10

## 2021-11-08 NOTE — ANESTHESIA PRE PROCEDURE
Department of Anesthesiology  Preprocedure Note       Name:  Saniya Littleton   Age:  61 y.o.  :  1965                                          MRN:  [id number]         Date:  2021      Surgeon: Emilie Juarez):  Jerry Ledesma DO    Procedure: Procedure(s):  LEFT CARPAL TUNNEL RELEASE    Medications prior to admission:   Prior to Admission medications    Medication Sig Start Date End Date Taking? Authorizing Provider   lisinopril (PRINIVIL;ZESTRIL) 20 MG tablet Take 1 tablet by mouth nightly 21  Yes Devon Coates DO   omeprazole (PRILOSEC) 20 MG delayed release capsule Take 1 capsule by mouth every morning (before breakfast) 21  Yes Devon Coates DO   naproxen (NAPROSYN) 500 MG tablet Take 1 tablet by mouth 2 times daily 21  Yes Devon Coates DO   gabapentin (NEURONTIN) 300 MG capsule Take 1 capsule by mouth nightly for 30 days. Intended supply: 30 days 21 Yes Devon Coates DO   metoprolol tartrate (LOPRESSOR) 25 MG tablet Take 1 tablet by mouth 2 times daily 20  Yes Umberto Mathew MD   GuaiFENesin (MUCINEX PO) Take 1,200 mg by mouth 2 times daily Over The Counter, \"I Take A Regular One In The Morning, I Take DM At Night\"   Yes Historical Provider, MD   traMADol (ULTRAM) 50 MG tablet Take 1 tablet by mouth every 6 hours as needed for Pain for up to 30 days.  11/3/21 12/3/21  Devon Coates DO       Current medications:    Current Facility-Administered Medications   Medication Dose Route Frequency Provider Last Rate Last Admin    acetaminophen (TYLENOL) tablet 1,000 mg  1,000 mg Oral Once Jerry Ledesma DO        lactated ringers infusion   IntraVENous Continuous Jerry Ledesma DO        sodium chloride flush 0.9 % injection 10 mL  10 mL IntraVENous 2 times per day Jerry Ledesma DO        sodium chloride flush 0.9 % injection 10 mL  10 mL IntraVENous PRN Jerry Ledesma DO        0.9 % sodium chloride infusion  25 mL IntraVENous PRN [employer], DO        ceFAZolin (ANCEF) 3,000 mg in dextrose 5 % 100 mL IVPB  3,000 mg IntraVENous On Call to [address], DO           Allergies:     Allergies   Allergen Reactions    Ampicillin Anaphylaxis    Bactrim [Sulfamethoxazole-Trimethoprim] Anaphylaxis    Flagyl [Metronidazole]      \"Welts\"    Tape Ricke Guard Tape]      \"Tears The Skin\"       Problem List:    Patient Active Problem List   Diagnosis Code    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    Intermittent palpitations R00.2    Essential hypertension I10    Obesity, morbid ([address] E66.01    Maxillary sinusitis, chronic J32.0    Environmental allergies Z91.09    possible PSVT (paroxysmal supraventricular tachycardia) ([facility]- once in 6 month, on pulse ox 160 bpm for 20 min and resolved on 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    Chronic low back pain without sciatica M54.50, G89.29    Other hyperlipidemia E78.49    Episode of recurrent major depressive disorder (HCC) F33.9    Bilateral carpal tunnel syndrome G56.03       Past Medical History:        Diagnosis Date    Acid reflux     Anxiety     Arthritis     \"Hands And Hips\"    Bronchitis Last Episode 2-18    Depression     Environmental allergies     Granulomatous hepatitis Dx 5-96    Heart palpitations     Hyperlipidemia     Hypertension     Pneumonia Dx 4-17    Shortness of breath on exertion     Teeth missing     Upper And Lower    Wears glasses     [place] teeth extracted     4 [place] Teeth Extracted In Past       Past Surgical History:        Procedure Laterality Date     SECTION  10/16/1987    CHOLECYSTECTOMY, LAPAROSCOPIC  1996    ENDOSCOPY, COLON, DIAGNOSTIC  1996    SINUS ENDOSCOPY Bilateral 2018    SINUS ENDOSCOPY FUNCTIONAL SURGERY BILATERAL MAXILLARY ANSTROSTOMY WITH TISSUE REMOVAL performed by Sergey Stewart MD at [facility] Bilateral 2018    SUBMUCOUS RESECTION TURBINOPLASTY  BILATERAL performed by Gauri Villarreal MD at [address] EXTRACTION      4 [place] Teeth Extracted In Past       Social History:    Social History     Tobacco Use    Smoking status: Never Smoker    Smokeless tobacco: Never Used   Substance Use Topics    Alcohol use: Yes     Comment: 1 - 2 mixed drinks a week                                Counseling given: Not Answered      Vital Signs (Current):   Vitals:    11/08/21 0654 11/08/21 0706   BP: 124/77    Pulse: 68    Resp: 20    Temp: 97.1 °F (36.2 °C)    TempSrc: Temporal    SpO2: 99%    Weight:  285 lb (129.3 kg)   Height:  5' 3\" (1.6 m)                                              BP Readings from Last 3 Encounters:   11/08/21 124/77   09/29/21 128/82   07/01/21 129/84       NPO Status:                                                                                 BMI:   Wt Readings from Last 3 Encounters:   11/08/21 285 lb (129.3 kg)   10/07/21 291 lb (132 kg)   09/29/21 291 lb (132 kg)     Body mass index is 50.49 kg/m². CBC:   Lab Results   Component Value Date    WBC 9.3 03/11/2020    RBC 4.46 03/11/2020    HGB 13.3 03/11/2020    HCT 40.2 03/11/2020    MCV 90.1 03/11/2020    RDW 14.0 03/11/2020     03/11/2020       CMP:   Lab Results   Component Value Date     09/13/2021    K 4.4 09/13/2021     09/13/2021    CO2 24 09/13/2021    BUN 16 09/13/2021    CREATININE 0.7 09/13/2021    GFRAA >60 09/13/2021    AGRATIO 1.3 03/11/2020    LABGLOM >60 09/13/2021    LABGLOM 87 04/27/2019    GLUCOSE 95 09/13/2021    PROT 6.9 09/13/2021    PROT 6.9 09/13/2021    CALCIUM 9.7 09/13/2021    BILITOT 0.6 09/13/2021    ALKPHOS 137 09/13/2021    AST 20 09/13/2021    ALT 13 09/13/2021       POC Tests: No results for input(s): POCGLU, POCNA, POCK, POCCL, POCBUN, POCHEMO, POCHCT in the last 72 hours.     Coags:   Lab Results   Component Value Date    INR 0.99 04/27/2019    APTT 30.0 04/27/2019       HCG (If Applicable): No results found for: PREGTESTUR, PREGSERUM, HCG, HCGQUANT     ABGs: No results found for: PHART, PO2ART, SXF9AGS, CUF9FPX, BEART, G5EQCQIB     Type & Screen (If Applicable):  No results found for: LABABO, LABRH    Drug/Infectious Status (If Applicable):  No results found for: HIV, HEPCAB    COVID-19 Screening (If Applicable):   Lab Results   Component Value Date    COVID19 NOT DETECTED 11/02/2021           Anesthesia Evaluation  Patient summary reviewed no history of anesthetic complications:   Airway: Mallampati: II  TM distance: >3 FB   Neck ROM: full  Mouth opening: > = 3 FB Dental: normal exam         Pulmonary:Negative Pulmonary ROS breath sounds clear to auscultation                             Cardiovascular:  Exercise tolerance: good (>4 METS),   (+) hypertension:, hyperlipidemia        Rhythm: regular  Rate: normal           Beta Blocker:  Dose within 24 Hrs         Neuro/Psych:   (+) depression/anxiety             GI/Hepatic/Renal:   (+) GERD:, morbid obesity (super morbid bmi 50.5)          Endo/Other:    (+) : arthritis: OA., .                 Abdominal:             Vascular: negative vascular ROS. Other Findings:             Anesthesia Plan      MAC     ASA 4       Induction: intravenous. Anesthetic plan and risks discussed with patient. Plan discussed with CRNA.                   Landon Frankel DO   11/8/2021

## 2021-11-08 NOTE — H&P
10/13/2021   No chief complaint on file. Updated HPI: Patient is here to undergo a left open carpal tunnel release. Patient has no changes in her medical history since last being seen. No new injuries or complaints. Patient denies any constitutional symptoms. History of Present Illness:                             Leanna Chavarria is a 61 y.o. 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 Patient has an EMG already performed that does confirm bilateral carpal tunnel syndrome. Patient states that symptomatically the left is worse than the right. Patient states that their symptoms have been occurring for approximately 1 year. They state it wakes them from sleep at night. The pain is currently rated moderate. Patient states that they have numbness and tingling in the median nerve distribution of the bilateral hands . They not any ulnar nerve symptoms. They have had no treatment at this point including splinting, cortisone injection, anti-inflammatories. Related history: negative for prior surgery, trauma, arthritis or disorders. Is affecting ADLs. Pain is 7/10 at it's worst.      Medical History  Patient's medications, allergies, past medical, surgical, social and family histories were reviewed and updated as appropriate.     Past Medical History:   Diagnosis Date    Acid reflux     Anxiety     Arthritis     \"Hands And Hips\"    Bronchitis Last Episode 2-18    Depression     Environmental allergies     Granulomatous hepatitis Dx 5-96    Heart palpitations     Hyperlipidemia     Hypertension     Pneumonia Dx 4-17    Shortness of breath on exertion     Teeth missing     Upper And Lower    Wears glasses     [place] teeth extracted     4 [place] Teeth Extracted In Past     Past Surgical History:   Procedure Laterality Date     SECTION  10/16/1987    CHOLECYSTECTOMY, LAPAROSCOPIC 05/1996    ENDOSCOPY, COLON, DIAGNOSTIC  05/1996    SINUS ENDOSCOPY Bilateral 12/13/2018    SINUS ENDOSCOPY FUNCTIONAL SURGERY BILATERAL MAXILLARY ANSTROSTOMY WITH TISSUE REMOVAL performed by Indra Trejo MD at [address] Bilateral 12/13/2018    SUBMUCOUS RESECTION TURBINOPLASTY  BILATERAL performed by Indra Trejo MD at [address] EXTRACTION      4 [place] Teeth Extracted In Past     Family History   Problem Relation Age of Onset    Allergy (Severe) Mother     Other Mother         Fibromyalgia    High Blood Pressure Mother     Diabetes Father         \"Pre Diabetic\"    Alcohol Abuse Father         \"Recovered Alcoholic\"   [facility] Substance Abuse Father         \"Recovered Alcoholic\"   [facility] Cancer Maternal Grandfather         prostate    Cancer Paternal Grandfather         liver    Hypertension Other         grandmother    Diabetes Other         grandmother     Social History     Socioeconomic History    Marital status:      Spouse name: Not on file    Number of children: Not on file    Years of education: Not on file    Highest education level: Not on file   Occupational History    Not on file   Tobacco Use    Smoking status: Never Smoker    Smokeless tobacco: Never Used   Vaping Use    Vaping Use: Never used   Substance and Sexual Activity    Alcohol use: Yes     Comment: 1 - 2 mixed drinks a week    Drug use: No    Sexual activity: Yes     Partners: Male   Other Topics Concern    Not on file   Social History Narrative    Not on file     Social Determinants of Health     Financial Resource Strain:     Difficulty of Paying Living Expenses: Not on file   Food Insecurity:     Worried About Running Out of Food in the Last Year: Not on file    Winter of Food in the Last Year: Not on file   Transportation Needs:     Lack of Transportation (Medical): Not on file    Lack of Transportation (Non-Medical):  Not on file   Physical Activity:     Days of Exercise per Week: Not on file    Minutes of Exercise per Session: Not on file   Stress:     Feeling of Stress : Not on file   Social Connections:     Frequency of Communication with Friends and Family: Not on file    Frequency of Social Gatherings with Friends and Family: Not on file    Attends Anabaptism Services: Not on file    Active Member of Clubs or Organizations: Not on file    Attends Club or Organization Meetings: Not on file    Marital Status: Not on file   Intimate Partner Violence:     Fear of Current or Ex-Partner: Not on file    Emotionally Abused: Not on file    Physically Abused: Not on file    Sexually Abused: Not on file   Housing Stability:     Unable to Pay for Housing in the Last Year: Not on file    Number of [place] in the Last Year: Not on file    Unstable Housing in the Last Year: Not on file     No current facility-administered medications for this encounter. Allergies   Allergen Reactions    Ampicillin Anaphylaxis    Bactrim [Sulfamethoxazole-Trimethoprim] Anaphylaxis    Flagyl [Metronidazole]      \"Welts\"    Tape Nancey Brine Tape]      \"Tears The Skin\"         Review of Systems   Constitutional: Negative for activity change, appetite change, chills, diaphoresis, fatigue, fever and unexpected weight change. HENT: Negative for congestion, ear pain, facial swelling, hearing loss and sneezing. Eyes: Negative for photophobia, pain and redness. Respiratory: Negative for cough, shortness of breath, wheezing and stridor. Cardiovascular: Negative for chest pain, palpitations and leg swelling. Gastrointestinal: Negative for abdominal pain, nausea and vomiting. Endocrine: Negative for cold intolerance and heat intolerance. Musculoskeletal: Positive for myalgias. Negative for arthralgias, back pain, gait problem, joint swelling, neck pain and neck stiffness. Skin: Negative for color change, pallor, rash and wound. Neurological: Positive for numbness.  Negative for dizziness, facial asymmetry and weakness. Examination:  General Exam:  Vitals: /77   Pulse 68   Temp 97.1 °F (36.2 °C) (Temporal)   Resp 20   SpO2 99%    Physical Exam  Constitutional:       General: She is not in acute distress. Appearance: Normal appearance. She is obese. HENT:      Head: Normocephalic and atraumatic. Eyes:      General:         Right eye: No discharge. Left eye: No discharge. Extraocular Movements: Extraocular movements intact. Pulmonary:      Effort: Pulmonary effort is normal.      Breath sounds: No wheezing. Chest:      Chest wall: No tenderness. Musculoskeletal:         General: No swelling, tenderness, deformity or signs of injury. Right wrist: Normal.      Left wrist: Normal.      Right hand: No swelling, deformity, lacerations or bony tenderness. Normal range of motion. Normal strength. Decreased sensation of the median distribution. Normal capillary refill. Left hand: No swelling, deformity, lacerations or bony tenderness. Normal range of motion. Normal strength. Decreased sensation of the median distribution. Normal capillary refill. Cervical back: Normal range of motion. Skin:     General: Skin is warm and dry. Capillary Refill: Capillary refill takes less than 2 seconds. Neurological:      Mental Status: She is alert. Sensory: Sensory deficit present. Motor: No weakness.       Gait: Gait normal.      Deep Tendon Reflexes: Reflexes normal.   Psychiatric:         Mood and Affect: Mood normal.         Behavior: Behavior normal.        Bilateral upper extremity EXAM:    OBSERVATION / INSPECTION:     Swelling none     Deformity none    Discoloration none     Scars none     Atrophy none      TENDERNESS / CREPITUS (T / C):      1st Dorsal compartment -/-    FCR -/-    FCU -/-    Ulnar Styloid -/-    Radial Styloid -/-    Palm -/-    Metacarpals -/-    Thumb CMC -/-     Thumb MCP -/-    Lesser MCPs -/-    PIP -/-    DIP -/-      Range of motion: ('*' = with pain)     Right Elbow     AROM (PROM)     Extension 0 deg  (5 deg)     Flexion 145 deg (145 deg)        Pronation 90 deg  (90 deg)     Supination 80 deg  (80 deg)                Left Elbow     AROM (PROM)     Extension 0 deg  (5 deg)     Flexion 145 deg (145 deg)        Pronation 90 deg  (90 deg)     Supination 80 deg  (80 deg)        Right Wrist      Extension 80 deg (85 deg)     Flexion 80 deg (85 deg)        Ulnar Deviation  35 deg (40 deg)    Radial Deviation 35 deg (40 deg)             Left Wrist     AROM (PROM)     Extension 80 deg (85 deg)     Flexion 80 deg (85 deg)        Ulnar Deviation  35 deg (40 deg)    Radial Deviation 35 deg (40 deg)         WRIST EXAMINATION:    See above noted areas of tenderness. Finkelstein's Test Neg    TTP at 1st Dorsal Compartment neg    Tinel's Test - Carpal Tunnel positive bilaterally    Phalen's Test positive bilaterally    Median Nerve Compression Test positive bilaterally    Ulnar-sided Compression Test neg    Snuff box tenderness neg         STRENGTH: ('*' = with pain)     Elbow Flexion: 5/5    Elbow Extension: 5/5    Wrist Flexion: 5/5    Wrist Extension: 5/5    : 5/5    Intrinsics: 5/5    EPL (Extensor pollicis longus): 5/5    Pinch Mechanism: 5/5      EXTREMITY NEURO-VASCULAR EXAMINATION: Sensation grossly intact to light touch all dermatomal regions. DTR 2+ Biceps, Triceps, BR and Negative Saul's sign. Grossly intact motor function at Elbow, Wrist and Hand. Distal pulses radial and ulnar 2+, brisk cap refill, symmetric. 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  Clinical symptoms of carpal tunnel syndrome.   #2  No evidence to suggest cervical radiculopathy, brachial plexopathy, generalized neuropathy affecting the upper limbs. 3 view of the right wrist and hand in a skeletally mature patient demonstrates no acute osseous or soft tissue abnormalities. Alignment is appropriate throughout. No sign of any fracture or soft tissue avulsion. No sign of any subluxation. No significant soft tissue swelling. 3 view of the left wrist and hand in a skeletally mature patient demonstrates no acute osseous or soft tissue abnormalities. Alignment is appropriate throughout. No sign of any fracture or soft tissue avulsion. No sign of any subluxation. No significant soft tissue swelling. Office Procedures:  No orders of the defined types were placed in this encounter. Assessment and Plan    A: Bilateral carpal tunnel syndrome. P:     I had a thorough discussion with the patient regarding her EMG findings as well as her physical exam findings. I explained that likely she has no significant weakness on exam although she does have significant altered sensation. Patient would like to proceed with surgery and chose to undergo left open carpal tunnel release first.  I explained that we will schedule her for the right open carpal tunnel release when she is into the postoperative period and felt to be well-healed on the left side. I did discuss with the patient the possibility of more conservative options including injections and bracing but due to the severity of her EMG findings I felt it was necessary to proceed with surgery at this time. I discussed surgical intervention in the form of:  1. Left open carpal tunnel release. I explained risks, benefits, possible complications of the procedure and answered all questions for the patient. I explained postoperative rehabilitation protocol and expectations with the patient today. The patient understands and consents to the procedure. Patient will not need surgical clearance at this time.       The patient was counseled at length about the risks of natty Covid-19 during their perioperative period and any recovery window from their procedure. The patient was made aware that natty Covid-19  may worsen their prognosis for recovering from their procedure  and lend to a higher morbidity and/or mortality risk. All material risks, benefits, and reasonable alternatives including postponing the procedure were discussed. The patient does wish to proceed with the procedure at this time. Pt seen and examined, No change in H+P.       Electronically signed by Mariza Plaza DO on 11/8/2021 at 6:57 AM

## 2021-11-08 NOTE — OP NOTE
Operative Note      Patient: Cielo Hill  YOB: 1965  MRN: [id number]    Date of Procedure: 11/8/2021    Pre-Op Diagnosis: Carpal tunnel syndrome on left [G56.02]    Post-Op Diagnosis: Same       Procedure(s):  LEFT CARPAL TUNNEL RELEASE    Surgeon(s):  Phoenix Barragan DO    Assistant:   * No surgical staff found *    Anesthesia: Local    Estimated Blood Loss (mL): Minimal    Complications: None    Specimens:   * No specimens in log *    Implants:  * No implants in log *      Drains: * No LDAs found *    Findings: Please see dictated op report    Detailed Description of Procedure:     DATE OF PROCEDURE: 11/8/2021    PREOPERATIVE DIAGNOSES:  1.  Bilateral carpal tunnel syndrome. POSTOPERATIVE DIAGNOSES:  1. Left carpal tunnel syndrome. PROCEDURES:  1. Left carpal tunnel release. ATTENDING SURGEON:  Regan Jacobson DO    ANESTHESIA:  MAC with local.    ESTIMATED BLOOD LOSS:  <5 mL. TOTAL TOURNIQUET TIME:  11 minutes. FLUIDS:  300  mL of crystalloids. INDICATIONS FOR PROCEDURE:  The patient is a 58-year-old female with a  long-standing history of worsening, painful numbness and tingling in their  bilateral hand. For that, they underwent conservative treatment with no relief of  symptoms. EMG was subsequently obtained, which showed evidence of bilateral carpal tunnel syndrome. Given the persistent symptoms despite conservative treatment and with the EMG findings, I   recommended surgical treatment. I their questions, they consented to  undergo the above procedure. They chose to have the left sided procedure done first.    DESCRIPTION OF PROCEDURE:  The patient was seen and evaluated in the  preoperative holding area where the left upper extremity was signed in their  presence. At this point, care of the patient was turned over to anesthesia  team, was transported back to the operative suite.   They were placed supine  on the operating table with the left upper extremity on an arm board. MAC  anesthesia was applied and once adequate anesthesia was obtained, the left  upper extremity was prepped and draped in usual sterile fashion. Preoperative antibiotics were administered. At this point, a time-out was  performed and all in attendance were in agreement. I marked out the planned surgical incision overlying the volar aspect of  the left wrist along the radial border of the ring finger proximal to  [facility] cardinal line. I then injected approximately 6 mL of 1% plain lidocaine around the carpal tunnel incision. I then exsanguinated the left upper extremity using Esmarch and tourniquet was inflated to 200 mmHg. I made a longitudinal incision overlying the previously marked location. I carried  sharp dissection down to the level of the transcarpal ligament. I then  placed a Esteraner retractor for further visualization. I then incised  through the transcarpal ligament exposing the median nerve. I then placed  a retractor at the distal aspect of the incision. I then bluntly dissected deep to the ligament. I then positioned the tip of the scissors along the ligament and applied gentle pressure distally incising through the distal aspect of the transverse carpal ligament until fat was encountered. I then bluntly probed with scissors to ensure complete release had been obtained. I then placed a retractor at the proximal aspect of the incision. I bluntly dissected deep to the ligament. I then positioned the tip of the scissors along the ligament and applied gentle pressure proximally incising the ligament in that direction. I then bluntly probed to ensure complete release had been obtained. I then closed the skin incision using 3-0 nylon suture in horizontal mattress fashion. The tourniquet was then deflated for a total of 11 minutes and adequate hemostasis was maintained.   I then applied a sterile soft dressing to the left upper extremity and the patient was placed in a volar splint to limit wrist movement and this was wrapped in an ace wrap. The patient was then awakened from anesthesia and transported to PACU in stable condition. They appeared to have tolerated the procedure well. PROGNOSIS:  At this point, they will be discharged to home with a short  course of oral antibiotics as well as pain medication. They can have  immediate range of motion of the left elbow and fingers but not the wrist, and they are to avoid heavy lifting,  pushing or pulling. I will see them back in the office in 2 weeks for splint and suture removal and will continue to monitor their progress in the outpatient setting for resolution of their symptoms.         Doris Closs, DO    Electronically signed by Zahraa Nance DO on 11/8/2021 at 8:34 AM

## 2021-11-08 NOTE — ANESTHESIA POSTPROCEDURE EVALUATION
Department of Anesthesiology  Postprocedure Note    Patient: Scotty Schaffer  MRN: [id number]  YOB: 1965  Date of evaluation: 11/8/2021  Time:  8:19 AM     Procedure Summary     Date: 11/08/21 Room / Location: [address] [place] [address]    Anesthesia Start: 0730 Anesthesia Stop: 0818    Procedure: LEFT CARPAL TUNNEL RELEASE (Left Wrist) Diagnosis:       Carpal tunnel syndrome on left      (Carpal tunnel syndrome on left [G56.02])    Surgeons: Cee Caballero DO Responsible Provider: Heath Burleson DO    Anesthesia Type: MAC ASA Status: 4          Anesthesia Type: MAC    Aggie Phase I:      Aggie Phase II:      Last vitals: Reviewed and per EMR flowsheets. Anesthesia Post Evaluation    Patient location during evaluation: bedside  Patient participation: complete - patient participated  Level of consciousness: awake  Pain score: 0  Airway patency: patent  Nausea & Vomiting: no nausea and no vomiting  Complications: no  Cardiovascular status: blood pressure returned to baseline  Respiratory status: acceptable  Hydration status: euvolemic  Comments: Pt awake, talking, no pain.

## 2021-11-17 ENCOUNTER — PATIENT MESSAGE (OUTPATIENT)
Dept: FAMILY MEDICINE CLINIC | Age: 56
End: 2021-11-17

## 2021-11-17 DIAGNOSIS — G89.29 CHRONIC LOW BACK PAIN WITHOUT SCIATICA, UNSPECIFIED BACK PAIN LATERALITY: ICD-10-CM

## 2021-11-17 DIAGNOSIS — M54.50 CHRONIC LOW BACK PAIN WITHOUT SCIATICA, UNSPECIFIED BACK PAIN LATERALITY: ICD-10-CM

## 2021-11-23 RX ORDER — TRAMADOL HYDROCHLORIDE 50 MG/1
50 TABLET ORAL EVERY 6 HOURS PRN
Qty: 60 TABLET | Refills: 0 | Status: SHIPPED | OUTPATIENT
Start: 2021-11-23 | End: 2022-01-23 | Stop reason: SDUPTHER

## 2021-11-24 ENCOUNTER — OFFICE VISIT (OUTPATIENT)
Dept: ORTHOPEDIC SURGERY | Age: 56
End: 2021-11-24

## 2021-11-24 VITALS
HEART RATE: 61 BPM | BODY MASS INDEX: 50.5 KG/M2 | RESPIRATION RATE: 16 BRPM | WEIGHT: 285 LBS | HEIGHT: 63 IN | OXYGEN SATURATION: 99 %

## 2021-11-24 DIAGNOSIS — Z01.818 PRE-OP TESTING: ICD-10-CM

## 2021-11-24 DIAGNOSIS — Z09 POSTOP CHECK: Primary | ICD-10-CM

## 2021-11-24 PROCEDURE — 99024 POSTOP FOLLOW-UP VISIT: CPT | Performed by: STUDENT IN AN ORGANIZED HEALTH CARE EDUCATION/TRAINING PROGRAM

## 2021-11-24 NOTE — PROGRESS NOTES
Date of surgery:   11-8-2021     Procedure:  Left open carpal tunnel release      History:  Patient is here in follow up regarding their 2-week postop left open carpal tunnel release    Patient is doing well. They have zero/10 pain. They deny chest pain, SOB, calf pain,fever,wound drainage. No other issues. Patient states they have been compliant with restrictions. No new complaints. Mild relief of her carpal tunnel symptoms since the surgery. Physical:   Patient demonstrates appropriate mood and affect. Sutures removed without complication    Left upper extremity exam:   The incisions are clean, dry, intact and nontender with no erythema. They have no edema, the Arm compartments are soft . There are No cords or calf tenderness. No significant calf/ankle edema. They are neurovascularly intact distally. ROM of the wrist and fingers full and intact without pain. .    Imaging:   No imaging     Impression: Status post above, doing well        Plan:   -Treatment course reviewed with the patient  -Encouraged to do range of motion exercises.  Out of splint today  -rest/elevation, ice as needed  -Discussed no lifting more than 5 pounds as well as no soaking the wound for the next 4 weeks until her next follow-up visit  -f/u in 4 week(s)  -Patient will be scheduled to undergo the right carpal tunnel release in 4 weeks as long as she remains without postoperative issue on the left  -f/u sooner prn any issues

## 2021-11-24 NOTE — PROGRESS NOTES
Date of surgery:   November 8th, 2021    History:  Ms. Dalia Johnson is here in follow up regarding her left hand  She is doing rather well. She is having 0/10 pain. She denies chest pain, SOB, calf pain,fever,wound drainage. No other issues. Physical: She demonstrates appropriate mood and affect. Left hand exam:  The incisions are clean, dry, intact and nontender with no erythema. She has no edema, the Arm compartments are soft . Stitches were removed today.

## 2021-11-24 NOTE — PATIENT INSTRUCTIONS
Continue weight-bearing as tolerated. Continue range of motion exercises as instructed. Ice and elevate as needed. Tylenol or Motrin for pain.   Follow up in 4 weeks to discuss right CTR

## 2021-12-07 ENCOUNTER — OFFICE VISIT (OUTPATIENT)
Dept: CARDIOLOGY CLINIC | Age: 56
End: 2021-12-07
Payer: COMMERCIAL

## 2021-12-07 VITALS
DIASTOLIC BLOOD PRESSURE: 82 MMHG | WEIGHT: 293 LBS | HEIGHT: 63 IN | SYSTOLIC BLOOD PRESSURE: 151 MMHG | BODY MASS INDEX: 51.91 KG/M2 | HEART RATE: 82 BPM

## 2021-12-07 DIAGNOSIS — E66.01 OBESITY, MORBID (HCC): ICD-10-CM

## 2021-12-07 DIAGNOSIS — R00.2 INTERMITTENT PALPITATIONS: ICD-10-CM

## 2021-12-07 DIAGNOSIS — I10 ESSENTIAL HYPERTENSION: Primary | ICD-10-CM

## 2021-12-07 DIAGNOSIS — I47.1 PSVT (PAROXYSMAL SUPRAVENTRICULAR TACHYCARDIA) (HCC): ICD-10-CM

## 2021-12-07 PROCEDURE — 93000 ELECTROCARDIOGRAM COMPLETE: CPT | Performed by: INTERNAL MEDICINE

## 2021-12-07 PROCEDURE — 99214 OFFICE O/P EST MOD 30 MIN: CPT | Performed by: INTERNAL MEDICINE

## 2021-12-07 NOTE — PROGRESS NOTES
Chief Complaint   Patient presents with    1 Year Follow Up    Palpitations   Pt here to discuss results of holter monitor  Seen in ER sept 15, for high HR/palpitation 180 bpm      1 year follow up. EKG done today.   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)    Chronic low back pain without sciatica    Other hyperlipidemia    Episode of recurrent major depressive disorder ([address]    Bilateral carpal tunnel syndrome       Past Surgical History:   Procedure Laterality Date    CARPAL TUNNEL RELEASE Left 11/8/2021    LEFT CARPAL TUNNEL RELEASE performed by Janki Soler DO at [address]  10/16/1987    CHOLECYSTECTOMY, LAPAROSCOPIC  05/1996    ENDOSCOPY, COLON, DIAGNOSTIC  05/1996    SINUS ENDOSCOPY Bilateral 12/13/2018    SINUS ENDOSCOPY FUNCTIONAL SURGERY BILATERAL MAXILLARY ANSTROSTOMY WITH TISSUE REMOVAL performed by Bobo Natarajan MD at [address] Bilateral 12/13/2018    SUBMUCOUS RESECTION TURBINOPLASTY  BILATERAL performed by Bobo Natarajan MD at [address]      4 [place] Teeth Extracted In Past       Allergies   Allergen Reactions    Ampicillin Anaphylaxis    Bactrim [Sulfamethoxazole-Trimethoprim] Anaphylaxis    Flagyl [Metronidazole]      \"Welts\"    Tape Anna Bianca Tape]      \"Tears The Skin\"        Family History   Problem Relation Age of Onset    Allergy (Severe) Mother     Other Mother         Fibromyalgia    High Blood Pressure Mother  Diabetes Father         \"Pre Diabetic\"    Alcohol Abuse Father         \"Recovered Alcoholic\"    Substance Abuse Father         \"Recovered Alcoholic\"    Cancer Maternal Grandfather         prostate    Cancer Paternal Grandfather         liver    Hypertension Other         grandmother    Diabetes Other         grandmother        Social History     Socioeconomic History    Marital status:      Spouse name: Not on file    Number of children: Not on file    Years of education: Not on file    Highest education level: Not on file   Occupational History    Not on file   Tobacco Use    Smoking status: Never Smoker    Smokeless tobacco: Never Used   Vaping Use    Vaping Use: Never used   Substance and Sexual Activity    Alcohol use: Yes     Comment: 1 - 2 mixed drinks a week    Drug use: No    Sexual activity: Yes     Partners: Male   Other Topics Concern    Not on file   Social History Narrative    Not on file     Social Determinants of Health     Financial Resource Strain:     Difficulty of Paying Living Expenses: Not on file   Food Insecurity:     Worried About Running Out of Food in the Last Year: Not on file    Winter of Food in the Last Year: Not on file   Transportation Needs:     Lack of Transportation (Medical): Not on file    Lack of Transportation (Non-Medical):  Not on file   Physical Activity:     Days of Exercise per Week: Not on file    Minutes of Exercise per Session: Not on file   Stress:     Feeling of Stress : Not on file   Social Connections:     Frequency of Communication with Friends and Family: Not on file    Frequency of Social Gatherings with Friends and Family: Not on file    Attends Orthodoxy Services: Not on file    Active Member of Clubs or Organizations: Not on file    Attends Club or Organization Meetings: Not on file    Marital Status: Not on file   Intimate Partner Violence:     Fear of Current or Ex-Partner: Not on file    Emotionally Abused: Not on file    Physically Abused: Not on file    Sexually Abused: Not on file   Housing Stability:     Unable to Pay for Housing in the Last Year: Not on file    Number of Places Lived in the Last Year: Not on file    Unstable Housing in the Last Year: Not on file       Current Outpatient Medications   Medication Sig Dispense Refill    traMADol (ULTRAM) 50 MG tablet Take 1 tablet by mouth every 6 hours as needed for Pain for up to 30 days. 60 tablet 0    ondansetron (ZOFRAN) 4 MG tablet Take 1 tablet by mouth 3 times daily as needed for Nausea or Vomiting 15 tablet 0    lisinopril (PRINIVIL;ZESTRIL) 20 MG tablet Take 1 tablet by mouth nightly 90 tablet 1    omeprazole (PRILOSEC) 20 MG delayed release capsule Take 1 capsule by mouth every morning (before breakfast) 90 capsule 1    naproxen (NAPROSYN) 500 MG tablet Take 1 tablet by mouth 2 times daily 180 tablet 1    gabapentin (NEURONTIN) 300 MG capsule Take 1 capsule by mouth nightly for 30 days. Intended supply: 30 days 30 capsule 5    metoprolol tartrate (LOPRESSOR) 25 MG tablet Take 1 tablet by mouth 2 times daily 180 tablet 3    GuaiFENesin (MUCINEX PO) Take 1,200 mg by mouth 2 times daily Over The Counter, \"I Take A Regular One In The Morning, I Take DM At Night\"       No current facility-administered medications for this visit. Review of Systems -     General ROS: negative  Psychological ROS: negative  Hematological and Lymphatic ROS: No history of blood clots or bleeding disorder. Respiratory ROS: no cough, shortness of breath, or wheezing  Cardiovascular ROS: no chest pain or dyspnea on exertion  Gastrointestinal ROS: negative  Genito-Urinary ROS: no dysuria, trouble voiding, or hematuria  Musculoskeletal ROS: negative  Neurological ROS: no TIA or stroke symptoms  Dermatological ROS: negative      Blood pressure (!) 169/88, pulse 59, height 5' 3\" (1.6 m), weight (!) 307 lb 3.2 oz (139.3 kg), not currently breastfeeding.         Physical Examination:    General appearance - alert, well appearing, and in no distress  Mental status - alert, oriented to person, place, and time  Neck - supple, no significant adenopathy, no JVD, or carotid bruits  Chest - clear to auscultation, no wheezes, rales or rhonchi, symmetric air entry  Heart - normal rate, regular rhythm, normal S1, S2, no murmurs, rubs, clicks or gallops  Abdomen - soft, nontender, nondistended, no masses or organomegaly  Neurological - alert, oriented, normal speech, no focal findings or movement disorder noted  Musculoskeletal - no joint tenderness, deformity or swelling  Extremities - peripheral pulses normal, no pedal edema, no clubbing or cyanosis  Skin - normal coloration and turgor, no rashes, no suspicious skin lesions noted    Lab  No results for input(s): CKTOTAL, CKMB, CKMBINDEX, TROPONINI in the last 72 hours.   CBC:   Lab Results   Component Value Date    WBC 9.3 03/11/2020    RBC 4.46 03/11/2020    HGB 13.3 03/11/2020    HCT 40.2 03/11/2020    MCV 90.1 03/11/2020    MCH 29.9 03/11/2020    MCHC 33.2 03/11/2020    RDW 14.0 03/11/2020     03/11/2020    MPV 8.0 03/11/2020     BMP:    Lab Results   Component Value Date     09/13/2021    K 4.4 09/13/2021     09/13/2021    CO2 24 09/13/2021    BUN 16 09/13/2021    LABALBU 4.1 09/13/2021    CREATININE 0.7 09/13/2021    CALCIUM 9.7 09/13/2021    GFRAA >60 09/13/2021    LABGLOM >60 09/13/2021    LABGLOM 87 04/27/2019    GLUCOSE 95 09/13/2021     Hepatic Function Panel:    Lab Results   Component Value Date    ALKPHOS 137 09/13/2021    ALT 13 09/13/2021    AST 20 09/13/2021    PROT 6.9 09/13/2021    PROT 6.9 09/13/2021    BILITOT 0.6 09/13/2021    LABALBU 4.1 09/13/2021     Magnesium:    Lab Results   Component Value Date    MG 2.0 04/27/2019     Warfarin PT/INR:  No components found for: PTPATWAR, PTINRWAR  HgBA1c:  No results found for: LABA1C  FLP:    Lab Results   Component Value Date    TRIG 150 09/13/2021    HDL 59 09/13/2021    [address] 163 03/11/2020    LDLDIRECT 168 09/13/2021    LABVLDL 29 03/11/2020     TSH:    Lab Results   Component Value Date    TSH 2.920 09/15/2018     Sinus tachycardia 109/min  Low voltage QRS, consider pulmonary disease, pericardial effusion, or normal variant  Borderline ECG  No previous ECGs available  Confirmed by Sofie Staley MD, Lloyd Barrett ([id number]) on 9/16/2018 6:57:51 PM         CONCLUSION:  This is a normal sinus rhythm. Average heart rate 82 beats  per minute, ranging from 49 to 150 beats per minute. No significant pause  of more than 1.5 seconds noted. Rare ventricular ectopic beats are  isolated and couplets total of 24. Rare supraventricular ectopic beats,  total of 134 isolated and one supraventricular ectopic run composed of 3  beats, rate 168 beats per minute. No other form of arrhythmia. Overall,  this is a benign Holter monitor finding. Diary was presented. This Holter  does not explain the palpitation of the patient.     DEEJAY Staley M.D.     D: 09/24/2018 18:39:40       EKG5/2/19  NSR, no acute abn      The patient's symptoms of  palpitation correlated with the isolated ventricular ectopy. Otherwise,  there was no sustained arrhythmia, pretty much patient almost recorded  every single isolated PVC.     CONCLUSION:  1. Sinus rhythm. 2.  The patient's symptoms correlated very nicely with isolated PVCs and  sometimes PACs. Otherwise, there was no sustained arrhythmias.  Peña Palacios M.D.     D: 06/03/2019 7:08:10         ekg 12/7/21  Sinus  Bradycardia   Low voltage in precordial leads.    - Extensive anterior-lateral infarct  Old.    -  Nonspecific T-abnormality. 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 Essential hypertension  EKG 12 Lead   2.  Intermittent palpitations     3. 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 Obesity, morbid ([facility]           Plan   The current meds and labs  Reviewed    Continue the current treatment and with constant vigilance to changes in symptoms and also any potential side effects. Return for care or seek medical attention immediately if symptoms got worse and/or develop new symptoms. Palpitations- occasional  Holter is okay  nuc stress and  Echo- WNL   avoided pseudofed tab and get Rx for allergic rehnitis/sinusitis    Palpitation mostly short lasting  Event monitor- oKAY - RARE pacs AND PvcS  hX SUGGEST PSVT  LOPRESSOR 25 po bid  FOR NOW PAT WANT CONSERVATIVE MED rx   RECONSIDER eps IF get  RECURRENT    D/w the pat plan of care    Hypertension, on medical treatment. Seems to be under good control. Patient is compliant with medical treatment. del [place]man /80     Stable and doing well    Discussed use, benefit, and side effects of prescribed medications. All patient questions answered. Pt voiced understanding. Instructed to continue current medications, diet and exercise. Continue risk factor modification and medical management. Patient agreed with treatment plan. Follow up as directed.       RTC in   1 year    Heriberto Mcbride, [facility]

## 2021-12-21 ENCOUNTER — OFFICE VISIT (OUTPATIENT)
Dept: ORTHOPEDIC SURGERY | Age: 56
End: 2021-12-21

## 2021-12-21 ENCOUNTER — OFFICE VISIT (OUTPATIENT)
Dept: FAMILY MEDICINE CLINIC | Age: 56
End: 2021-12-21

## 2021-12-21 VITALS
SYSTOLIC BLOOD PRESSURE: 132 MMHG | RESPIRATION RATE: 16 BRPM | HEIGHT: 63 IN | DIASTOLIC BLOOD PRESSURE: 79 MMHG | HEART RATE: 65 BPM | WEIGHT: 293 LBS | BODY MASS INDEX: 51.91 KG/M2 | OXYGEN SATURATION: 97 %

## 2021-12-21 VITALS
RESPIRATION RATE: 16 BRPM | HEIGHT: 63 IN | HEART RATE: 60 BPM | WEIGHT: 293 LBS | BODY MASS INDEX: 51.91 KG/M2 | OXYGEN SATURATION: 99 %

## 2021-12-21 DIAGNOSIS — I10 ESSENTIAL HYPERTENSION: Primary | ICD-10-CM

## 2021-12-21 DIAGNOSIS — Z09 POSTOP CHECK: Primary | ICD-10-CM

## 2021-12-21 DIAGNOSIS — G56.03 BILATERAL CARPAL TUNNEL SYNDROME: ICD-10-CM

## 2021-12-21 DIAGNOSIS — R60.9 PERIPHERAL EDEMA: ICD-10-CM

## 2021-12-21 DIAGNOSIS — M25.552 PAIN OF BOTH HIP JOINTS: ICD-10-CM

## 2021-12-21 DIAGNOSIS — K21.9 GASTROESOPHAGEAL REFLUX DISEASE, UNSPECIFIED WHETHER ESOPHAGITIS PRESENT: ICD-10-CM

## 2021-12-21 DIAGNOSIS — M25.551 PAIN OF BOTH HIP JOINTS: ICD-10-CM

## 2021-12-21 PROCEDURE — 99214 OFFICE O/P EST MOD 30 MIN: CPT | Performed by: STUDENT IN AN ORGANIZED HEALTH CARE EDUCATION/TRAINING PROGRAM

## 2021-12-21 PROCEDURE — 99024 POSTOP FOLLOW-UP VISIT: CPT | Performed by: STUDENT IN AN ORGANIZED HEALTH CARE EDUCATION/TRAINING PROGRAM

## 2021-12-21 RX ORDER — LISINOPRIL 20 MG/1
20 TABLET ORAL NIGHTLY
Qty: 90 TABLET | Refills: 1 | Status: SHIPPED | OUTPATIENT
Start: 2021-12-21 | End: 2022-03-21 | Stop reason: SDUPTHER

## 2021-12-21 RX ORDER — OMEPRAZOLE 20 MG/1
20 CAPSULE, DELAYED RELEASE ORAL
Qty: 90 CAPSULE | Refills: 1 | Status: SHIPPED | OUTPATIENT
Start: 2021-12-21 | End: 2022-06-21 | Stop reason: SDUPTHER

## 2021-12-21 RX ORDER — NAPROXEN 500 MG/1
500 TABLET ORAL 2 TIMES DAILY
Qty: 180 TABLET | Refills: 1 | Status: SHIPPED | OUTPATIENT
Start: 2021-12-21 | End: 2022-03-21 | Stop reason: SDUPTHER

## 2021-12-21 RX ORDER — FUROSEMIDE 20 MG/1
20 TABLET ORAL DAILY
Qty: 30 TABLET | Refills: 0 | Status: SHIPPED | OUTPATIENT
Start: 2021-12-21 | End: 2022-03-30

## 2021-12-21 RX ORDER — GABAPENTIN 300 MG/1
300 CAPSULE ORAL NIGHTLY
Qty: 30 CAPSULE | Refills: 5 | Status: SHIPPED | OUTPATIENT
Start: 2021-12-21 | End: 2022-06-21 | Stop reason: SDUPTHER

## 2021-12-21 ASSESSMENT — ENCOUNTER SYMPTOMS
SHORTNESS OF BREATH: 0
SORE THROAT: 0
WHEEZING: 0
NAUSEA: 0
ABDOMINAL PAIN: 0

## 2021-12-21 NOTE — PROGRESS NOTES
Ramya Gottron is a 61 y.o. female returning to the office for a post op check of a left hand CTR from 11/08/21

## 2021-12-21 NOTE — PATIENT INSTRUCTIONS
Weightbearing and activities as tolerated  May take Ibuprofen or Motrin as needed  Rest, ice, and elevate as needed  Work on ROM and strengthening exercises as discussed  Follow up in 6 weeks    Patient Education        Hand Arthritis: Exercises  Introduction  Here are some examples of exercises for you to try. The exercises may be suggested for a condition or for rehabilitation. Start each exercise slowly. Ease off the exercises if you start to have pain. You will be told when to start these exercises and which ones will work best for you. How to do the exercises  Tendon glides    1. In this exercise, the steps follow one another to a make a continuous movement. 2. With your affected hand, point your fingers and thumb straight up. Your wrist should be relaxed, following the line of your fingers and thumb. 3. Curl your fingers so that the top two joints in them are bent, and your fingers wrap down. Your fingertips should touch or be near the base of your fingers. Your fingers will look like a hook. 4. Make a fist by bending your knuckles. Your thumb can gently rest against your index (pointing) finger. 5. Unwind your fingers slightly so that your fingertips can touch the base of your palm. Your thumb can rest against your index finger. 6. Move back to your starting position, with your fingers and thumb pointing up. 7. Repeat the series of motions 8 to 12 times. 8. Switch hands and repeat steps 1 through 6, even if only one hand is sore. Intrinsic flexion    1. Rest your affected hand on a table and bend the large joints where your fingers connect to your hand. Keep your thumb and the other joints in your fingers straight. 2. Slowly straighten your fingers. Your wrist should be relaxed, following the line of your fingers and thumb. 3. Move back to your starting position, with your hand bent. 4. Repeat 8 to 12 times. 5. Switch hands and repeat steps 1 through 4, even if only one hand is sore.   Finger extension    1. Place your affected hand flat on a table. 2. Lift and then lower one finger at a time off the table. 3. Repeat 8 to 12 times. 4. Switch hands and repeat steps 1 through 3, even if only one hand is sore. MP extension    1. Place your good hand on a table, palm up. Put your affected hand on top of your good hand with your fingers wrapped around the thumb of your good hand like you are making a fist.  2. Slowly uncurl the joints of your affected hand where your fingers connect to your hand so that only the top two joints of your fingers are bent. Your fingers will look like a hook. 3. Move back to your starting position, with your fingers wrapped around your good thumb. 4. Repeat 8 to 12 times. 5. Switch hands and repeat steps 1 through 4, even if only one hand is sore. PIP extension (with MP extension)    1. Place your good hand on a table, palm up. Put your affected hand on top of your good hand, palm up. 2. Use the thumb and fingers of your good hand to grasp below the middle joint of one finger of your affected hand. 3. Straighten the last two joints of that finger. 4. Repeat 8 to 12 times. 5. Repeat steps 1 through 4 with each finger. 6. Switch hands and repeat steps 1 through 5, even if only one hand is sore. DIP flexion    1. With your good hand, grasp one finger of your affected hand. Your thumb will be on the top side of your finger just below the joint that is closest to your fingernail. 2. Slowly bend your affected finger only at the joint closest to your fingernail. 3. Repeat 8 to 12 times. 4. Repeat steps 1 through 3 with each finger. 5. Switch hands and repeat steps 1 through 4, even if only one hand is sore. Follow-up care is a key part of your treatment and safety. Be sure to make and go to all appointments, and call your doctor if you are having problems. It's also a good idea to know your test results and keep a list of the medicines you take.   Where can you learn more?  Go to [email or web address] and sign in to your [email or web address] account. Enter Z165 in the [facility] box to learn more about \"Hand Arthritis: Exercises. \"     If you do not have an account, please click on the \"Sign Up Now\" link. Current as of: July 1, 2021               Content Version: 13.0  © 2006-2021 [employer]. Care instructions adapted under license by [facility] ([facility]). If you have questions about a medical condition or this instruction, always ask your healthcare professional. [place] [address] any warranty or liability for your use of this information. Patient Education        Anterior Cruciate Ligament (ACL): Rehab Exercises  Introduction  Here are some examples of exercises for you to try. The exercises may be suggested for a condition or for rehabilitation. Start each exercise slowly. Ease off the exercises if you start to have pain. You will be told when to start these exercises and which ones will work best for you. How to do the exercises  Quad sets    1. Sit with your leg straight and supported on the floor or a firm bed. (If you feel discomfort in the front or back of your knee, place a small towel roll under your knee.)  2. Tighten the muscles on top of your thigh by pressing the back of your knee flat down to the floor. (If you feel discomfort under your kneecap, place a small towel roll under your knee.)  3. Hold for about 6 seconds, then rest for up to 10 seconds. 4. Do 8 to 12 repetitions several times a day. Straight-leg raises to the front    1. Lie on your back with your good knee bent so that your foot rests flat on the floor. Your injured leg should be straight. Make sure that your low back has a normal curve. You should be able to slip your flat hand in between the floor and the small of your back, with your palm touching the floor and your back touching the back of your hand.   2. Tighten the thigh muscles in the injured leg by pressing the back of your knee flat down to the floor. Hold your knee straight. 3. Keeping the thigh muscles tight, lift your injured leg up so that your heel is about 12 inches off the floor. Hold for about 6 seconds, and then lower slowly. 4. Do 8 to 12 repetitions, 3 times a day. Straight-leg raises to the outside    1. Lie on your side, with your injured leg on top. 2. Tighten the front thigh muscles of your injured leg to keep your knee straight. 3. Keep your hip and your leg straight in line with the rest of your body, and keep your knee pointing forward. Do not drop your hip back. 4. Lift your injured leg straight up toward the ceiling, about 12 inches off the floor. Hold for about 6 seconds, then slowly lower your leg. 5. Do 8 to 12 repetitions. Heel dig bridging    1. Lie on your back with both knees bent and your ankles bent so that only your heels are digging into the floor. Your knees should be bent about 90 degrees. 2. Then push your heels into the floor, squeeze your buttocks, and lift your hips off the floor until your shoulders, hips, and knees are all in a straight line. 3. Hold for about 6 seconds as you continue to breathe normally, and then slowly lower your hips back down to the floor and rest for up to 10 seconds. 4. Do 8 to 12 repetitions. Hamstring curls    1. Lie on your stomach with your knees straight. If your kneecap is uncomfortable, roll up a washcloth and put it under your leg just above your kneecap. 2. Lift the foot of your injured leg by bending the knee so that you bring the foot up toward your buttock. If this motion hurts, try it without bending your knee quite as far. This may help you avoid any painful motion. 3. Slowly lower your leg back to the floor. 4. Do 8 to 12 repetitions.   5. With permission from your doctor or physical therapist, you may also want to add a cuff weight to your ankle (not more than 5 pounds) or use soup cans in a plastic bag liability for your use of this information.

## 2021-12-21 NOTE — PROGRESS NOTES
Date of surgery:   11-8-2021     Procedure:  Left open carpal tunnel release      History:  Patient is here in follow up regarding their 6-week postop left open carpal tunnel release    Patient is doing well. They have 0/10 pain. They deny chest pain, SOB, calf pain,fever,wound drainage. No other issues. Patient states they have been compliant with restrictions. No new complaints. Mild relief of her carpal tunnel symptoms since the surgery. No concerns for infection     Physical:   Patient demonstrates appropriate mood and affect. Left upper extremity exam:   The incisions are clean, dry, intact and nontender with no erythema. They have no edema, the Arm compartments are soft . There are No cords or calf tenderness. No significant calf/ankle edema. They are neurovascularly intact distally. ROM of the wrist and fingers full and intact without pain. .    Imaging:   No imaging     Impression: Status post above, doing well        Plan:   -Treatment course reviewed with the patient  -Encouraged to do range of motion exercises  -rest/elevation, ice as needed  -No restrictions  -f/u in 6 week(s)  -Will discuss L CTR at a later date  -Achilles stretching, hand HEP provided  -f/u sooner prn any issues

## 2021-12-21 NOTE — PROGRESS NOTES
1/7/2022    Khushboo Sour    Chief Complaint   Patient presents with    3 Month Follow-Up     Presenting for three month follow up visit.  Joint Swelling     Bilateral ankles swelling, chronic , worse recently. HPI  History was obtained from patient. Ashley is a 61 y.o. female with a PMHx as listed below who presents today for 3 month follow up on chronic conditions. No acute complaints. Compliant with medications. Now s/p left carpal release. 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 Essential hypertension    2. Gastroesophageal reflux disease, unspecified whether esophagitis present    3. Bilateral carpal tunnel syndrome    4. Peripheral edema    5. Pain of both hip joints             REVIEW OF SYMPTOMS    Review of Systems   Constitutional: Negative for chills and fatigue. HENT: Negative for congestion and sore throat. Respiratory: Negative for shortness of breath and wheezing. Cardiovascular: Negative for chest pain and palpitations. Gastrointestinal: Negative for abdominal pain and nausea. Genitourinary: Negative for frequency and urgency. Neurological: Negative for light-headedness.        PAST MEDICAL HISTORY  Past Medical History:   Diagnosis Date    Acid reflux     Anxiety     Arthritis     \"Hands And Hips\"    Bronchitis Last Episode 2-18    Depression     Environmental allergies     Granulomatous hepatitis Dx 5-96    Heart palpitations     Hyperlipidemia     Hypertension     Pneumonia Dx 4-17    Shortness of breath on exertion     Teeth missing     Upper And Lower    Wears glasses     [place] teeth extracted     4 [place] Teeth Extracted In Past       FAMILY HISTORY  Family History   Problem Relation Age of Onset    Allergy (Severe) Mother     Other Mother         Fibromyalgia    High Blood Pressure Mother     Diabetes Father         \"Pre Diabetic\"    Alcohol Abuse Father         \"Recovered Alcoholic\"    Substance Abuse Father         \"Recovered Alcoholic\"    Cancer Maternal Grandfather         prostate    Cancer Paternal Grandfather         liver    Hypertension Other         grandmother    Diabetes Other         grandmother       SOCIAL HISTORY  Social History     Socioeconomic History    Marital status:      Spouse name: None    Number of children: None    Years of education: None    Highest education level: None   Occupational History    None   Tobacco Use    Smoking status: Never Smoker    Smokeless tobacco: Never Used   Vaping Use    Vaping Use: Never used   Substance and Sexual Activity    Alcohol use: Yes     Comment: 1 - 2 mixed drinks a week    Drug use: No    Sexual activity: Yes     Partners: Male   Other Topics Concern    None   Social History Narrative    None     Social Determinants of Health     Financial Resource Strain:     Difficulty of Paying Living Expenses: Not on file   Food Insecurity:     Worried About Running Out of Food in the Last Year: Not on file    Winter of Food in the Last Year: Not on file   Transportation Needs:     Lack of Transportation (Medical): Not on file    Lack of Transportation (Non-Medical):  Not on file   Physical Activity:     Days of Exercise per Week: Not on file    Minutes of Exercise per Session: Not on file   Stress:     Feeling of Stress : Not on file   Social Connections:     Frequency of Communication with Friends and Family: Not on file    Frequency of Social Gatherings with Friends and Family: Not on file    Attends Jainism Services: Not on file    Active Member of Clubs or Organizations: Not on file    Attends Club or Organization Meetings: Not on file    Marital Status: Not on file   Intimate Partner Violence:     Fear of Current or Ex-Partner: Not on file    Emotionally Abused: Not on file    Physically Abused: Not on file    Sexually Abused: Not on file   Housing Stability:     Unable to Pay for Housing in the Last Year: Not on file    Number of Places Lived in the Last Year: Not on file    Unstable Housing in the Last Year: Not on file        SURGICAL HISTORY  Past Surgical History:   Procedure Laterality Date    CARPAL TUNNEL RELEASE Left 11/8/2021    LEFT CARPAL TUNNEL RELEASE performed by Jocelyn Bradley DO at [address]  10/16/1987    CHOLECYSTECTOMY, LAPAROSCOPIC  05/1996    ENDOSCOPY, COLON, DIAGNOSTIC  05/1996    SINUS ENDOSCOPY Bilateral 12/13/2018    SINUS ENDOSCOPY FUNCTIONAL SURGERY BILATERAL MAXILLARY ANSTROSTOMY WITH TISSUE REMOVAL performed by Heidi Chamberlain MD at [address]  [facility] Bilateral 12/13/2018    SUBMUCOUS RESECTION TURBINOPLASTY  BILATERAL performed by Heidi Chamberlain MD at [address]      4 [place] Teeth Extracted In Past                 CURRENT MEDICATIONS  Current Outpatient Medications   Medication Sig Dispense Refill    lisinopril (PRINIVIL;ZESTRIL) 20 MG tablet Take 1 tablet by mouth nightly 90 tablet 1    omeprazole (PRILOSEC) 20 MG delayed release capsule Take 1 capsule by mouth every morning (before breakfast) 90 capsule 1    naproxen (NAPROSYN) 500 MG tablet Take 1 tablet by mouth 2 times daily 180 tablet 1    gabapentin (NEURONTIN) 300 MG capsule Take 1 capsule by mouth nightly for 30 days. Intended supply: 30 days 30 capsule 5    metoprolol tartrate (LOPRESSOR) 25 MG tablet Take 1 tablet by mouth twice daily 180 tablet 3    furosemide (LASIX) 20 MG tablet Take 1 tablet by mouth daily 30 tablet 0    GuaiFENesin (MUCINEX PO) Take 1,200 mg by mouth 2 times daily Over The Counter, \"I Take A Regular One In The Morning, I Take DM At Night\"      ondansetron (ZOFRAN) 4 MG tablet Take 1 tablet by mouth 3 times daily as needed for Nausea or Vomiting (Patient not taking: Reported on 12/21/2021) 15 tablet 0     No current facility-administered medications for this visit.        ALLERGIES  Allergies   Allergen Reactions    Ampicillin Anaphylaxis    Bactrim [Sulfamethoxazole-Trimethoprim] Anaphylaxis    Flagyl [Metronidazole]      \"Welts\"    Tape Margretta Sciara Tape]      \"Tears The Skin\"       PHYSICAL EXAM    /79   Pulse 65   Resp 16   Ht 5' 3\" (1.6 m)   Wt (!) 309 lb (140.2 kg)   SpO2 97%   BMI 54.74 kg/m²     Physical Exam  Constitutional:       Appearance: Normal appearance. HENT:      Head: Normocephalic and atraumatic. Eyes:      Extraocular Movements: Extraocular movements intact. Pupils: Pupils are equal, round, and reactive to light. Cardiovascular:      Rate and Rhythm: Normal rate and regular rhythm. Pulses: Normal pulses. Heart sounds: No murmur heard. No friction rub. No gallop. Skin:     General: Skin is warm and dry. Neurological:      General: No focal deficit present. Mental Status: She is alert. Psychiatric:         Mood and Affect: Mood normal.         Behavior: Behavior normal.       1+ pitting edema up to mid shin    ASSESSMENT & PLAN    1. Essential hypertension  -bp stable on current regeimen  - lisinopril (PRINIVIL;ZESTRIL) 20 MG tablet; Take 1 tablet by mouth nightly  Dispense: 90 tablet; Refill: 1  - metoprolol tartrate (LOPRESSOR) 25 MG tablet; Take 1 tablet by mouth twice daily  Dispense: 180 tablet; Refill: 3    2. Gastroesophageal reflux disease, unspecified whether esophagitis present  -start priolosec for gerd  - omeprazole (PRILOSEC) 20 MG delayed release capsule; Take 1 capsule by mouth every morning (before breakfast)  Dispense: 90 capsule; Refill: 1    3. Bilateral carpal tunnel syndrome  -improved s/p release, continue gabapentin no signs of misuse  - omeprazole (PRILOSEC) 20 MG delayed release capsule; Take 1 capsule by mouth every morning (before breakfast)  Dispense: 90 capsule; Refill: 1  - gabapentin (NEURONTIN) 300 MG capsule; Take 1 capsule by mouth nightly for 30 days. Intended supply: 30 days  Dispense: 30 capsule; Refill: 5    4.  Peripheral edema  Start lasix, instructed to wear compression  - furosemide (LASIX) 20 MG tablet; Take 1 tablet by mouth daily  Dispense: 30 tablet; Refill: 0    5. Pain of both hip joints  -stable  - naproxen (NAPROSYN) 500 MG tablet; Take 1 tablet by mouth 2 times daily  Dispense: 180 tablet; Refill: 1    GERD, Naprosyn  Start lasix for peripheral edema    Return in about 3 months (around 3/21/2022).          Electronically signed by Terry Nguyen DO on 1/7/2022

## 2022-01-20 PROBLEM — Z09 POSTOP CHECK: Status: RESOLVED | Noted: 2021-12-21 | Resolved: 2022-01-20

## 2022-01-21 ENCOUNTER — PATIENT MESSAGE (OUTPATIENT)
Dept: FAMILY MEDICINE CLINIC | Age: 57
End: 2022-01-21

## 2022-01-21 DIAGNOSIS — M54.50 CHRONIC LOW BACK PAIN WITHOUT SCIATICA, UNSPECIFIED BACK PAIN LATERALITY: ICD-10-CM

## 2022-01-21 DIAGNOSIS — G89.29 CHRONIC LOW BACK PAIN WITHOUT SCIATICA, UNSPECIFIED BACK PAIN LATERALITY: ICD-10-CM

## 2022-01-21 NOTE — TELEPHONE ENCOUNTER
From: Minus Grow  To: Dr. Fry Economy: 1/21/2022 9:33 AM EST  Subject: refill    May I have a refill on tramadol please/thanks! !  Roosevelt Trammell

## 2022-01-23 RX ORDER — TRAMADOL HYDROCHLORIDE 50 MG/1
50 TABLET ORAL EVERY 6 HOURS PRN
Qty: 60 TABLET | Refills: 0 | Status: SHIPPED | OUTPATIENT
Start: 2022-01-23 | End: 2022-02-22

## 2022-02-01 ENCOUNTER — OFFICE VISIT (OUTPATIENT)
Dept: ORTHOPEDIC SURGERY | Age: 57
End: 2022-02-01

## 2022-02-01 VITALS
HEIGHT: 63 IN | BODY MASS INDEX: 51.91 KG/M2 | OXYGEN SATURATION: 98 % | RESPIRATION RATE: 16 BRPM | HEART RATE: 72 BPM | WEIGHT: 293 LBS

## 2022-02-01 DIAGNOSIS — M25.532 LEFT WRIST PAIN: Primary | ICD-10-CM

## 2022-02-01 PROCEDURE — 99024 POSTOP FOLLOW-UP VISIT: CPT | Performed by: STUDENT IN AN ORGANIZED HEALTH CARE EDUCATION/TRAINING PROGRAM

## 2022-02-01 RX ORDER — MULTIVITAMIN WITH IRON
TABLET ORAL
COMMUNITY
Start: 2021-09-27

## 2022-02-01 NOTE — PROGRESS NOTES
Date of surgery:   11-8-2021     Procedure:  Left open carpal tunnel release      History:  Patient is here in follow up regarding their 12-week postop left open carpal tunnel release    Patient is doing well. They have 2/10 pain. They deny chest pain, SOB, calf pain,fever,wound drainage. No other issues. Patient states they have been compliant with restrictions. No new complaints. Significant relief of her carpal tunnel symptoms since the surgery but does continue with some stiffness throughout the wrist that she states is mildly improved since her last visit    No concerns for infection     Physical:   Patient demonstrates appropriate mood and affect. Left upper extremity exam:   The incisions are clean, dry, intact and nontender with no erythema. They have no edema, the Arm compartments are soft . There are No cords or calf tenderness. No significant calf/ankle edema. They are neurovascularly intact distally. ROM of the wrist and fingers full and intact without pain. .    Imaging:   No imaging     Impression: Status post above, doing well        Plan:   -Treatment course reviewed with the patient  -Encouraged to continue range of motion exercises  -Prescription for occupational therapy provided  -rest/elevation, ice as needed  -No restrictions  -f/u in 8 week(s)  -Will discuss L CTR at a later date  -Achilles stretching encouraged. 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 Will follow up with my partner Dr. Cristel Osuna in office. Made a referral today.   -f/u sooner prn any issues

## 2022-02-01 NOTE — PROGRESS NOTES
Patient comes in today for a follow up of left carpal tunnel release on 11/8/21. She rates her pain at 2-3/10 currently. She still has issues with supination and turning door knobs, also with end range flexion and extension. She has been taking Tylenol for the wrist pain. Patient states that numbness and tingling is 99% gone. She denies any new falls or injuries. Dominant hand: right  Occupation: traveling nurse.

## 2022-02-01 NOTE — PATIENT INSTRUCTIONS
OT ordered  Work on ROM and strengthening exercises per OT protocol  Rest, ice, and elevate  Weightbearing as tolerated  Follow up in 2 months    [address], [facility], [address]  Phone: [phone]

## 2022-03-03 ENCOUNTER — OFFICE VISIT (OUTPATIENT)
Dept: ORTHOPEDIC SURGERY | Age: 57
End: 2022-03-03
Payer: COMMERCIAL

## 2022-03-03 VITALS
OXYGEN SATURATION: 98 % | HEIGHT: 63 IN | WEIGHT: 293 LBS | RESPIRATION RATE: 15 BRPM | BODY MASS INDEX: 51.91 KG/M2 | HEART RATE: 77 BPM

## 2022-03-03 DIAGNOSIS — M76.61 RIGHT ACHILLES TENDINITIS: Primary | ICD-10-CM

## 2022-03-03 PROCEDURE — 99214 OFFICE O/P EST MOD 30 MIN: CPT | Performed by: ORTHOPAEDIC SURGERY

## 2022-03-03 RX ORDER — TRAMADOL HYDROCHLORIDE 50 MG/1
TABLET ORAL
COMMUNITY
End: 2022-03-21 | Stop reason: SDUPTHER

## 2022-03-03 RX ORDER — MULTIVIT WITH MINERALS/LUTEIN
TABLET ORAL
COMMUNITY
Start: 2020-05-01

## 2022-03-03 NOTE — PATIENT INSTRUCTIONS
Continue weight-bearing as tolerated   Continue range of motion exercises as instructed. Ice and elevate as needed. Tylenol or Motrin for pain.   Fitted for walking boot as needed  Physical therapy ordered today   Follow up in 6 weeks

## 2022-03-03 NOTE — PROGRESS NOTES
Patient presents to the office today for evaluation of the right achilles pain. Pt states pain has been ongoing for about a year now. Pt states she pain is along the achilles area. Pt states pain will increase with prolong walking or standing. She has tried different shoes with no relief. Pt states she does take naproxen and rest will help. Pt denies any injury.

## 2022-03-04 ASSESSMENT — ENCOUNTER SYMPTOMS
SHORTNESS OF BREATH: 0
EYE PAIN: 0
WHEEZING: 0
COLOR CHANGE: 0
CHEST TIGHTNESS: 0
VOMITING: 0
EYE REDNESS: 0

## 2022-03-04 NOTE — PROGRESS NOTES
3/3/2022   Chief Complaint   Patient presents with    Ankle Pain     right achilles pain         History of Present Illness:                             Abdirashid Altman is a 61 y.o. female who presents today for evaluation of right posterior ankle pain. She has had discomfort and pain along the Achilles tendon for about a year. It has gotten worse over the last few months. She has noticed fullness and prominence in the area of the mid aspect of her Achilles tendon which is worse with pushoff activities and direct pressure over the area. She denies any significant falls or injuries. She has increased pain with prolonged standing and walking. She has tried wearing supportive shoes and rest and anti-inflammatory medications with no significant improvement. Patient presents to the office today for evaluation of the right achilles pain. Pt states pain has been ongoing for about a year now. Pt states she pain is along the achilles area. Pt states pain will increase with prolong walking or standing. She has tried different shoes with no relief. Pt states she does take naproxen and rest will help. Pt denies any injury. Medical History  Patient's medications, allergies, past medical, surgical, social and family histories were reviewed and updated as appropriate.     Past Medical History:   Diagnosis Date    Acid reflux     Anxiety     Arthritis     \"Hands And Hips\"    Bronchitis Last Episode 2-18    Depression     Environmental allergies     Granulomatous hepatitis Dx 5-96    Heart palpitations     Hyperlipidemia     Hypertension     Pneumonia Dx 4-17    Shortness of breath on exertion     Teeth missing     Upper And Lower    Wears glasses     [place] teeth extracted     4 [place] Teeth Extracted In Past     Past Surgical History:   Procedure Laterality Date    CARPAL TUNNEL RELEASE Left 11/8/2021    LEFT CARPAL TUNNEL RELEASE performed by Heather Allison DO at [address] SECTION  10/16/1987    CHOLECYSTECTOMY, LAPAROSCOPIC  05/1996    ENDOSCOPY, COLON, DIAGNOSTIC  05/1996    SINUS ENDOSCOPY Bilateral 12/13/2018    SINUS ENDOSCOPY FUNCTIONAL SURGERY BILATERAL MAXILLARY ANSTROSTOMY WITH TISSUE REMOVAL performed by Luis Bowles MD at [address] Bilateral 12/13/2018    SUBMUCOUS RESECTION TURBINOPLASTY  BILATERAL performed by Luis Bowles MD at [address] EXTRACTION      4 [place] Teeth Extracted In Past     Family History   Problem Relation Age of Onset    Allergy (Severe) Mother     Other Mother         Fibromyalgia    High Blood Pressure Mother     Diabetes Father         \"Pre Diabetic\"    Alcohol Abuse Father         \"Recovered Alcoholic\"   [place] Substance Abuse Father         \"Recovered Alcoholic\"   [place] Cancer Maternal Grandfather         prostate    Cancer Paternal Grandfather         liver    Hypertension Other         grandmother    Diabetes Other         grandmother     Social History     Socioeconomic History    Marital status:      Spouse name: Not on file    Number of children: Not on file    Years of education: Not on file    Highest education level: Not on file   Occupational History    Not on file   Tobacco Use    Smoking status: Never Smoker    Smokeless tobacco: Never Used   Vaping Use    Vaping Use: Never used   Substance and Sexual Activity    Alcohol use: Yes     Comment: 1 - 2 mixed drinks a week    Drug use: No    Sexual activity: Yes     Partners: Male   Other Topics Concern    Not on file   Social History Narrative    Not on file     Social Determinants of Health     Financial Resource Strain:     Difficulty of Paying Living Expenses: Not on file   Food Insecurity:     Worried About Running Out of Food in the Last Year: Not on file    Winter of Food in the Last Year: Not on file   Transportation Needs:     Lack of Transportation (Medical): Not on file    Lack of Transportation (Non-Medical):  Not on file   Physical Activity:     Days of Exercise per Week: Not on file    Minutes of Exercise per Session: Not on file   Stress:     Feeling of Stress : Not on file   Social Connections:     Frequency of Communication with Friends and Family: Not on file    Frequency of Social Gatherings with Friends and Family: Not on file    Attends Nondenominational Services: Not on file    Active Member of [address] [place] [address] or Organizations: Not on file    Attends Club or Organization Meetings: Not on file    Marital Status: Not on file   Intimate Partner Violence:     Fear of Current or Ex-Partner: Not on file    Emotionally Abused: Not on file    Physically Abused: Not on file    Sexually Abused: Not on file   Housing Stability:     Unable to Pay for Housing in the Last Year: Not on file    Number of [place] in the Last Year: Not on file    Unstable Housing in the Last Year: Not on file     Current Outpatient Medications   Medication Sig Dispense Refill    Ascorbic Acid (VITAMIN C) 1000 MG tablet       traMADol (ULTRAM) 50 MG tablet       Cholecalciferol (VITAMIN D3) 1.25 MG (58194 UT) TABS       vitamin B-6 (PYRIDOXINE) 100 MG tablet       lisinopril (PRINIVIL;ZESTRIL) 20 MG tablet Take 1 tablet by mouth nightly 90 tablet 1    omeprazole (PRILOSEC) 20 MG delayed release capsule Take 1 capsule by mouth every morning (before breakfast) 90 capsule 1    naproxen (NAPROSYN) 500 MG tablet Take 1 tablet by mouth 2 times daily 180 tablet 1    gabapentin (NEURONTIN) 300 MG capsule Take 1 capsule by mouth nightly for 30 days.  Intended supply: 30 days 30 capsule 5    metoprolol tartrate (LOPRESSOR) 25 MG tablet Take 1 tablet by mouth twice daily 180 tablet 3    furosemide (LASIX) 20 MG tablet Take 1 tablet by mouth daily 30 tablet 0    ondansetron (ZOFRAN) 4 MG tablet Take 1 tablet by mouth 3 times daily as needed for Nausea or Vomiting (Patient not taking: Reported on 12/21/2021) 15 tablet 0    GuaiFENesin (MUCINEX PO) Take 1,200 mg by mouth 2 times daily Over The Counter, \"I Take A Regular One In The Morning, I Take DM At Night\"       No current facility-administered medications for this visit. Allergies   Allergen Reactions    Ampicillin Anaphylaxis    Bactrim [Sulfamethoxazole-Trimethoprim] Anaphylaxis    Flagyl [Metronidazole]      \"Welts\"    Tape [place] Tape]      \"Tears The Skin\"         Review of Systems   Constitutional: Negative for chills and fever. HENT: Negative for congestion and sneezing. Eyes: Negative for pain and redness. Respiratory: Negative for chest tightness, shortness of breath and wheezing. Cardiovascular: Negative for chest pain and palpitations. Gastrointestinal: Negative for vomiting. Musculoskeletal: Positive for arthralgias. Skin: Negative for color change and rash. Neurological: Negative for weakness and numbness. Psychiatric/Behavioral: Negative for agitation. The patient is not nervous/anxious. Examination:  General Exam:  Vitals: Pulse 77   Resp 15   Ht 5' 3\" (1.6 m)   Wt (!) 301 lb (136.5 kg)   SpO2 98%   BMI 53.32 kg/m²    Physical Exam  Vitals and nursing note reviewed. Constitutional:       Appearance: Normal appearance. HENT:      Head: Normocephalic and atraumatic. Eyes:      Conjunctiva/sclera: Conjunctivae normal.      Pupils: Pupils are equal, round, and reactive to light. Pulmonary:      Effort: Pulmonary effort is normal.   Musculoskeletal:      Cervical back: Normal range of motion. Right knee: No swelling, deformity or lacerations. Normal range of motion. No tenderness. Left ankle: No swelling, deformity, ecchymosis or lacerations. No tenderness. Normal range of motion. Normal pulse.       Left Achilles Tendon: Normal.      Comments: Right lower extremity:  There is severe tenderness to palpation over prominent areas mid aspect of the Achilles tendon with a firm nodule adjacent to the posterior aspect of the Achilles tendons. No tenderness to palpation of the gastroc medius musculature. No tenderness at the calcaneal tuberosity. No tenderness palpation of the malleoli or anterior ankle. Active range of motion of the ankle is mildly limited and painful at terminal dorsiflexion. Strength is 5 out of 5 with ankle dorsiflexion plantarflexion but limited due to pain referred to the nodular area of the Achilles tendon. No instability of the ankle with stress examination. Sensation and motor function is intact throughout the foot. Foot is warm and well-perfused with 2+ DP pulse and brisk cap refill present. Skin:     General: Skin is warm and dry. Neurological:      Mental Status: She is alert and oriented to person, place, and time. Psychiatric:         Mood and Affect: Mood normal.         Behavior: Behavior normal.            Diagnostic testing:  X-ray images were reviewed by myself and discussed with the patient:       Office Procedures:  No orders of the defined types were placed in this encounter. Assessment and Plan  1. Right Achilles tendinitis    I reassured the patient that do not appreciate any high-grade disruption of her Achilles tendon. We discussed the fullness of the tendon which is consistent with repetitive strain. I recommended we proceed with conservative measures at this stage. If she fails conservative treatments we may need an MRI to better evaluate the status of the tendon. I recommended use of a walking boot for severe symptoms in order to allow the tendon to rest and heal.  She feels comfortable she can wean out of the boot. I provided her with a walking boot today. I instructed her on the importance of wearing supportive shoes for mandatory activities. We discussed the importance of weight loss to decrease forces and stress across the ankle.     I recommended stretching exercises and generalities of the home program.  Additionally have ordered physical therapy to help with rehabilitation of the foot and ankle. Continue with anti-inflammatory medications. Follow-up in 6 weeks for check of her response to this treatment plan.     Electronically signed by Jt Hatch MD on 3/4/2022 at 9:14 AM

## 2022-03-21 ENCOUNTER — TELEMEDICINE (OUTPATIENT)
Dept: FAMILY MEDICINE CLINIC | Age: 57
End: 2022-03-21
Payer: COMMERCIAL

## 2022-03-21 DIAGNOSIS — M54.50 CHRONIC LOW BACK PAIN WITHOUT SCIATICA, UNSPECIFIED BACK PAIN LATERALITY: ICD-10-CM

## 2022-03-21 DIAGNOSIS — I10 ESSENTIAL HYPERTENSION: ICD-10-CM

## 2022-03-21 DIAGNOSIS — M25.551 PAIN OF BOTH HIP JOINTS: ICD-10-CM

## 2022-03-21 DIAGNOSIS — G56.03 BILATERAL CARPAL TUNNEL SYNDROME: Primary | ICD-10-CM

## 2022-03-21 DIAGNOSIS — G89.29 CHRONIC LOW BACK PAIN WITHOUT SCIATICA, UNSPECIFIED BACK PAIN LATERALITY: ICD-10-CM

## 2022-03-21 DIAGNOSIS — M25.552 PAIN OF BOTH HIP JOINTS: ICD-10-CM

## 2022-03-21 PROCEDURE — 99213 OFFICE O/P EST LOW 20 MIN: CPT | Performed by: STUDENT IN AN ORGANIZED HEALTH CARE EDUCATION/TRAINING PROGRAM

## 2022-03-21 RX ORDER — NAPROXEN 500 MG/1
500 TABLET ORAL 2 TIMES DAILY
Qty: 180 TABLET | Refills: 1 | Status: SHIPPED | OUTPATIENT
Start: 2022-03-21 | End: 2022-09-22 | Stop reason: SDUPTHER

## 2022-03-21 RX ORDER — LISINOPRIL 20 MG/1
20 TABLET ORAL NIGHTLY
Qty: 90 TABLET | Refills: 1 | Status: SHIPPED | OUTPATIENT
Start: 2022-03-21 | End: 2022-09-22 | Stop reason: SDUPTHER

## 2022-03-21 RX ORDER — TRAMADOL HYDROCHLORIDE 50 MG/1
50 TABLET ORAL 2 TIMES DAILY PRN
Qty: 60 TABLET | Refills: 0 | Status: SHIPPED | OUTPATIENT
Start: 2022-03-21 | End: 2022-06-15 | Stop reason: SDUPTHER

## 2022-03-21 ASSESSMENT — ENCOUNTER SYMPTOMS
NAUSEA: 0
WHEEZING: 0
SHORTNESS OF BREATH: 0
SORE THROAT: 0
ABDOMINAL PAIN: 0

## 2022-03-21 NOTE — PROGRESS NOTES
Trisha Cortez (:  1965) is a Established patient, here for evaluation of the following:    Assessment & Plan   Below is the assessment and plan developed based on review of pertinent history, physical exam, labs, studies, and medications. 1. Essential hypertension  The following orders have not been finalized:  -     lisinopril (PRINIVIL;ZESTRIL) 20 MG tablet  2. Pain of both hip joints  The following orders have not been finalized:  -     naproxen (NAPROSYN) 500 MG tablet    -htn stable on current regimen  -conservative management hip pain    No follow-ups on file. Subjective   HPI      62 yo F presents via virtual visit. No acute complaints. Patient likely has chronic achilles tendon sprain right. Following with Ortho plan is to have PT     Pain stable on current regimen gabapentin/tramadol  Patient is considering whether to have surgery on right hand. Patient had peripheral edema last visit she uses on occasion but edema is much improved. GERD       Review of Systems   Constitutional: Negative for chills and fatigue. HENT: Negative for congestion and sore throat. Respiratory: Negative for shortness of breath and wheezing. Cardiovascular: Negative for chest pain and palpitations. Gastrointestinal: Negative for abdominal pain and nausea. Genitourinary: Negative for frequency and urgency. Neurological: Negative for light-headedness.           Objective   Patient-Reported Vitals  Patient-Reported Pulse: 67  Patient-Reported Weight: 295 lb  Patient-Reported Height: 5 3\"  Patient-Reported Pulse Oximetry: 97       Physical Exam    Constitutional: [x] Appears well-developed and well-nourished [x] No apparent distress      [] Abnormal -     Mental status: [x] Alert and awake  [x] Oriented to person/place/time [x] Able to follow commands    [] Abnormal -     Eyes:   EOM    [x]  Normal    [] Abnormal -   Sclera  [x]  Normal    [] Abnormal -          Discharge [x]  None visible   []

## 2022-06-15 ENCOUNTER — PATIENT MESSAGE (OUTPATIENT)
Dept: FAMILY MEDICINE CLINIC | Age: 57
End: 2022-06-15

## 2022-06-15 DIAGNOSIS — M25.552 PAIN OF BOTH HIP JOINTS: ICD-10-CM

## 2022-06-15 DIAGNOSIS — M25.551 PAIN OF BOTH HIP JOINTS: ICD-10-CM

## 2022-06-15 DIAGNOSIS — G89.29 CHRONIC LOW BACK PAIN WITHOUT SCIATICA, UNSPECIFIED BACK PAIN LATERALITY: ICD-10-CM

## 2022-06-15 DIAGNOSIS — M54.50 CHRONIC LOW BACK PAIN WITHOUT SCIATICA, UNSPECIFIED BACK PAIN LATERALITY: ICD-10-CM

## 2022-06-15 RX ORDER — TRAMADOL HYDROCHLORIDE 50 MG/1
50 TABLET ORAL 2 TIMES DAILY PRN
Qty: 60 TABLET | Refills: 0 | Status: SHIPPED | OUTPATIENT
Start: 2022-06-15 | End: 2022-08-25 | Stop reason: SDUPTHER

## 2022-06-16 ENCOUNTER — TELEPHONE (OUTPATIENT)
Dept: FAMILY MEDICINE CLINIC | Age: 57
End: 2022-06-16

## 2022-06-16 NOTE — TELEPHONE ENCOUNTER
Pharmacist notified patient has chronic arthritis. They wanted more information as they said her dx was vague.

## 2022-06-21 ENCOUNTER — OFFICE VISIT (OUTPATIENT)
Dept: FAMILY MEDICINE CLINIC | Age: 57
End: 2022-06-21

## 2022-06-21 VITALS
HEIGHT: 63 IN | OXYGEN SATURATION: 98 % | SYSTOLIC BLOOD PRESSURE: 130 MMHG | DIASTOLIC BLOOD PRESSURE: 74 MMHG | WEIGHT: 293 LBS | HEART RATE: 60 BPM | RESPIRATION RATE: 16 BRPM | BODY MASS INDEX: 51.91 KG/M2

## 2022-06-21 DIAGNOSIS — G56.03 BILATERAL CARPAL TUNNEL SYNDROME: ICD-10-CM

## 2022-06-21 DIAGNOSIS — I10 ESSENTIAL HYPERTENSION: ICD-10-CM

## 2022-06-21 DIAGNOSIS — K21.9 GASTROESOPHAGEAL REFLUX DISEASE, UNSPECIFIED WHETHER ESOPHAGITIS PRESENT: ICD-10-CM

## 2022-06-21 DIAGNOSIS — E78.49 OTHER HYPERLIPIDEMIA: ICD-10-CM

## 2022-06-21 DIAGNOSIS — M77.51 TENDONITIS OF ANKLE, RIGHT: ICD-10-CM

## 2022-06-21 DIAGNOSIS — K04.7 DENTAL INFECTION: Primary | ICD-10-CM

## 2022-06-21 PROCEDURE — 99213 OFFICE O/P EST LOW 20 MIN: CPT | Performed by: STUDENT IN AN ORGANIZED HEALTH CARE EDUCATION/TRAINING PROGRAM

## 2022-06-21 RX ORDER — OMEPRAZOLE 20 MG/1
20 CAPSULE, DELAYED RELEASE ORAL
Qty: 90 CAPSULE | Refills: 1 | Status: SHIPPED | OUTPATIENT
Start: 2022-06-21 | End: 2022-09-22 | Stop reason: SDUPTHER

## 2022-06-21 RX ORDER — GABAPENTIN 300 MG/1
300 CAPSULE ORAL NIGHTLY
Qty: 30 CAPSULE | Refills: 5 | Status: SHIPPED | OUTPATIENT
Start: 2022-06-21 | End: 2022-09-22

## 2022-06-21 RX ORDER — CLINDAMYCIN HYDROCHLORIDE 300 MG/1
300 CAPSULE ORAL 3 TIMES DAILY
Qty: 30 CAPSULE | Refills: 0 | Status: SHIPPED | OUTPATIENT
Start: 2022-06-21 | End: 2022-07-01

## 2022-06-21 ASSESSMENT — ENCOUNTER SYMPTOMS
SORE THROAT: 0
NAUSEA: 0
SHORTNESS OF BREATH: 0
ABDOMINAL PAIN: 0
WHEEZING: 0

## 2022-06-21 NOTE — PROGRESS NOTES
6/21/2022    Eliodoro Nageotte    Chief Complaint   Patient presents with    3 Month Follow-Up     Presenting for three month follow up visit. HPI  History was obtained from patient. Corrie Yun is a 61 y.o. female with a PMHx as listed below who presents today for     PT was set up however loss of grand son she did not follow. 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 Dental infection    2. Essential hypertension    3. Tendonitis of ankle, right    4. Bilateral carpal tunnel syndrome    5. Gastroesophageal reflux disease, unspecified whether esophagitis present    6. Other hyperlipidemia             REVIEW OF SYMPTOMS    Review of Systems   Constitutional: Negative for chills and fatigue. HENT: Negative for congestion and sore throat. Respiratory: Negative for shortness of breath and wheezing. Cardiovascular: Negative for chest pain and palpitations. Gastrointestinal: Negative for abdominal pain and nausea. Genitourinary: Negative for frequency and urgency. Neurological: Negative for light-headedness.        PAST MEDICAL HISTORY  Past Medical History:   Diagnosis Date    Acid reflux     Anxiety     Arthritis     \"Hands And Hips\"    Bronchitis Last Episode 2-18    Depression     Environmental allergies     Granulomatous hepatitis Dx 5-96    Heart palpitations     Hyperlipidemia     Hypertension     Pneumonia Dx 4-17    Shortness of breath on exertion     Teeth missing     Upper And Lower    Wears glasses     [place] teeth extracted     4 [place] Teeth Extracted In Past       FAMILY HISTORY  Family History   Problem Relation Age of Onset    Allergy (Severe) Mother     Other Mother         Fibromyalgia    High Blood Pressure Mother     Diabetes Father         \"Pre Diabetic\"    Alcohol Abuse Father         \"Recovered Alcoholic\"    Substance Abuse Father         \"Recovered Alcoholic\"    Cancer Maternal Grandfather         prostate    Cancer Paternal Grandfather         liver    Hypertension Other         grandmother    Diabetes Other         grandmother       SOCIAL HISTORY  Social History     Socioeconomic History    Marital status:      Spouse name: Not on file    Number of children: Not on file    Years of education: Not on file    Highest education level: Not on file   Occupational History    Not on file   Tobacco Use    Smoking status: Never Smoker    Smokeless tobacco: Never Used   Vaping Use    Vaping Use: Never used   Substance and Sexual Activity    Alcohol use: Yes     Comment: 1 - 2 mixed drinks a week    Drug use: No    Sexual activity: Yes     Partners: Male   Other Topics Concern    Not on file   Social History Narrative    Not on file     Social Determinants of Health     Financial Resource Strain:     Difficulty of Paying Living Expenses: Not on file   Food Insecurity:     Worried About Running Out of Food in the Last Year: Not on file    Winter of Food in the Last Year: Not on file   Transportation Needs:     Lack of Transportation (Medical): Not on file    Lack of Transportation (Non-Medical):  Not on file   Physical Activity:     Days of Exercise per Week: Not on file    Minutes of Exercise per Session: Not on file   Stress:     Feeling of Stress : Not on file   Social Connections:     Frequency of Communication with Friends and Family: Not on file    Frequency of Social Gatherings with Friends and Family: Not on file    Attends Congregational Services: Not on file    Active Member of Clubs or Organizations: Not on file    Attends Club or Organization Meetings: Not on file    Marital Status: Not on file   Intimate Partner Violence:     Fear of Current or Ex-Partner: Not on file    Emotionally Abused: Not on file    Physically Abused: Not on file    Sexually Abused: Not on file   Housing Stability:     Unable to Pay for Housing in the Last Year: Not on file    Number of Places Lived in the Last Year: Not on file    Unstable Housing in the Last Year: Not on file        SURGICAL HISTORY  Past Surgical History:   Procedure Laterality Date   [address] Left 11/8/2021    LEFT CARPAL TUNNEL RELEASE performed by Ansel Saint, DO at [address]  10/16/1987    CHOLECYSTECTOMY, LAPAROSCOPIC  05/1996    ENDOSCOPY, COLON, DIAGNOSTIC  05/1996    SINUS ENDOSCOPY Bilateral 12/13/2018    SINUS ENDOSCOPY FUNCTIONAL SURGERY BILATERAL MAXILLARY ANSTROSTOMY WITH TISSUE REMOVAL performed by Evan Felix MD at [address] Bilateral 12/13/2018    SUBMUCOUS RESECTION TURBINOPLASTY  BILATERAL performed by Evan Felix MD at [address]      4 [place] Teeth Extracted In Past                 CURRENT MEDICATIONS  Current Outpatient Medications   Medication Sig Dispense Refill    gabapentin (NEURONTIN) 300 MG capsule Take 1 capsule by mouth nightly for 30 days. Intended supply: 30 days 30 capsule 5    omeprazole (PRILOSEC) 20 MG delayed release capsule Take 1 capsule by mouth every morning (before breakfast) 90 capsule 1    clindamycin (CLEOCIN) 300 MG capsule Take 1 capsule by mouth 3 times daily for 10 days 30 capsule 0    traMADol (ULTRAM) 50 MG tablet Take 1 tablet by mouth 2 times daily as needed for Pain for up to 30 days.  60 tablet 0    lisinopril (PRINIVIL;ZESTRIL) 20 MG tablet Take 1 tablet by mouth nightly 90 tablet 1    naproxen (NAPROSYN) 500 MG tablet Take 1 tablet by mouth 2 times daily 180 tablet 1    Ascorbic Acid (VITAMIN C) 1000 MG tablet       Cholecalciferol (VITAMIN D3) 1.25 MG (86692 UT) TABS       vitamin B-6 (PYRIDOXINE) 100 MG tablet       metoprolol tartrate (LOPRESSOR) 25 MG tablet Take 1 tablet by mouth twice daily 180 tablet 3    GuaiFENesin (MUCINEX PO) Take 1,200 mg by mouth 2 times daily Over The Counter, \"I Take A Regular One In The Morning, I Take DM At Night\"       No current facility-administered medications for this visit. ALLERGIES  Allergies   Allergen Reactions    Ampicillin Anaphylaxis    Bactrim [Sulfamethoxazole-Trimethoprim] Anaphylaxis    Flagyl [Metronidazole]      \"Welts\"    Tape Elige Lathe Tape]      \"Tears The Skin\"       PHYSICAL EXAM    /74   Pulse 60   Resp 16   Ht 5' 3\" (1.6 m)   Wt 297 lb (134.7 kg)   SpO2 98%   BMI 52.61 kg/m²     Physical Exam  Constitutional:       Appearance: Normal appearance. HENT:      Head: Normocephalic and atraumatic. Nose: Nose normal.      Mouth/Throat:      Mouth: Mucous membranes are moist.      Pharynx: No oropharyngeal exudate or posterior oropharyngeal erythema. Eyes:      Extraocular Movements: Extraocular movements intact. Pupils: Pupils are equal, round, and reactive to light. Cardiovascular:      Rate and Rhythm: Normal rate and regular rhythm. Pulses: Normal pulses. Heart sounds: No murmur heard. No friction rub. No gallop. Pulmonary:      Effort: Pulmonary effort is normal.      Breath sounds: Normal breath sounds. Musculoskeletal:      Cervical back: Neck supple. Skin:     General: Skin is warm and dry. Neurological:      General: No focal deficit present. Mental Status: She is alert. Psychiatric:         Mood and Affect: Mood normal.         Behavior: Behavior normal.         ASSESSMENT & PLAN    1. Dental infection    - clindamycin (CLEOCIN) 300 MG capsule; Take 1 capsule by mouth 3 times daily for 10 days  Dispense: 30 capsule; Refill: 0    2. Essential hypertension      3. Tendonitis of ankle, right      4. Bilateral carpal tunnel syndrome    - gabapentin (NEURONTIN) 300 MG capsule; Take 1 capsule by mouth nightly for 30 days. Intended supply: 30 days  Dispense: 30 capsule;  Refill: 5  - omeprazole (PRILOSEC) 20 MG delayed release capsule; Take 1 capsule by mouth every morning (before breakfast)  Dispense: 90 capsule; Refill: 1    5. Gastroesophageal reflux disease, unspecified whether esophagitis present    - omeprazole (PRILOSEC) 20 MG delayed release capsule; Take 1 capsule by mouth every morning (before breakfast)  Dispense: 90 capsule; Refill: 1    6. Other hyperlipidemia      BP stable  Pain stable on current regimen  Given abx for gum infection discussed to follow up dentistry  Following with Ortho plan for PT consider further imaging if conservative managemetn fails    Return in about 3 months (around 9/21/2022).          Electronically signed by Trudy Bruno DO on 6/21/2022

## 2022-08-25 DIAGNOSIS — M54.50 CHRONIC LOW BACK PAIN WITHOUT SCIATICA, UNSPECIFIED BACK PAIN LATERALITY: ICD-10-CM

## 2022-08-25 DIAGNOSIS — M25.552 PAIN OF BOTH HIP JOINTS: ICD-10-CM

## 2022-08-25 DIAGNOSIS — G89.29 CHRONIC LOW BACK PAIN WITHOUT SCIATICA, UNSPECIFIED BACK PAIN LATERALITY: ICD-10-CM

## 2022-08-25 DIAGNOSIS — M25.551 PAIN OF BOTH HIP JOINTS: ICD-10-CM

## 2022-08-25 RX ORDER — TRAMADOL HYDROCHLORIDE 50 MG/1
50 TABLET ORAL 2 TIMES DAILY PRN
Qty: 60 TABLET | Refills: 0 | Status: SHIPPED | OUTPATIENT
Start: 2022-08-25 | End: 2022-10-31 | Stop reason: SDUPTHER

## 2022-09-22 ENCOUNTER — COMMUNITY OUTREACH (OUTPATIENT)
Dept: FAMILY MEDICINE CLINIC | Age: 57
End: 2022-09-22

## 2022-09-22 ENCOUNTER — TELEMEDICINE (OUTPATIENT)
Dept: FAMILY MEDICINE CLINIC | Age: 57
End: 2022-09-22

## 2022-09-22 DIAGNOSIS — K21.9 GASTROESOPHAGEAL REFLUX DISEASE, UNSPECIFIED WHETHER ESOPHAGITIS PRESENT: ICD-10-CM

## 2022-09-22 DIAGNOSIS — G56.03 BILATERAL CARPAL TUNNEL SYNDROME: ICD-10-CM

## 2022-09-22 DIAGNOSIS — M25.551 PAIN OF BOTH HIP JOINTS: ICD-10-CM

## 2022-09-22 DIAGNOSIS — M25.552 PAIN OF BOTH HIP JOINTS: ICD-10-CM

## 2022-09-22 DIAGNOSIS — M76.61 RIGHT ACHILLES TENDINITIS: Primary | ICD-10-CM

## 2022-09-22 DIAGNOSIS — I10 ESSENTIAL HYPERTENSION: ICD-10-CM

## 2022-09-22 PROCEDURE — 99213 OFFICE O/P EST LOW 20 MIN: CPT | Performed by: STUDENT IN AN ORGANIZED HEALTH CARE EDUCATION/TRAINING PROGRAM

## 2022-09-22 RX ORDER — OMEPRAZOLE 20 MG/1
20 CAPSULE, DELAYED RELEASE ORAL
Qty: 90 CAPSULE | Refills: 1 | Status: SHIPPED | OUTPATIENT
Start: 2022-09-22

## 2022-09-22 RX ORDER — LISINOPRIL 20 MG/1
20 TABLET ORAL NIGHTLY
Qty: 90 TABLET | Refills: 1 | Status: SHIPPED | OUTPATIENT
Start: 2022-09-22

## 2022-09-22 RX ORDER — NAPROXEN 500 MG/1
500 TABLET ORAL 2 TIMES DAILY
Qty: 180 TABLET | Refills: 1 | Status: SHIPPED | OUTPATIENT
Start: 2022-09-22

## 2022-09-22 ASSESSMENT — PATIENT HEALTH QUESTIONNAIRE - PHQ9
2. FEELING DOWN, DEPRESSED OR HOPELESS: 1
5. POOR APPETITE OR OVEREATING: 0
6. FEELING BAD ABOUT YOURSELF - OR THAT YOU ARE A FAILURE OR HAVE LET YOURSELF OR YOUR FAMILY DOWN: 0
10. IF YOU CHECKED OFF ANY PROBLEMS, HOW DIFFICULT HAVE THESE PROBLEMS MADE IT FOR YOU TO DO YOUR WORK, TAKE CARE OF THINGS AT HOME, OR GET ALONG WITH OTHER PEOPLE: 0
9. THOUGHTS THAT YOU WOULD BE BETTER OFF DEAD, OR OF HURTING YOURSELF: 0
SUM OF ALL RESPONSES TO PHQ9 QUESTIONS 1 & 2: 1
1. LITTLE INTEREST OR PLEASURE IN DOING THINGS: 0
3. TROUBLE FALLING OR STAYING ASLEEP: 0
4. FEELING TIRED OR HAVING LITTLE ENERGY: 0
SUM OF ALL RESPONSES TO PHQ QUESTIONS 1-9: 1
7. TROUBLE CONCENTRATING ON THINGS, SUCH AS READING THE NEWSPAPER OR WATCHING TELEVISION: 0
SUM OF ALL RESPONSES TO PHQ QUESTIONS 1-9: 1
SUM OF ALL RESPONSES TO PHQ QUESTIONS 1-9: 1
8. MOVING OR SPEAKING SO SLOWLY THAT OTHER PEOPLE COULD HAVE NOTICED. OR THE OPPOSITE, BEING SO FIGETY OR RESTLESS THAT YOU HAVE BEEN MOVING AROUND A LOT MORE THAN USUAL: 0
SUM OF ALL RESPONSES TO PHQ QUESTIONS 1-9: 1

## 2022-09-22 ASSESSMENT — ENCOUNTER SYMPTOMS
ABDOMINAL PAIN: 0
SHORTNESS OF BREATH: 0
WHEEZING: 0
NAUSEA: 0
SORE THROAT: 0

## 2022-09-22 NOTE — PROGRESS NOTES
Una Bailey (:  1965) is a Established patient, here for evaluation of the following:    Assessment & Plan   Below is the assessment and plan developed based on review of pertinent history, physical exam, labs, studies, and medications. 1. Right Achilles tendinitis  2. Bilateral carpal tunnel syndrome  -     omeprazole (PRILOSEC) 20 MG delayed release capsule; Take 1 capsule by mouth every morning (before breakfast), Disp-90 capsule, R-1Normal  3. Gastroesophageal reflux disease, unspecified whether esophagitis present  -     omeprazole (PRILOSEC) 20 MG delayed release capsule; Take 1 capsule by mouth every morning (before breakfast), Disp-90 capsule, R-1Normal  4. Essential hypertension  -     lisinopril (PRINIVIL;ZESTRIL) 20 MG tablet; Take 1 tablet by mouth nightly, Disp-90 tablet, R-1Please place on fileNormal  5. Pain of both hip joints  -     naproxen (NAPROSYN) 500 MG tablet; Take 1 tablet by mouth 2 times daily, Disp-180 tablet, R-1Please place on fileNormal    Continue PT pain stable on current rgimen, bp stable  Gerd stable  Nsaids prn    No follow-ups on file. Subjective   HPI    60 yo presents for follow up on chronic conditions. No acute complaints. Continues to follow with ortho, pt. Currently going through PT    Review of Systems   Constitutional:  Negative for chills and fatigue. HENT:  Negative for congestion and sore throat. Respiratory:  Negative for shortness of breath and wheezing. Cardiovascular:  Negative for chest pain and palpitations. Gastrointestinal:  Negative for abdominal pain and nausea. Genitourinary:  Negative for frequency and urgency. Musculoskeletal:  Positive for arthralgias. Neurological:  Negative for light-headedness.         Objective   Patient-Reported Vitals  No data recorded     Physical Exam    Constitutional: [x] Appears well-developed and well-nourished [x] No apparent distress      [] Abnormal -     Mental status: [x] Alert and awake [x] Oriented to person/place/time [x] Able to follow commands    [] Abnormal -     Eyes:   EOM    [x]  Normal    [] Abnormal -   Sclera  [x]  Normal    [] Abnormal -          Discharge [x]  None visible   [] Abnormal -     HENT: [x] Normocephalic, atraumatic  [] Abnormal -   [x] Mouth/Throat: Mucous membranes are moist    External Ears [x] Normal  [] Abnormal -    Neck: [x] No visualized mass [] Abnormal -     Pulmonary/Chest: [x] Respiratory effort normal   [x] No visualized signs of difficulty breathing or respiratory distress        [] Abnormal -      Musculoskeletal:   [x] Normal gait with no signs of ataxia         [x] Normal range of motion of neck        [] Abnormal -     Neurological:        [x] No Facial Asymmetry (Cranial nerve 7 motor function) (limited exam due to video visit)          [x] No gaze palsy        [] Abnormal -          Skin:        [x] No significant exanthematous lesions or discoloration noted on facial skin         [] Abnormal -            Psychiatric:       [x] Normal Affect [] Abnormal -        [x] No Hallucinations    Other pertinent observable physical exam findings:-         On this date 9/22/2022 I have spent 25 minutes reviewing previous notes, test results and face to face (virtual) with the patient discussing the diagnosis and importance of compliance with the treatment plan as well as documenting on the day of the visitJohn Huia Leo, was evaluated through a synchronous (real-time) audio-video encounter. The patient (or guardian if applicable) is aware that this is a billable service, which includes applicable co-pays. This Virtual Visit was conducted with patient's (and/or legal guardian's) consent. The visit was conducted pursuant to the emergency declaration under the [facility], [address] [place] [address] authority and the [employer] Act.   Patient identification was verified, and a caregiver was present when appropriate. The patient was located at Home: [address] [place], [place] [id number]. Provider was located at [facility] ([address] [place] [address]): [address] [facility]. [address].         --Devon Coates, DO

## 2022-09-22 NOTE — PROGRESS NOTES
Patient's HM shows they are overdue for Mammogram, Colorectal Screening and Cervical Cancer Screening. [employer] and  files searched. No results to attach to order nor HM updated.

## 2022-10-31 ENCOUNTER — PATIENT MESSAGE (OUTPATIENT)
Dept: FAMILY MEDICINE CLINIC | Age: 57
End: 2022-10-31

## 2022-10-31 ENCOUNTER — TELEPHONE (OUTPATIENT)
Dept: FAMILY MEDICINE CLINIC | Age: 57
End: 2022-10-31

## 2022-10-31 DIAGNOSIS — M25.552 PAIN OF BOTH HIP JOINTS: ICD-10-CM

## 2022-10-31 DIAGNOSIS — M54.50 CHRONIC LOW BACK PAIN WITHOUT SCIATICA, UNSPECIFIED BACK PAIN LATERALITY: ICD-10-CM

## 2022-10-31 DIAGNOSIS — M25.551 PAIN OF BOTH HIP JOINTS: ICD-10-CM

## 2022-10-31 DIAGNOSIS — G89.29 CHRONIC LOW BACK PAIN WITHOUT SCIATICA, UNSPECIFIED BACK PAIN LATERALITY: ICD-10-CM

## 2022-10-31 RX ORDER — TRAMADOL HYDROCHLORIDE 50 MG/1
50 TABLET ORAL 2 TIMES DAILY PRN
Qty: 60 TABLET | Refills: 0 | Status: SHIPPED | OUTPATIENT
Start: 2022-10-31 | End: 2022-11-30

## 2022-10-31 NOTE — TELEPHONE ENCOUNTER
From: Rodolfo Flores  To: Dr. Gregg Cordoba: 10/31/2022 6:46 AM EDT  Subject: Refill    May I please have a refill on my Tramadol? Thank you!

## 2022-11-22 ENCOUNTER — OFFICE VISIT (OUTPATIENT)
Dept: FAMILY MEDICINE CLINIC | Age: 57
End: 2022-11-22

## 2022-11-22 VITALS
HEART RATE: 73 BPM | DIASTOLIC BLOOD PRESSURE: 69 MMHG | HEIGHT: 63 IN | WEIGHT: 286 LBS | SYSTOLIC BLOOD PRESSURE: 127 MMHG | BODY MASS INDEX: 50.68 KG/M2 | RESPIRATION RATE: 16 BRPM | OXYGEN SATURATION: 98 %

## 2022-11-22 DIAGNOSIS — H66.012 ACUTE SUPPURATIVE OTITIS MEDIA OF LEFT EAR WITH SPONTANEOUS RUPTURE OF TYMPANIC MEMBRANE, RECURRENCE NOT SPECIFIED: ICD-10-CM

## 2022-11-22 DIAGNOSIS — M75.01 ADHESIVE CAPSULITIS OF RIGHT SHOULDER: Primary | ICD-10-CM

## 2022-11-22 PROCEDURE — 20610 DRAIN/INJ JOINT/BURSA W/O US: CPT | Performed by: PHYSICIAN ASSISTANT

## 2022-11-22 PROCEDURE — 3078F DIAST BP <80 MM HG: CPT | Performed by: PHYSICIAN ASSISTANT

## 2022-11-22 PROCEDURE — 3074F SYST BP LT 130 MM HG: CPT | Performed by: PHYSICIAN ASSISTANT

## 2022-11-22 PROCEDURE — 99213 OFFICE O/P EST LOW 20 MIN: CPT | Performed by: PHYSICIAN ASSISTANT

## 2022-11-22 RX ORDER — METHYLPREDNISOLONE ACETATE 40 MG/ML
40 INJECTION, SUSPENSION INTRA-ARTICULAR; INTRALESIONAL; INTRAMUSCULAR; SOFT TISSUE ONCE
Status: COMPLETED | OUTPATIENT
Start: 2022-11-22 | End: 2022-11-22

## 2022-11-22 RX ADMIN — METHYLPREDNISOLONE ACETATE 40 MG: 40 INJECTION, SUSPENSION INTRA-ARTICULAR; INTRALESIONAL; INTRAMUSCULAR; SOFT TISSUE at 16:33

## 2022-11-22 NOTE — PROGRESS NOTES
11/22/2022    Rodolfo Flores    Chief Complaint   Patient presents with    Arm Pain     Presenting for right arm pain. Ear Drainage     Purulent drainage left ear. Still draining today per patient. Went to Urgent care. She is on Cefdinir. Otalgia     Had mild grade temp over weekend. Most recent 99.0. HPI  History was obtained from pt. Deya Cee is a 61 y.o. female with a PMHx as listed below who presents today for evaluation of right arm pain. Pt wants left ear rechecked, is on cefdinir for ear infection, pt is not sure if it is improving. Pt was having significant pain, then woke at night and was having clear drainage, pain improved but hearing decreased. The discharge has slowed down and gotten thick and purulent. Right arm pain began a few months ago. 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 Now she cant rotate her arm and cant brush her hair. She has had a steroid shot in her shoulder from Dr. Angela Graves. 1. Adhesive capsulitis of right shoulder    2.  Acute suppurative otitis media of left ear with spontaneous rupture of tympanic membrane, recurrence not specified         REVIEW OF SYMPTOMS    Review of Systems    PAST MEDICAL HISTORY  Past Medical History:   Diagnosis Date    Acid reflux     Anxiety     Arthritis     \"Hands And Hips\"    Bronchitis Last Episode 2-18    Depression     Environmental allergies     Granulomatous hepatitis Dx 5-96    Heart palpitations     Hyperlipidemia     Hypertension     Pneumonia Dx 4-17    Shortness of breath on exertion     Teeth missing     Upper And Lower    Wears glasses     [place] teeth extracted     4 [place] Teeth Extracted In Past       FAMILY HISTORY  Family History   Problem Relation Age of Onset    Allergy (Severe) Mother     Other Mother         Fibromyalgia    High Blood Pressure Mother     Diabetes Father         \"Pre Diabetic\"    Alcohol Abuse Father         \"Recovered Alcoholic\"    Substance Abuse Father         \"Recovered Alcoholic\"    Cancer Maternal Grandfather         prostate    Cancer Paternal Grandfather         liver    Hypertension Other         grandmother    Diabetes Other         grandmother       SOCIAL HISTORY  Social History     Socioeconomic History    Marital status:      Spouse name: None    Number of children: None    Years of education: None    Highest education level: None   Tobacco Use    Smoking status: Never    Smokeless tobacco: Never   Vaping Use    Vaping Use: Never used   Substance and Sexual Activity    Alcohol use: Yes     Comment: 1 - 2 mixed drinks a week    Drug use: No    Sexual activity: Yes     Partners: Male        SURGICAL HISTORY  Past Surgical History:   Procedure Laterality Date    CARPAL TUNNEL RELEASE Left 11/8/2021    LEFT CARPAL TUNNEL RELEASE performed by Bozena Villanueva DO at [facility]  10/16/1987    CHOLECYSTECTOMY, LAPAROSCOPIC  05/1996    ENDOSCOPY, COLON, DIAGNOSTIC  05/1996    SINUS ENDOSCOPY Bilateral 12/13/2018    SINUS ENDOSCOPY FUNCTIONAL SURGERY BILATERAL MAXILLARY ANSTROSTOMY WITH TISSUE REMOVAL performed by Felicita Marquez MD at [facility] Bilateral 12/13/2018    SUBMUCOUS RESECTION TURBINOPLASTY  BILATERAL performed by Felicita Marquez MD at [address] [place] [address] Rd      4 [place] Teeth Extracted In Past                 CURRENT MEDICATIONS  Current Outpatient Medications   Medication Sig Dispense Refill    traMADol (ULTRAM) 50 MG tablet Take 1 tablet by mouth 2 times daily as needed for Pain for up to 30 days.  60 tablet 0    omeprazole (PRILOSEC) 20 MG delayed release capsule Take 1 capsule by mouth every morning (before breakfast) 90 capsule 1    lisinopril (PRINIVIL;ZESTRIL) 20 MG tablet Take 1 tablet by mouth nightly 90 tablet 1    naproxen (NAPROSYN) 500 MG tablet Take 1 tablet by mouth 2 times daily 180 tablet 1    gabapentin (NEURONTIN) 300 MG capsule Take 1 capsule by mouth nightly for 30 days. Intended supply: 30 days 30 capsule 5    Ascorbic Acid (VITAMIN C) 1000 MG tablet       Cholecalciferol (VITAMIN D3) 1.25 MG (64192 UT) TABS       vitamin B-6 (PYRIDOXINE) 100 MG tablet       metoprolol tartrate (LOPRESSOR) 25 MG tablet Take 1 tablet by mouth twice daily 180 tablet 3    GuaiFENesin (MUCINEX PO) Take 1,200 mg by mouth 2 times daily Over The Counter, \"I Take A Regular One In The Morning, I Take DM At Night\"       No current facility-administered medications for this visit. ALLERGIES  Allergies   Allergen Reactions    Ampicillin Anaphylaxis    Bactrim [Sulfamethoxazole-Trimethoprim] Anaphylaxis    Flagyl [Metronidazole]      \"Welts\"    Tape [place] Vikki Tape]      \"Tears The Skin\"       PHYSICAL EXAM    /69   Pulse 73   Resp 16   Ht 5' 3\" (1.6 m)   Wt 286 lb (129.7 kg)   SpO2 98%   BMI 50.66 kg/m²     Physical Exam  Constitutional:       Appearance: Normal appearance. She is obese. HENT:      Head: Normocephalic and atraumatic. Right Ear: Tympanic membrane and external ear normal.      Left Ear: External ear normal. Decreased hearing noted. Drainage present. A middle ear effusion is present. Tympanic membrane is perforated. Nose: No rhinorrhea. Mouth/Throat:      Mouth: Mucous membranes are moist.      Pharynx: Oropharynx is clear. No oropharyngeal exudate or posterior oropharyngeal erythema. Eyes:      General: No scleral icterus. Extraocular Movements: Extraocular movements intact. Conjunctiva/sclera: Conjunctivae normal.      Pupils: Pupils are equal, round, and reactive to light. Cardiovascular:      Rate and Rhythm: Normal rate and regular rhythm. Pulses: Normal pulses. Heart sounds: Normal heart sounds. No murmur heard. No friction rub. No gallop. Pulmonary:      Effort: Pulmonary effort is normal.      Breath sounds: Normal breath sounds. No wheezing, rhonchi or rales.    Abdominal:      General: Bowel sounds are normal. There is no distension. Palpations: Abdomen is soft. There is no mass. Tenderness: There is no abdominal tenderness. There is no right CVA tenderness, left CVA tenderness, guarding or rebound. Musculoskeletal:         General: No tenderness or deformity. Cervical back: Normal range of motion and neck supple. No rigidity. No muscular tenderness. Right lower leg: No edema. Left lower leg: No edema. Comments: Decreased abduction, adduction, external and severe decreased internal rotation due to pain and stiffness   Skin:     General: Skin is warm and dry. Capillary Refill: Capillary refill takes less than 2 seconds. Findings: No bruising, erythema or rash. Neurological:      General: No focal deficit present. Mental Status: She is alert and oriented to person, place, and time. Coordination: Coordination normal.      Gait: Gait normal.   Psychiatric:         Mood and Affect: Mood normal.         Behavior: Behavior normal.     shoulder Injection - right shoulder, adhesive capsulitis  Patient was placed in the seated position. Point 1 cm posterior and inferior to coracoid process palpated and marked. Site was cleaned using iodine solution and alcohol. Sterile injection of 40 mg methylprednisolone mixed with 2 mL of 1% lidocaine was preformed. Minimal bleeding. Bandage placed. ASSESSMENT & PLAN    1. Adhesive capsulitis of right shoulder  See note above  - [place] - Reese Yusuf MD, Orthopedic Surgery, [place]  - methylPREDNISolone acetate (DEPO-MEDROL) injection 40 mg    2. Acute suppurative otitis media of left ear with spontaneous rupture of tympanic membrane, recurrence not specified  Continue antibiotics and protect from water, further trauma  Needs 6 week follow up to evaluate healing of TM  Return if symptoms worsen    Return if symptoms worsen or fail to improve.          Electronically signed by LUCY Quintero on 11/22/2022      Comment: Please note this report has been produced using speech recognition software and may contain errors related to that system including errors in grammar, punctuation, and spelling, as well as words and phrases that may be inappropriate. If there are any questions or concerns please feel free to contact the dictating provider for clarification.

## 2022-11-29 ENCOUNTER — PATIENT MESSAGE (OUTPATIENT)
Dept: FAMILY MEDICINE CLINIC | Age: 57
End: 2022-11-29

## 2022-11-29 DIAGNOSIS — H66.012 ACUTE SUPPURATIVE OTITIS MEDIA OF LEFT EAR WITH SPONTANEOUS RUPTURE OF TYMPANIC MEMBRANE, RECURRENCE NOT SPECIFIED: Primary | ICD-10-CM

## 2022-11-30 DIAGNOSIS — I10 ESSENTIAL HYPERTENSION: ICD-10-CM

## 2022-12-01 RX ORDER — CIPROFLOXACIN AND DEXAMETHASONE 3; 1 MG/ML; MG/ML
4 SUSPENSION/ DROPS AURICULAR (OTIC) 2 TIMES DAILY
Qty: 7.5 ML | Refills: 0 | Status: SHIPPED | OUTPATIENT
Start: 2022-12-01 | End: 2022-12-11

## 2022-12-01 NOTE — TELEPHONE ENCOUNTER
From: Sadia Burton  Sent: 12/1/2022 2:05 PM EST  To: LUCY Jules  Subject: RE: Ear infection    Please advise    ----- Message -----  From: Sadia Burton  Sent: 12/1/2022 1:48 PM EST  To: [facility] Clinical Staff  Subject: Ear infection     Hi. I finished my antibiotic yesterday. I'm still having purulent drainage. What would you advise me to do?  Thanks, Lauren Hassan

## 2022-12-15 ENCOUNTER — OFFICE VISIT (OUTPATIENT)
Dept: CARDIOLOGY CLINIC | Age: 57
End: 2022-12-15

## 2022-12-15 VITALS
WEIGHT: 293 LBS | DIASTOLIC BLOOD PRESSURE: 84 MMHG | SYSTOLIC BLOOD PRESSURE: 147 MMHG | HEIGHT: 63 IN | BODY MASS INDEX: 51.91 KG/M2 | HEART RATE: 52 BPM

## 2022-12-15 DIAGNOSIS — R00.2 INTERMITTENT PALPITATIONS: ICD-10-CM

## 2022-12-15 DIAGNOSIS — I47.1 PSVT (PAROXYSMAL SUPRAVENTRICULAR TACHYCARDIA) (HCC): ICD-10-CM

## 2022-12-15 DIAGNOSIS — R94.31 ABNORMAL EKG: ICD-10-CM

## 2022-12-15 DIAGNOSIS — R01.1 SYSTOLIC EJECTION MURMUR: ICD-10-CM

## 2022-12-15 DIAGNOSIS — E66.01 OBESITY, MORBID (HCC): ICD-10-CM

## 2022-12-15 DIAGNOSIS — I10 ESSENTIAL HYPERTENSION: Primary | ICD-10-CM

## 2022-12-15 PROCEDURE — 93000 ELECTROCARDIOGRAM COMPLETE: CPT | Performed by: INTERNAL MEDICINE

## 2022-12-15 PROCEDURE — 3078F DIAST BP <80 MM HG: CPT | Performed by: INTERNAL MEDICINE

## 2022-12-15 PROCEDURE — 99214 OFFICE O/P EST MOD 30 MIN: CPT | Performed by: INTERNAL MEDICINE

## 2022-12-15 PROCEDURE — 3074F SYST BP LT 130 MM HG: CPT | Performed by: INTERNAL MEDICINE

## 2022-12-15 RX ORDER — TRAMADOL HYDROCHLORIDE 50 MG/1
50 TABLET ORAL EVERY 6 HOURS PRN
COMMUNITY

## 2022-12-15 NOTE — PROGRESS NOTES
Chief Complaint   Patient presents with    1 Year Follow Up    Hypertension    Palpitations   Pt here to discuss results of holter monitor  Seen in ER sept 15, for high HR/palpitation 180 bpm      1 year follow up. EKG done today.     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)    Chronic low back pain without sciatica    Other hyperlipidemia    Episode of recurrent major depressive disorder (HCC)    Bilateral carpal tunnel syndrome    Right Achilles tendinitis    Systolic ejection murmur 2/6 in the aortic area    Abnormal EKG       Past Surgical History:   Procedure Laterality Date    CARPAL TUNNEL RELEASE Left 11/8/2021    LEFT CARPAL TUNNEL RELEASE performed by Alina Drake DO at [address] [place] [address]  10/16/1987    CHOLECYSTECTOMY, LAPAROSCOPIC  05/1996    ENDOSCOPY, COLON, DIAGNOSTIC  05/1996    SINUS ENDOSCOPY Bilateral 12/13/2018    SINUS ENDOSCOPY FUNCTIONAL SURGERY BILATERAL MAXILLARY ANSTROSTOMY WITH TISSUE REMOVAL performed by Faye Bernard MD at [facility] Bilateral 12/13/2018    SUBMUCOUS RESECTION TURBINOPLASTY  BILATERAL performed by Faye Bernard MD at [place] [address]      4 [place] Teeth Extracted In Past       Allergies   Allergen Reactions    Ampicillin Anaphylaxis    Bactrim [Sulfamethoxazole-Trimethoprim] Anaphylaxis    Flagyl [Metronidazole]      \"Welts\"    Tape Geroldine Ivanoff Tape]      \"Tears The Skin\"        Family History   Problem Relation Age of Onset    Allergy (Severe) Mother     Other Mother         Fibromyalgia    High Blood Pressure Mother     Diabetes Father         \"Pre Diabetic\"    Alcohol Abuse Father         \"Recovered Alcoholic\"    Substance Abuse Father         \"Recovered Alcoholic\"    Cancer Maternal Grandfather         prostate    Cancer Paternal Grandfather         liver    Hypertension Other         grandmother    Diabetes Other         grandmother        Social History     Socioeconomic History    Marital status:      Spouse name: Not on file    Number of children: Not on file    Years of education: Not on file    Highest education level: Not on file   Occupational History    Not on file   Tobacco Use    Smoking status: Never    Smokeless tobacco: Never   Vaping Use    Vaping Use: Never used   Substance and Sexual Activity    Alcohol use: Yes     Comment: 1 - 2 mixed drinks a week    Drug use: No    Sexual activity: Yes     Partners: Male   Other Topics Concern    Not on file   Social History Narrative    Not on file     Social Determinants of Health     Financial Resource Strain: Not on file   Food Insecurity: Not on file   Transportation Needs: Not on file   Physical Activity: Not on file   Stress: Not on file   Social Connections: Not on file   Intimate Partner Violence: Not on file   Housing Stability: Not on file       Current Outpatient Medications   Medication Sig Dispense Refill    traMADol (ULTRAM) 50 MG tablet Take 50 mg by mouth every 6 hours as needed for Pain.      metoprolol tartrate (LOPRESSOR) 25 MG tablet Take 1 tablet by mouth twice daily 180 tablet 0    omeprazole (PRILOSEC) 20 MG delayed release capsule Take 1 capsule by mouth every morning (before breakfast) 90 capsule 1    lisinopril (PRINIVIL;ZESTRIL) 20 MG tablet Take 1 tablet by mouth nightly 90 tablet 1    naproxen (NAPROSYN) 500 MG tablet Take 1 tablet by mouth 2 times daily 180 tablet 1    Ascorbic Acid (VITAMIN C) 1000 MG tablet Cholecalciferol (VITAMIN D3) 1.25 MG (53052 UT) TABS       vitamin B-6 (PYRIDOXINE) 100 MG tablet       GuaiFENesin (MUCINEX PO) Take 1,200 mg by mouth 2 times daily Over The Counter, \"I Take A Regular One In The Morning, I Take DM At Night\"      gabapentin (NEURONTIN) 300 MG capsule Take 1 capsule by mouth nightly for 30 days. Intended supply: 30 days 30 capsule 5     No current facility-administered medications for this visit. Review of Systems -     General ROS: negative  Psychological ROS: negative  Hematological and Lymphatic ROS: No history of blood clots or bleeding disorder. Respiratory ROS: no cough, shortness of breath, or wheezing  Cardiovascular ROS: no chest pain or dyspnea on exertion  Gastrointestinal ROS: negative  Genito-Urinary ROS: no dysuria, trouble voiding, or hematuria  Musculoskeletal ROS: negative  Neurological ROS: no TIA or stroke symptoms  Dermatological ROS: negative      Blood pressure (!) 147/84, pulse 52, height 5' 3\" (1.6 m), weight 295 lb 12.8 oz (134.2 kg), not currently breastfeeding. Physical Examination:    General appearance - alert, well appearing, and in no distress  Mental status - alert, oriented to person, place, and time  Neck - supple, no significant adenopathy, no JVD, or carotid bruits  Chest - clear to auscultation, no wheezes, rales or rhonchi, symmetric air entry  Heart - normal rate, regular rhythm, normal S1, S2, no murmurs, rubs, clicks or gallops  Abdomen - soft, nontender, nondistended, no masses or organomegaly  Neurological - alert, oriented, normal speech, no focal findings or movement disorder noted  Musculoskeletal - no joint tenderness, deformity or swelling  Extremities - peripheral pulses normal, no pedal edema, no clubbing or cyanosis  Skin - normal coloration and turgor, no rashes, no suspicious skin lesions noted    Lab  No results for input(s): CKTOTAL, CKMB, CKMBINDEX, TROPONINI in the last 72 hours.   CBC:   Lab Results   Component Value Date/Time    WBC 9.3 03/11/2020 02:19 PM    RBC 4.46 03/11/2020 02:19 PM    HGB 13.3 03/11/2020 02:19 PM    HCT 40.2 03/11/2020 02:19 PM    MCV 90.1 03/11/2020 02:19 PM    MCH 29.9 03/11/2020 02:19 PM    MCHC 33.2 03/11/2020 02:19 PM    RDW 14.0 03/11/2020 02:19 PM     03/11/2020 02:19 PM    MPV 8.0 03/11/2020 02:19 PM     BMP:    Lab Results   Component Value Date/Time     09/13/2021 10:25 AM    K 4.4 09/13/2021 10:25 AM     09/13/2021 10:25 AM    CO2 24 09/13/2021 10:25 AM    BUN 16 09/13/2021 10:25 AM    LABALBU 4.1 09/13/2021 10:25 AM    CREATININE 0.7 09/13/2021 10:25 AM    CALCIUM 9.7 09/13/2021 10:25 AM    GFRAA >60 09/13/2021 10:25 AM    LABGLOM >60 09/13/2021 10:25 AM    LABGLOM 87 04/27/2019 07:37 PM    GLUCOSE 95 09/13/2021 10:25 AM     Hepatic Function Panel:    Lab Results   Component Value Date/Time    ALKPHOS 137 09/13/2021 10:25 AM    ALT 13 09/13/2021 10:25 AM    AST 20 09/13/2021 10:25 AM    PROT 6.9 09/13/2021 10:25 AM    PROT 6.9 09/13/2021 10:25 AM    BILITOT 0.6 09/13/2021 10:25 AM    LABALBU 4.1 09/13/2021 10:25 AM     Magnesium:    Lab Results   Component Value Date/Time    MG 2.0 04/27/2019 07:37 PM     Warfarin PT/INR:  No components found for: PTPATWAR, PTINRWAR  HgBA1c:  No results found for: LABA1C  FLP:    Lab Results   Component Value Date/Time    TRIG 150 09/13/2021 10:25 AM    HDL 59 09/13/2021 10:25 AM    LDLCALC 163 03/11/2020 02:19 PM    LDLDIRECT 168 09/13/2021 10:25 AM    LABVLDL 29 03/11/2020 02:19 PM     TSH:    Lab Results   Component Value Date/Time    TSH 2.920 09/15/2018 06:15 PM     Sinus tachycardia 109/min  Low voltage QRS, consider pulmonary disease, pericardial effusion, or normal variant  Borderline ECG  No previous ECGs available  Confirmed by Gaby Arenas MD, Callie Fernandez ([id number]) on 9/16/2018 6:57:51 PM         CONCLUSION:  This is a normal sinus rhythm. Average heart rate 82 beats  per minute, ranging from 49 to 150 beats per minute.   No significant pause  of more than 1.5 seconds noted. Rare ventricular ectopic beats are  isolated and couplets total of 24. Rare supraventricular ectopic beats,  total of 134 isolated and one supraventricular ectopic run composed of 3  beats, rate 168 beats per minute. No other form of arrhythmia. Overall,  this is a benign Holter monitor finding. Diary was presented. This Holter  does not explain the palpitation of the patient. Shanell Lynch. Lauren Nicole M.D.     D: 09/24/2018 18:39:40       EKG5/2/19  NSR, no acute abn      The patient's symptoms of  palpitation correlated with the isolated ventricular ectopy. Otherwise,  there was no sustained arrhythmia, pretty much patient almost recorded  every single isolated PVC. CONCLUSION:  1. Sinus rhythm. 2.  The patient's symptoms correlated very nicely with isolated PVCs and  sometimes PACs. Otherwise, there was no sustained arrhythmias. Katelyn Yung M.D.     D: 06/03/2019 7:08:10         ekg 12/7/21  Sinus  Bradycardia   Low voltage in precordial leads.    - Extensive anterior-lateral infarct  Old.    -  Nonspecific T-abnormality. ABNORMAL     Ekg 12/15/22  Sinus  Bradycardia   Low voltage in precordial leads.    -Old anterior infarct. 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 Essential hypertension  EKG 12 lead      2. 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 Intermittent palpitations        4. Systolic ejection murmur 2/6 in the aortic area        5. Abnormal EKG        6. Obesity, morbid ([address]              Plan     The  most current meds and labs  Reviewed    Continue the current treatment and with constant vigilance to changes in symptoms and also any potential side effects.   Return for care or seek medical attention immediately if symptoms got worse and/or develop new symptoms. Palpitations- occasional- stable  Holter is okay  nuc stress and  Echo- WNL   avoided pseudofed tab and get Rx for allergic rehnitis/sinusitis    Palpitation mostly short lasting  Better with lopressor  Event monitor- oKAY - RARE pacs AND PvcS  hX SUGGEST PSVT  LOPRESSOR 25 po bid  FOR NOW PAT WANT CONSERVATIVE MED rx   RECONSIDER eps IF get  RECURRENT    D/w the pat plan of care    Hypertension, on medical treatment. Seems to be under good control. Patient is compliant with medical treatment. Home /80     The pat is Stable and doing well    Discussed use, benefit, and side effects of prescribed medications. All patient questions answered. Pt voiced understanding. Instructed to continue current medications, diet and exercise. Continue risk factor modification and medical management. Patient agreed with treatment plan. Follow up as directed.     RTC in   1 year    [facility]

## 2022-12-19 ENCOUNTER — PATIENT MESSAGE (OUTPATIENT)
Dept: FAMILY MEDICINE CLINIC | Age: 57
End: 2022-12-19

## 2022-12-19 DIAGNOSIS — M25.552 PAIN OF BOTH HIP JOINTS: ICD-10-CM

## 2022-12-19 DIAGNOSIS — G89.29 CHRONIC LOW BACK PAIN WITHOUT SCIATICA, UNSPECIFIED BACK PAIN LATERALITY: ICD-10-CM

## 2022-12-19 DIAGNOSIS — M54.50 CHRONIC LOW BACK PAIN WITHOUT SCIATICA, UNSPECIFIED BACK PAIN LATERALITY: ICD-10-CM

## 2022-12-19 DIAGNOSIS — M25.551 PAIN OF BOTH HIP JOINTS: ICD-10-CM

## 2022-12-19 RX ORDER — TRAMADOL HYDROCHLORIDE 50 MG/1
50 TABLET ORAL 2 TIMES DAILY PRN
Qty: 60 TABLET | Refills: 0 | Status: SHIPPED | OUTPATIENT
Start: 2022-12-19 | End: 2023-01-18

## 2022-12-19 NOTE — TELEPHONE ENCOUNTER
From: Bobby Drake  To: Dr. Melanie Hopper: 12/19/2022 12:33 PM EST  Subject: Refill    May I have a refill on my tramadol, please and thank you?

## 2022-12-22 ENCOUNTER — OFFICE VISIT (OUTPATIENT)
Dept: ORTHOPEDIC SURGERY | Age: 57
End: 2022-12-22

## 2022-12-22 VITALS
TEMPERATURE: 98.6 F | HEART RATE: 88 BPM | DIASTOLIC BLOOD PRESSURE: 86 MMHG | WEIGHT: 293 LBS | SYSTOLIC BLOOD PRESSURE: 138 MMHG | HEIGHT: 63 IN | BODY MASS INDEX: 51.91 KG/M2

## 2022-12-22 DIAGNOSIS — M75.01 ADHESIVE CAPSULITIS OF RIGHT SHOULDER: Primary | ICD-10-CM

## 2022-12-22 RX ORDER — ACETAMINOPHEN 500 MG
500 TABLET ORAL EVERY 6 HOURS PRN
COMMUNITY

## 2022-12-22 ASSESSMENT — ENCOUNTER SYMPTOMS
VOMITING: 0
COLOR CHANGE: 0
PHOTOPHOBIA: 0
STRIDOR: 0
ABDOMINAL PAIN: 0
NAUSEA: 0
EYE REDNESS: 0
SHORTNESS OF BREATH: 0
EYE PAIN: 0
FACIAL SWELLING: 0
COUGH: 0
BACK PAIN: 0
WHEEZING: 0

## 2022-12-22 NOTE — PATIENT INSTRUCTIONS
Continue to weight bear as tolerated  Continue range of motion  Ice and elevate as needed  Tylenol or Motrin for pain  Injection into the right shoulder  Avoid any high impact activities for 24-48 hours  Follow up in 3 months      We are committed to providing you the best care possible. If you receive a survey after visiting one of our offices, please take time to share your experience concerning your physician office visit. These surveys are confidential and no health information about you is shared. We are eager to improve for you and we are counting on your feedback to help make that happen.

## 2022-12-22 NOTE — PROGRESS NOTES
12/22/2022   Chief Complaint   Patient presents with    Shoulder Pain     Adhesive capsulitis of right shoulder        History of Present Illness:                             Drew Maciel is a 61 y.o. female right hand dominant female who previously underwent carpal tunnel release by myself in November of last year and states that the carpal tunnel has resolved and she is doing great but she has now developed adhesive capsulitis of the right shoulder. She has been seen by primary care physician and underwent a cortisone injection to the glenohumeral joint with minimal relief of her pain. She is currently in physical therapy and just begun with this to try to relieve her stiffness. She is had no other treatment thus far. She is not a diabetic and has no endocrine disease. She is not had any prior episodes of adhesive capsulitis. She denies any injury to the shoulder. She has no advanced imaging the shoulder. This my first time seeing her for this issue. Patient works as a traveling nurse. The initial injury occurred when she has had no prior injury left shoulder. She is nondiabetic and does not any count or endocrine disease. She not had any previous episodes of adhesive capsulitis. The pain occurs every day and when active. Location of pain is diffusely at the shoulder mainly at the anterior. No history of dislocation. Symptoms are aggravated by ADL's, repetitive use, work at or above shoulder height, difficulty sleeping on affected side. Symptoms are diminished by rest, avoiding the painful activities. Limited activities include: lifting, repetitive use, work at or above shoulder height, difficulty sleeping, difficulty with ADLs. Significant stiffness is reported. Patient denies numbness, tingling, altered sensation in the extremity. Related history: negative for prior surgery, trauma, arthritis or disorders. Is affecting ADLs.   Pain is 6/10 at it's worst.    Outside reports reviewed: none. Patient's medications, allergies, past medical, surgical, social and family histories were reviewed and updated as appropriate. Patient has not previously attempted CSI, PT, NSAIDs for pain relief       Medical History  Patient's medications, allergies, past medical, surgical, social and family histories were reviewed and updated as appropriate.     Past Medical History:   Diagnosis Date    Acid reflux     Anxiety     Arthritis     \"Hands And Hips\"    Bronchitis Last Episode 2-18    Depression     Environmental allergies     Granulomatous hepatitis Dx 5-96    Heart palpitations     Hyperlipidemia     Hypertension     Pneumonia Dx 4-17    Shortness of breath on exertion     Teeth missing     Upper And Lower    Wears glasses     [place] teeth extracted     4 [place] Teeth Extracted In Past     Past Surgical History:   Procedure Laterality Date    CARPAL TUNNEL RELEASE Left 11/8/2021    LEFT CARPAL TUNNEL RELEASE performed by Rosemary Abdi DO at [address] [place] [address] Drive  10/16/1987    CHOLECYSTECTOMY, LAPAROSCOPIC  05/1996    ENDOSCOPY, COLON, DIAGNOSTIC  05/1996    SINUS ENDOSCOPY Bilateral 12/13/2018    SINUS ENDOSCOPY FUNCTIONAL SURGERY BILATERAL MAXILLARY ANSTROSTOMY WITH TISSUE REMOVAL performed by David Elias MD at [facility] Bilateral 12/13/2018    SUBMUCOUS RESECTION TURBINOPLASTY  BILATERAL performed by David Elias MD at [address] [place] [address]      4 [place] Teeth Extracted In Past     Family History   Problem Relation Age of Onset    Allergy (Severe) Mother     Other Mother         Fibromyalgia    High Blood Pressure Mother     Diabetes Father         \"Pre Diabetic\"    Alcohol Abuse Father         \"Recovered Alcoholic\"    Substance Abuse Father         \"Recovered Alcoholic\"    Cancer Maternal Grandfather         prostate    Cancer Paternal Grandfather         liver    Hypertension Other         grandmother    Diabetes Other grandmother     Social History     Socioeconomic History    Marital status:      Spouse name: None    Number of children: None    Years of education: None    Highest education level: None   Tobacco Use    Smoking status: Never    Smokeless tobacco: Never   Vaping Use    Vaping Use: Never used   Substance and Sexual Activity    Alcohol use: Yes     Comment: 1 - 2 mixed drinks a week    Drug use: No    Sexual activity: Yes     Partners: Male     Current Outpatient Medications   Medication Sig Dispense Refill    acetaminophen (TYLENOL) 500 MG tablet Take 500 mg by mouth every 6 hours as needed for Pain      traMADol (ULTRAM) 50 MG tablet Take 1 tablet by mouth 2 times daily as needed for Pain for up to 30 days. 60 tablet 0    traMADol (ULTRAM) 50 MG tablet Take 50 mg by mouth every 6 hours as needed for Pain.      metoprolol tartrate (LOPRESSOR) 25 MG tablet Take 1 tablet by mouth twice daily 180 tablet 0    omeprazole (PRILOSEC) 20 MG delayed release capsule Take 1 capsule by mouth every morning (before breakfast) 90 capsule 1    lisinopril (PRINIVIL;ZESTRIL) 20 MG tablet Take 1 tablet by mouth nightly 90 tablet 1    naproxen (NAPROSYN) 500 MG tablet Take 1 tablet by mouth 2 times daily 180 tablet 1    Ascorbic Acid (VITAMIN C) 1000 MG tablet       Cholecalciferol (VITAMIN D3) 1.25 MG (74559 UT) TABS       vitamin B-6 (PYRIDOXINE) 100 MG tablet       GuaiFENesin (MUCINEX PO) Take 1,200 mg by mouth 2 times daily Over The Counter, \"I Take A Regular One In The Morning, I Take DM At Night\"      gabapentin (NEURONTIN) 300 MG capsule Take 1 capsule by mouth nightly for 30 days. Intended supply: 30 days 30 capsule 5     No current facility-administered medications for this visit.      Allergies   Allergen Reactions    Ampicillin Anaphylaxis    Bactrim [Sulfamethoxazole-Trimethoprim] Anaphylaxis    Flagyl [Metronidazole]      \"Welts\"    Tape Liliya Claudia Tape]      \"Tears The Skin\"         Review of Systems Constitutional:  Positive for activity change. Negative for appetite change, chills, diaphoresis, fatigue, fever and unexpected weight change. HENT:  Negative for congestion, ear pain, facial swelling, hearing loss and sneezing. Eyes:  Negative for photophobia, pain and redness. Respiratory:  Negative for cough, shortness of breath, wheezing and stridor. Cardiovascular:  Negative for chest pain, palpitations and leg swelling. Gastrointestinal:  Negative for abdominal pain, nausea and vomiting. Endocrine: Negative for cold intolerance and heat intolerance. Musculoskeletal:  Positive for arthralgias and myalgias. Negative for back pain, gait problem, joint swelling, neck pain and neck stiffness. Skin:  Negative for color change, pallor, rash and wound. Neurological:  Negative for dizziness, facial asymmetry, weakness and numbness. Examination:  General Exam:  Vitals: /86 (Site: Right Upper Arm, Position: Sitting, Cuff Size: Medium Adult)   Pulse 88   Temp 98.6 °F (37 °C)   Ht 5' 3\" (1.6 m)   Wt 295 lb (133.8 kg)   BMI 52.26 kg/m²    Physical Exam  Constitutional:       Appearance: Normal appearance. HENT:      Head: Normocephalic and atraumatic. Nose: Nose normal.   Eyes:      General:         Right eye: No discharge. Left eye: No discharge. Extraocular Movements: Extraocular movements intact. Cardiovascular:      Pulses: Normal pulses. Pulmonary:      Effort: Pulmonary effort is normal.      Breath sounds: Normal breath sounds. Musculoskeletal:         General: Tenderness present. No swelling, deformity or signs of injury. Right shoulder: Tenderness, bony tenderness and crepitus present. No swelling, deformity, effusion or laceration. Decreased range of motion. Normal strength. Normal pulse.       Left shoulder: Normal.      Right upper arm: Normal.      Left upper arm: Normal.      Right elbow: Normal. Left elbow: Normal.      Right forearm: Normal.      Left forearm: Normal.      Right wrist: Normal.      Left wrist: Normal.      Cervical back: Normal and normal range of motion. No rigidity or tenderness. Skin:     General: Skin is warm and dry. Capillary Refill: Capillary refill takes less than 2 seconds. Neurological:      General: No focal deficit present. Mental Status: She is alert and oriented to person, place, and time. RIGHT SHOULDER / UPPER EXTREMITY EXAM      OBSERVATION:      Swelling: none   Deformity: none    Discoloration: none   Scapular winging: none    Scars: none    Atrophy: none      TENDERNESS / CREPITUS (T/C):      Clavicle -/-    SUPRAspinatus  -/-     AC Jt. -/-    INFRAspinatus -/-     SC Jt. -/-    Deltoid -/-     G. Tuberosity -/-   LH BICEP groove -/-    Acromion: -/-    Midline Neck -/-     Scapular Spine -/-   Trapezium -/-    Inf Border Scapula -/-   GH jt. line - post -/-     Scapulothoracic -/-   GH jt. line - ant +/-       ROM: (* = with pain)  Left shoulder   Right shoulder     AROM (PROM)  AROM (PROM)    FE   170° (175°)    70° (90°)      ER 0°   60° (65°)   15 (20°)    ER 90° ABD  90°  (90°)   unable to test    IR 90° ABD  40  (40°)    unable to test     IR (spine) T10    unable to test      STRENGTH: (* = with pain) Left shoulder   Right shoulder    SCAPTION   5/5    5/5     IR    5/5    5/5    ER    5/5    5/5    Deltoid   5/5    5/5    Biceps    5/5    5/5    Triceps   5/5    5/5        EXTREMITY NEURO-VASCULAR EXAM:     Sensation grossly intact to light touch all dermatomal regions. DTR 2+ Biceps, Triceps, BR and Negative Sauls sign    Grossly intact motor function at Elbow, Wrist and Hand    Distal pulses radial and ulnar 2+, brisk cap refill, symmetric. NECK:  Painless FROM and spinous processes non-tender. Negative Spurlings sign.       Diagnostic testing:  X-ray images were reviewed by myself and discussed with the patient:  3 views of the right shoulder in a skeletally mature patient demonstrates no acute osseous abnormalities. Patient has appropriate position glenohumeral joint with no sign of any fracture or soft tissue avulsion type injury. Acromiohumeral distance is appropriate. No sign of any fracture or soft tissue calcification. Office Procedures:  No orders of the defined types were placed in this encounter. Right subacromial Shoulder Injection Procedure Note   Pre-operative Diagnosis: Right shoulder adhesive capsulitis  Post-operative Diagnosis: same   Indications: Symptomatic relief of adhesive capsulitis  Anesthesia: Lidocaine 1% and marcaine 0.5% without epinephrine without added sodium bicarbonate   Procedure Details   Verbal consent was obtained for the procedure. The shoulder was prepped with alcohol. A 22 gauge needle was advanced into the subacromial space through posterior approach without difficulty The space was then injected with 1 ml 1% lidocaine and 1 ml 0.5% marcaine and 1 ml of triamcinolone (KENALOG) 40mg/ml. The injection site was cleansed with isopropyl alcohol and a dressing was applied. Complications: None; patient tolerated the procedure well. Symptoms were improved immediately after the injection. Assessment and Plan    A: Right shoulder adhesive capsulitis    P:   I had a thorough conversation with the patient regarding her current imaging as well as her physical exam findings and explained that I do agree that she has adhesive capsulitis. At this time because she underwent a glenohumeral joint injection we could attempt a subacromial injection to see if this provides relief. This was agreed upon by the patient and she underwent the right shoulder subacromial injection without complication did have mild immediate pain relief. I did take her through gentle range of motion after injection was performed to try to help break up any small adhesions that I could.   She will continue with physical therapy and work on range of motion on her own as well. She will follow-up in 3 months at that time we can discuss further injections as well as further physical therapy versus a manipulation under anesthesia with or without lysis of adhesions arthroscopically. Patient has no other restrictions at this time. All questions were answered and patient voices her understanding.     Electronically signed by Bravo Simon DO on 12/22/2022 at 10:07 AM

## 2022-12-22 NOTE — PROGRESS NOTES
Pt in office today for adhesive capsulitis of right shoulder, pain rated at its worst 10/10. Unsure of aggravating factors, says \"it just depends\". Pt is taking naproxen and tylenol, not as effective as she would like.  Xrays taken in office today (12/22/2022)

## 2023-01-01 DIAGNOSIS — G56.03 BILATERAL CARPAL TUNNEL SYNDROME: ICD-10-CM

## 2023-01-01 DIAGNOSIS — K21.9 GASTROESOPHAGEAL REFLUX DISEASE, UNSPECIFIED WHETHER ESOPHAGITIS PRESENT: ICD-10-CM

## 2023-01-03 ENCOUNTER — OFFICE VISIT (OUTPATIENT)
Dept: FAMILY MEDICINE CLINIC | Age: 58
End: 2023-01-03

## 2023-01-03 VITALS
BODY MASS INDEX: 51.91 KG/M2 | DIASTOLIC BLOOD PRESSURE: 76 MMHG | SYSTOLIC BLOOD PRESSURE: 110 MMHG | OXYGEN SATURATION: 96 % | HEART RATE: 59 BPM | WEIGHT: 293 LBS | HEIGHT: 63 IN

## 2023-01-03 DIAGNOSIS — G56.03 BILATERAL CARPAL TUNNEL SYNDROME: ICD-10-CM

## 2023-01-03 DIAGNOSIS — J32.0 MAXILLARY SINUSITIS, CHRONIC: ICD-10-CM

## 2023-01-03 DIAGNOSIS — H66.012 ACUTE SUPPURATIVE OTITIS MEDIA OF LEFT EAR WITH SPONTANEOUS RUPTURE OF TYMPANIC MEMBRANE, RECURRENCE NOT SPECIFIED: Primary | ICD-10-CM

## 2023-01-03 PROCEDURE — 3074F SYST BP LT 130 MM HG: CPT | Performed by: PHYSICIAN ASSISTANT

## 2023-01-03 PROCEDURE — 3078F DIAST BP <80 MM HG: CPT | Performed by: PHYSICIAN ASSISTANT

## 2023-01-03 PROCEDURE — 99213 OFFICE O/P EST LOW 20 MIN: CPT | Performed by: PHYSICIAN ASSISTANT

## 2023-01-03 RX ORDER — OMEPRAZOLE 20 MG/1
20 CAPSULE, DELAYED RELEASE ORAL
Qty: 90 CAPSULE | Refills: 1 | OUTPATIENT
Start: 2023-01-03

## 2023-01-03 RX ORDER — GABAPENTIN 300 MG/1
300 CAPSULE ORAL NIGHTLY
Qty: 30 CAPSULE | Refills: 5 | OUTPATIENT
Start: 2023-01-03 | End: 2023-02-02

## 2023-01-03 RX ORDER — GABAPENTIN 300 MG/1
300 CAPSULE ORAL NIGHTLY
Qty: 30 CAPSULE | Refills: 5 | Status: SHIPPED | OUTPATIENT
Start: 2023-01-03 | End: 2023-02-02

## 2023-02-24 ENCOUNTER — PATIENT MESSAGE (OUTPATIENT)
Dept: FAMILY MEDICINE CLINIC | Age: 58
End: 2023-02-24

## 2023-02-24 DIAGNOSIS — G56.03 BILATERAL CARPAL TUNNEL SYNDROME: Primary | ICD-10-CM

## 2023-02-24 RX ORDER — TRAMADOL HYDROCHLORIDE 50 MG/1
50 TABLET ORAL 2 TIMES DAILY PRN
Qty: 60 TABLET | Refills: 0 | Status: SHIPPED | OUTPATIENT
Start: 2023-02-24 | End: 2023-03-26

## 2023-02-24 NOTE — TELEPHONE ENCOUNTER
From: Amado Reese  To: Dr. Sangeetha Conteh: 2/24/2023 2:14 PM EST  Subject: Refill    May I have a refill on my tramadol please? Thank you.  Maria C Gant

## 2023-03-13 DIAGNOSIS — I10 ESSENTIAL HYPERTENSION: ICD-10-CM

## 2023-03-22 ENCOUNTER — OFFICE VISIT (OUTPATIENT)
Dept: ORTHOPEDIC SURGERY | Age: 58
End: 2023-03-22

## 2023-03-22 VITALS — OXYGEN SATURATION: 98 % | DIASTOLIC BLOOD PRESSURE: 60 MMHG | HEART RATE: 78 BPM | SYSTOLIC BLOOD PRESSURE: 99 MMHG

## 2023-03-22 DIAGNOSIS — M75.01 ADHESIVE CAPSULITIS OF RIGHT SHOULDER: Primary | ICD-10-CM

## 2023-03-22 ASSESSMENT — ENCOUNTER SYMPTOMS
STRIDOR: 0
NAUSEA: 0
COLOR CHANGE: 0
EYE REDNESS: 0
SHORTNESS OF BREATH: 0
EYE PAIN: 0
COUGH: 0
VOMITING: 0
PHOTOPHOBIA: 0
ABDOMINAL PAIN: 0
FACIAL SWELLING: 0
WHEEZING: 0
BACK PAIN: 0

## 2023-03-22 NOTE — PROGRESS NOTES
Patient, 61year old female, is here for 3 month follow up on right shoulder injection. She states this injection was minimally effective with no improvement to range of motion. States PT has been minimally effective for pain with no improvement in range of motion. No new injuries. Numbness and tingling present in right hand, self reported as carpal tunnel. Wants to know what the next step is.
repetitive use, work at or above shoulder height, difficulty sleeping, difficulty with ADLs. Significant stiffness is reported. Patient denies numbness, tingling, altered sensation in the extremity. Related history: negative for prior surgery, trauma, arthritis or disorders. Is affecting ADLs. Pain is 6/10 at it's worst.    Outside reports reviewed: none. Patient's medications, allergies, past medical, surgical, social and family histories were reviewed and updated as appropriate. Patient has not previously attempted CSI, PT, NSAIDs for pain relief       Medical History  Patient's medications, allergies, past medical, surgical, social and family histories were reviewed and updated as appropriate.     Past Medical History:   Diagnosis Date    Acid reflux     Anxiety     Arthritis     \"Hands And Hips\"    Bronchitis Last Episode 2-18    Depression     Environmental allergies     Granulomatous hepatitis Dx 5-96    Heart palpitations     Hyperlipidemia     Hypertension     Pneumonia Dx 4-17    Shortness of breath on exertion     Teeth missing     Upper And Lower    Wears glasses     [place] teeth extracted     4 [place] Teeth Extracted In Past     Past Surgical History:   Procedure Laterality Date    CARPAL TUNNEL RELEASE Left 11/8/2021    LEFT CARPAL TUNNEL RELEASE performed by Amy Monge DO at [address] [place] [address]  10/16/1987    CHOLECYSTECTOMY, LAPAROSCOPIC  05/1996    ENDOSCOPY, COLON, DIAGNOSTIC  05/1996    SINUS ENDOSCOPY Bilateral 12/13/2018    SINUS ENDOSCOPY FUNCTIONAL SURGERY BILATERAL MAXILLARY ANSTROSTOMY WITH TISSUE REMOVAL performed by Bhakti Torres MD at [facility] Bilateral 12/13/2018    SUBMUCOUS RESECTION TURBINOPLASTY  BILATERAL performed by Bhakti Torres MD at [facility] EXTRACTION      4 [place] Teeth Extracted In Past     Family History   Problem Relation Age of Onset    Allergy (Severe) Mother     Other Mother         Fibromyalgia

## 2023-03-22 NOTE — PATIENT INSTRUCTIONS
Follow up in 2 months. Central scheduling [phone] will be calling you to schedule your MRI. If you do not hear from them with in a week give them a call.

## 2023-05-20 ENCOUNTER — PATIENT MESSAGE (OUTPATIENT)
Dept: FAMILY MEDICINE CLINIC | Age: 58
End: 2023-05-20

## 2023-05-20 DIAGNOSIS — G56.03 BILATERAL CARPAL TUNNEL SYNDROME: ICD-10-CM

## 2023-05-22 RX ORDER — TRAMADOL HYDROCHLORIDE 50 MG/1
50 TABLET ORAL 2 TIMES DAILY PRN
Qty: 60 TABLET | Refills: 0 | Status: SHIPPED | OUTPATIENT
Start: 2023-05-22 | End: 2023-06-21

## 2023-05-22 NOTE — TELEPHONE ENCOUNTER
From: Crystal Tolentino  To: Dr. Marlys Huang: 2023 1:11 PM EDT  Subject: Tramadol refill    Would you send a refill for my tramadol please?     Thank you, Kleber Tavarez

## 2023-06-29 DIAGNOSIS — G56.03 BILATERAL CARPAL TUNNEL SYNDROME: ICD-10-CM

## 2023-06-29 RX ORDER — GABAPENTIN 300 MG/1
300 CAPSULE ORAL NIGHTLY
Qty: 30 CAPSULE | Refills: 5 | Status: SHIPPED | OUTPATIENT
Start: 2023-06-29 | End: 2023-07-29

## 2023-08-10 ENCOUNTER — PATIENT MESSAGE (OUTPATIENT)
Dept: FAMILY MEDICINE CLINIC | Age: 58
End: 2023-08-10

## 2023-08-10 DIAGNOSIS — G56.03 BILATERAL CARPAL TUNNEL SYNDROME: ICD-10-CM

## 2023-08-10 RX ORDER — TRAMADOL HYDROCHLORIDE 50 MG/1
50 TABLET ORAL 2 TIMES DAILY PRN
Qty: 60 TABLET | Refills: 0 | Status: SHIPPED | OUTPATIENT
Start: 2023-08-10 | End: 2023-09-09

## 2023-08-10 NOTE — TELEPHONE ENCOUNTER
From: Lainey Fong  To: Dr. Landis Point: 8/10/2023 12:28 PM EDT  Subject: Tramadol    May I have a refill on my tramadol? Please and thank you.   [facility]

## 2023-08-15 ENCOUNTER — PATIENT MESSAGE (OUTPATIENT)
Dept: FAMILY MEDICINE CLINIC | Age: 58
End: 2023-08-15

## 2023-08-15 DIAGNOSIS — G56.03 BILATERAL CARPAL TUNNEL SYNDROME: ICD-10-CM

## 2023-08-18 RX ORDER — TRAMADOL HYDROCHLORIDE 50 MG/1
50 TABLET ORAL 2 TIMES DAILY PRN
Qty: 60 TABLET | Refills: 0 | Status: SHIPPED | OUTPATIENT
Start: 2023-08-18 | End: 2023-09-17

## 2023-09-19 ENCOUNTER — TELEMEDICINE (OUTPATIENT)
Dept: FAMILY MEDICINE CLINIC | Age: 58
End: 2023-09-19

## 2023-09-19 DIAGNOSIS — M25.551 PAIN OF BOTH HIP JOINTS: ICD-10-CM

## 2023-09-19 DIAGNOSIS — G56.03 BILATERAL CARPAL TUNNEL SYNDROME: ICD-10-CM

## 2023-09-19 DIAGNOSIS — G89.29 CHRONIC LOW BACK PAIN WITHOUT SCIATICA, UNSPECIFIED BACK PAIN LATERALITY: ICD-10-CM

## 2023-09-19 DIAGNOSIS — M15.9 PRIMARY OSTEOARTHRITIS INVOLVING MULTIPLE JOINTS: Primary | ICD-10-CM

## 2023-09-19 DIAGNOSIS — M54.50 CHRONIC LOW BACK PAIN WITHOUT SCIATICA, UNSPECIFIED BACK PAIN LATERALITY: ICD-10-CM

## 2023-09-19 DIAGNOSIS — M25.552 PAIN OF BOTH HIP JOINTS: ICD-10-CM

## 2023-09-19 DIAGNOSIS — I10 ESSENTIAL HYPERTENSION: ICD-10-CM

## 2023-09-19 PROCEDURE — 99213 OFFICE O/P EST LOW 20 MIN: CPT | Performed by: STUDENT IN AN ORGANIZED HEALTH CARE EDUCATION/TRAINING PROGRAM

## 2023-09-19 RX ORDER — NAPROXEN 500 MG/1
500 TABLET ORAL 2 TIMES DAILY
Qty: 180 TABLET | Refills: 1 | Status: SHIPPED | OUTPATIENT
Start: 2023-09-19

## 2023-09-19 RX ORDER — TRAMADOL HYDROCHLORIDE 50 MG/1
50 TABLET ORAL 2 TIMES DAILY PRN
Qty: 60 TABLET | Refills: 0 | Status: SHIPPED | OUTPATIENT
Start: 2023-09-19 | End: 2023-10-19

## 2023-09-19 ASSESSMENT — PATIENT HEALTH QUESTIONNAIRE - PHQ9
SUM OF ALL RESPONSES TO PHQ QUESTIONS 1-9: 0
3. TROUBLE FALLING OR STAYING ASLEEP: 0
SUM OF ALL RESPONSES TO PHQ9 QUESTIONS 1 & 2: 0
SUM OF ALL RESPONSES TO PHQ QUESTIONS 1-9: 0
4. FEELING TIRED OR HAVING LITTLE ENERGY: 0
10. IF YOU CHECKED OFF ANY PROBLEMS, HOW DIFFICULT HAVE THESE PROBLEMS MADE IT FOR YOU TO DO YOUR WORK, TAKE CARE OF THINGS AT HOME, OR GET ALONG WITH OTHER PEOPLE: 0
2. FEELING DOWN, DEPRESSED OR HOPELESS: 0
7. TROUBLE CONCENTRATING ON THINGS, SUCH AS READING THE NEWSPAPER OR WATCHING TELEVISION: 0
8. MOVING OR SPEAKING SO SLOWLY THAT OTHER PEOPLE COULD HAVE NOTICED. OR THE OPPOSITE, BEING SO FIGETY OR RESTLESS THAT YOU HAVE BEEN MOVING AROUND A LOT MORE THAN USUAL: 0
SUM OF ALL RESPONSES TO PHQ QUESTIONS 1-9: 0
1. LITTLE INTEREST OR PLEASURE IN DOING THINGS: 0
SUM OF ALL RESPONSES TO PHQ QUESTIONS 1-9: 0
5. POOR APPETITE OR OVEREATING: 0
9. THOUGHTS THAT YOU WOULD BE BETTER OFF DEAD, OR OF HURTING YOURSELF: 0
6. FEELING BAD ABOUT YOURSELF - OR THAT YOU ARE A FAILURE OR HAVE LET YOURSELF OR YOUR FAMILY DOWN: 0

## 2023-09-19 NOTE — PROGRESS NOTES
Heavenly Wagner, was evaluated through a synchronous (real-time) audio-video encounter. The patient (or guardian if applicable) is aware that this is a billable service, which includes applicable co-pays. This Virtual Visit was conducted with patient's (and/or legal guardian's) consent. Patient identification was verified, and a caregiver was present when appropriate. The patient was located at Home: [address] [place] [address] [id number]  Provider was located at Facility ([facility]): [address]. [address] [facility],  [address]  Yes    Heavenly Wagner (:  1965) is a Established patient, presenting virtually for evaluation of the following:    Assessment & Plan   Below is the assessment and plan developed based on review of pertinent history, physical exam, labs, studies, and medications. 1. Primary osteoarthritis involving multiple joints  2. Pain of both hip joints  -     naproxen (NAPROSYN) 500 MG tablet; Take 1 tablet by mouth 2 times daily, Disp-180 tablet, R-1Please place on fileNormal  3. Chronic low back pain without sciatica, unspecified back pain laterality  4. Essential hypertension  5. Bilateral carpal tunnel syndrome  -     traMADol (ULTRAM) 50 MG tablet; Take 1 tablet by mouth 2 times daily as needed for Pain for up to 30 days. Max Daily Amount: 100 mg, Disp-60 tablet, R-0Normal    Pain fair  Patient inteseted in cortisone injection in knee she will schedule appt. Return in about 3 months (around 2023) for schedule appt for knee injection whenever patient prefers. Subjective   HPI  59 yo F presents for follow up on chronic conditions. No acute complatins  BP excellent  Worsening arthirtis she is interested cortisone injection knee    Review of Systems   Constitutional:  Negative for chills and fatigue. HENT:  Negative for congestion and sore throat. Respiratory:  Negative for shortness of breath and wheezing.     Cardiovascular:  Negative for chest pain and

## 2023-09-26 ASSESSMENT — ENCOUNTER SYMPTOMS
SHORTNESS OF BREATH: 0
NAUSEA: 0
SORE THROAT: 0
ABDOMINAL PAIN: 0
WHEEZING: 0

## 2023-10-05 ENCOUNTER — OFFICE VISIT (OUTPATIENT)
Dept: FAMILY MEDICINE CLINIC | Age: 58
End: 2023-10-05

## 2023-10-05 VITALS
BODY MASS INDEX: 51.91 KG/M2 | SYSTOLIC BLOOD PRESSURE: 130 MMHG | HEART RATE: 57 BPM | OXYGEN SATURATION: 98 % | DIASTOLIC BLOOD PRESSURE: 72 MMHG | WEIGHT: 293 LBS | HEIGHT: 63 IN

## 2023-10-05 DIAGNOSIS — Z23 ENCOUNTER FOR IMMUNIZATION: Primary | ICD-10-CM

## 2023-10-05 DIAGNOSIS — M15.9 PRIMARY OSTEOARTHRITIS INVOLVING MULTIPLE JOINTS: ICD-10-CM

## 2023-10-05 PROCEDURE — 99999 PR OFFICE/OUTPT VISIT,PROCEDURE ONLY: CPT | Performed by: STUDENT IN AN ORGANIZED HEALTH CARE EDUCATION/TRAINING PROGRAM

## 2023-10-05 PROCEDURE — 90471 IMMUNIZATION ADMIN: CPT | Performed by: STUDENT IN AN ORGANIZED HEALTH CARE EDUCATION/TRAINING PROGRAM

## 2023-10-05 PROCEDURE — 90674 CCIIV4 VAC NO PRSV 0.5 ML IM: CPT | Performed by: STUDENT IN AN ORGANIZED HEALTH CARE EDUCATION/TRAINING PROGRAM

## 2023-10-05 RX ORDER — METHYLPREDNISOLONE ACETATE 40 MG/ML
40 INJECTION, SUSPENSION INTRA-ARTICULAR; INTRALESIONAL; INTRAMUSCULAR; SOFT TISSUE ONCE
Status: SHIPPED | OUTPATIENT
Start: 2023-10-05

## 2023-10-05 NOTE — PROGRESS NOTES
Patient came in for a left knee cortisone injection, Dr. Charli Soliz was unable to give the injection and recommended following up with Dr. Tommy West, she was also given her flu vaccine.

## 2023-10-05 NOTE — PROGRESS NOTES
10/5/2023    Elham Coles    Chief Complaint   Patient presents with    Injections     Left knee cortisone injection        HPI  History was obtained from ***. Waylon pierre is a 60 y.o. female with a PMHx as listed below who presents today for       No diagnosis found.          REVIEW OF SYMPTOMS    Review of Systems    PAST MEDICAL HISTORY  Past Medical History:   Diagnosis Date    Acid reflux     Anxiety     Arthritis     \"Hands And Hips\"    Bronchitis Last Episode 2-18    Depression     Environmental allergies     Granulomatous hepatitis Dx 5-96    Heart palpitations     Hyperlipidemia     Hypertension     Pneumonia Dx 4-17    Shortness of breath on exertion     Teeth missing     Upper And Lower    Wears glasses     [place] teeth extracted     4 [place] Teeth Extracted In Past       FAMILY HISTORY  Family History   Problem Relation Age of Onset    Allergy (Severe) Mother     Other Mother         Fibromyalgia    High Blood Pressure Mother     Diabetes Father         \"Pre Diabetic\"    Alcohol Abuse Father         \"Recovered Alcoholic\"    Substance Abuse Father         \"Recovered Alcoholic\"    Cancer Maternal Grandfather         prostate    Cancer Paternal Grandfather         liver    Hypertension Other         grandmother    Diabetes Other         grandmother       SOCIAL HISTORY  Social History     Socioeconomic History    Marital status:    Tobacco Use    Smoking status: Never    Smokeless tobacco: Never   Vaping Use    Vaping Use: Never used   Substance and Sexual Activity    Alcohol use: Yes     Comment: 1 - 2 mixed drinks a week    Drug use: No    Sexual activity: Yes     Partners: Male        SURGICAL HISTORY  Past Surgical History:   Procedure Laterality Date    CARPAL TUNNEL RELEASE Left 11/8/2021    LEFT CARPAL TUNNEL RELEASE performed by Gustavo Cisneros DO at [address]  10/16/1987    CHOLECYSTECTOMY, LAPAROSCOPIC  05/1996    ENDOSCOPY, COLON, DIAGNOSTIC  05/1996    SINUS ENDOSCOPY

## 2023-10-06 NOTE — PROGRESS NOTES
1/3/2023    Bhakti Mention    Chief Complaint   Patient presents with    Follow-up       HPI  History was obtained from pt. Margarita Lira is a 61 y.o. female with a PMHx as listed below who presents today for 6 week follow up for ear infection and TM rupture. She had a lot of drainage, that cleared up with the ear drops we prescribed. The drainage and pain resolved but the hearing is still bad and she feels pressure in there. Does have a history of chronic sinusitis and has had procedures on the left side of her sinuses by the ENT doctors. Pt is going in for a right shoulder manipulation under anesthesia. She had a steroid injection by yovana which helped her a lot. 1. Acute suppurative otitis media of left ear with spontaneous rupture of tympanic membrane, recurrence not specified    2. Bilateral carpal tunnel syndrome    3.  Maxillary sinusitis, chronic         REVIEW OF SYMPTOMS    Review of Systems    PAST MEDICAL HISTORY  Past Medical History:   Diagnosis Date    Acid reflux     Anxiety     Arthritis     \"Hands And Hips\"    Bronchitis Last Episode 2-18    Depression     Environmental allergies     Granulomatous hepatitis Dx 5-96    Heart palpitations     Hyperlipidemia     Hypertension     Pneumonia Dx 4-17    Shortness of breath on exertion     Teeth missing     Upper And Lower    Wears glasses     [place] teeth extracted     4 [place] Teeth Extracted In Past       FAMILY HISTORY  Family History   Problem Relation Age of Onset    Allergy (Severe) Mother     Other Mother         Fibromyalgia    High Blood Pressure Mother     Diabetes Father         \"Pre Diabetic\"    Alcohol Abuse Father         \"Recovered Alcoholic\"    Substance Abuse Father         \"Recovered Alcoholic\"    Cancer Maternal Grandfather         prostate    Cancer Paternal Grandfather         liver    Hypertension Other         grandmother    Diabetes Other         grandmother       SOCIAL HISTORY  Social History     Socioeconomic History Marital status:    Tobacco Use    Smoking status: Never    Smokeless tobacco: Never   Vaping Use    Vaping Use: Never used   Substance and Sexual Activity    Alcohol use: Yes     Comment: 1 - 2 mixed drinks a week    Drug use: No    Sexual activity: Yes     Partners: Male        SURGICAL HISTORY  Past Surgical History:   Procedure Laterality Date    CARPAL TUNNEL RELEASE Left 11/8/2021    LEFT CARPAL TUNNEL RELEASE performed by Titus Christensen DO at [address] [place] [address]  10/16/1987    CHOLECYSTECTOMY, LAPAROSCOPIC  05/1996    ENDOSCOPY, COLON, DIAGNOSTIC  05/1996    SINUS ENDOSCOPY Bilateral 12/13/2018    SINUS ENDOSCOPY FUNCTIONAL SURGERY BILATERAL MAXILLARY ANSTROSTOMY WITH TISSUE REMOVAL performed by Darius Schneider MD at [facility] Bilateral 12/13/2018    SUBMUCOUS RESECTION TURBINOPLASTY  BILATERAL performed by Darius Schneider MD at [address] [place] [address]      4 [place] Teeth Extracted In Past                 CURRENT MEDICATIONS  Current Outpatient Medications   Medication Sig Dispense Refill    gabapentin (NEURONTIN) 300 MG capsule Take 1 capsule by mouth nightly for 30 days. Intended supply: 30 days 30 capsule 5    acetaminophen (TYLENOL) 500 MG tablet Take 500 mg by mouth every 6 hours as needed for Pain      traMADol (ULTRAM) 50 MG tablet Take 1 tablet by mouth 2 times daily as needed for Pain for up to 30 days.  60 tablet 0    traMADol (ULTRAM) 50 MG tablet Take 50 mg by mouth every 6 hours as needed for Pain.      metoprolol tartrate (LOPRESSOR) 25 MG tablet Take 1 tablet by mouth twice daily 180 tablet 0    omeprazole (PRILOSEC) 20 MG delayed release capsule Take 1 capsule by mouth every morning (before breakfast) 90 capsule 1    lisinopril (PRINIVIL;ZESTRIL) 20 MG tablet Take 1 tablet by mouth nightly 90 tablet 1    naproxen (NAPROSYN) 500 MG tablet Take 1 tablet by mouth 2 times daily 180 tablet 1    Ascorbic Acid (VITAMIN C) 1000 MG tablet Cholecalciferol (VITAMIN D3) 1.25 MG (90847 UT) TABS       vitamin B-6 (PYRIDOXINE) 100 MG tablet       GuaiFENesin (MUCINEX PO) Take 1,200 mg by mouth 2 times daily Over The Counter, \"I Take A Regular One In The Morning, I Take DM At Night\"       No current facility-administered medications for this visit. ALLERGIES  Allergies   Allergen Reactions    Ampicillin Anaphylaxis    Bactrim [Sulfamethoxazole-Trimethoprim] Anaphylaxis    Flagyl [Metronidazole]      \"Welts\"    Tape Juan Antonio Kareem Tape]      \"Tears The Skin\"       PHYSICAL EXAM    /76 (Site: Right Upper Arm, Position: Sitting, Cuff Size: Medium Adult)   Pulse 59   Ht 5' 3\" (1.6 m)   Wt 293 lb 9.6 oz (133.2 kg)   SpO2 96%   BMI 52.01 kg/m²     Physical Exam  HENT:      Right Ear: Tympanic membrane, ear canal and external ear normal. There is no impacted cerumen. Ears:      Comments: Left TM with clear effusion, lots of granulation tissue, appears abnormal still, possible cholesteatoma      ASSESSMENT & PLAN    1. Bilateral carpal tunnel syndrome    - gabapentin (NEURONTIN) 300 MG capsule; Take 1 capsule by mouth nightly for 30 days. Intended supply: 30 days  Dispense: 30 capsule; Refill: 5    2. Acute suppurative otitis media of left ear with spontaneous rupture of tympanic membrane, recurrence not specified  Tm appears to be healing some but still with fluid,   Concerned due to ongoing hearing issues  - Amb External Referral To ENT    3. Maxillary sinusitis, chronic    - Amb External Referral To ENT      Return if symptoms worsen or fail to improve. Electronically signed by LUCY Rooney on 1/3/2023      Comment: Please note this report has been produced using speech recognition software and may contain errors related to that system including errors in grammar, punctuation, and spelling, as well as words and phrases that may be inappropriate.  If there are any questions or concerns please feel free to contact the dictating provider for clarification.           no

## 2023-10-13 DIAGNOSIS — M25.562 LEFT KNEE PAIN, UNSPECIFIED CHRONICITY: Primary | ICD-10-CM

## 2023-10-16 ENCOUNTER — HOSPITAL ENCOUNTER (OUTPATIENT)
Dept: GENERAL RADIOLOGY | Age: 58
Discharge: HOME | End: 2023-10-16

## 2023-10-16 ENCOUNTER — HOSPITAL ENCOUNTER (OUTPATIENT)
Age: 58
Discharge: HOME | End: 2023-10-16

## 2023-10-16 ENCOUNTER — OFFICE VISIT (OUTPATIENT)
Dept: ORTHOPEDIC SURGERY | Age: 58
End: 2023-10-16

## 2023-10-16 VITALS — OXYGEN SATURATION: 98 % | WEIGHT: 293 LBS | BODY MASS INDEX: 51.91 KG/M2 | HEART RATE: 52 BPM | HEIGHT: 63 IN

## 2023-10-16 DIAGNOSIS — M17.12 PRIMARY OSTEOARTHRITIS OF LEFT KNEE: Primary | ICD-10-CM

## 2023-10-16 DIAGNOSIS — M25.562 LEFT KNEE PAIN, UNSPECIFIED CHRONICITY: ICD-10-CM

## 2023-10-16 PROCEDURE — 73564 X-RAY EXAM KNEE 4 OR MORE: CPT

## 2023-10-16 RX ORDER — TRIAMCINOLONE ACETONIDE 40 MG/ML
40 INJECTION, SUSPENSION INTRA-ARTICULAR; INTRAMUSCULAR ONCE
Status: COMPLETED | OUTPATIENT
Start: 2023-10-16 | End: 2023-10-16

## 2023-10-16 RX ADMIN — TRIAMCINOLONE ACETONIDE 40 MG: 40 INJECTION, SUSPENSION INTRA-ARTICULAR; INTRAMUSCULAR at 14:35

## 2023-10-16 NOTE — PATIENT INSTRUCTIONS
Continue to weight bear as tolerated  Continue range of motion  Ice and elevate as needed  Tylenol or Motrin for pain  Injection given into the left knee  No high impact activities for a least 24-48 hours  Follow up in 3 months. We are committed to providing you the best care possible. If you receive a survey after visiting one of our offices, please take time to share your experience concerning your physician office visit. These surveys are confidential and no health information about you is shared. We are eager to improve for you and we are counting on your feedback to help make that happen.

## 2023-10-17 ASSESSMENT — ENCOUNTER SYMPTOMS
NAUSEA: 0
VOMITING: 0
COUGH: 0
BACK PAIN: 0
VOICE CHANGE: 0
COLOR CHANGE: 0
SHORTNESS OF BREATH: 0
SORE THROAT: 0
WHEEZING: 0

## 2024-03-01 ENCOUNTER — PATIENT MESSAGE (OUTPATIENT)
Dept: FAMILY MEDICINE CLINIC | Age: 59
End: 2024-03-01

## 2024-03-01 DIAGNOSIS — K21.9 GASTROESOPHAGEAL REFLUX DISEASE, UNSPECIFIED WHETHER ESOPHAGITIS PRESENT: ICD-10-CM

## 2024-03-01 DIAGNOSIS — G56.03 BILATERAL CARPAL TUNNEL SYNDROME: ICD-10-CM

## 2024-03-01 DIAGNOSIS — I10 ESSENTIAL HYPERTENSION: ICD-10-CM

## 2024-03-04 RX ORDER — OMEPRAZOLE 20 MG/1
20 CAPSULE, DELAYED RELEASE ORAL
Qty: 90 CAPSULE | Refills: 1 | Status: SHIPPED | OUTPATIENT
Start: 2024-03-04

## 2024-03-04 RX ORDER — LISINOPRIL 10 MG/1
10 TABLET ORAL NIGHTLY
Qty: 90 TABLET | Refills: 3 | Status: SHIPPED | OUTPATIENT
Start: 2024-03-04 | End: 2025-02-27

## 2024-03-04 NOTE — TELEPHONE ENCOUNTER
From: Cris Gama  To: Dr. Devon Coates  Sent: 3/1/2024 7:20 PM EST  Subject: Omeprazole and Lisinopril    HI! Could you send prescriptions fpr Omeprazole, and Lisinopril (20mg) to [facility] just to my account, not to be filled? I'm working in [place], [place]. I can have those scripts moved to the [facility] here to be filled, until my contract is over. Thanks, Cris

## 2024-03-19 ENCOUNTER — OFFICE VISIT (OUTPATIENT)
Dept: FAMILY MEDICINE CLINIC | Age: 59
End: 2024-03-19

## 2024-03-19 VITALS
HEIGHT: 63 IN | BODY MASS INDEX: 51.91 KG/M2 | HEART RATE: 56 BPM | SYSTOLIC BLOOD PRESSURE: 124 MMHG | DIASTOLIC BLOOD PRESSURE: 72 MMHG | WEIGHT: 293 LBS | OXYGEN SATURATION: 99 %

## 2024-03-19 DIAGNOSIS — M25.551 PAIN OF BOTH HIP JOINTS: ICD-10-CM

## 2024-03-19 DIAGNOSIS — M25.552 PAIN OF BOTH HIP JOINTS: ICD-10-CM

## 2024-03-19 DIAGNOSIS — I10 ESSENTIAL HYPERTENSION: Primary | ICD-10-CM

## 2024-03-19 DIAGNOSIS — G56.03 BILATERAL CARPAL TUNNEL SYNDROME: ICD-10-CM

## 2024-03-19 PROCEDURE — 3078F DIAST BP <80 MM HG: CPT | Performed by: STUDENT IN AN ORGANIZED HEALTH CARE EDUCATION/TRAINING PROGRAM

## 2024-03-19 PROCEDURE — 3074F SYST BP LT 130 MM HG: CPT | Performed by: STUDENT IN AN ORGANIZED HEALTH CARE EDUCATION/TRAINING PROGRAM

## 2024-03-19 PROCEDURE — 99213 OFFICE O/P EST LOW 20 MIN: CPT | Performed by: STUDENT IN AN ORGANIZED HEALTH CARE EDUCATION/TRAINING PROGRAM

## 2024-03-19 RX ORDER — AMLODIPINE BESYLATE 5 MG/1
5 TABLET ORAL DAILY
Qty: 30 TABLET | Refills: 0 | Status: SHIPPED | OUTPATIENT
Start: 2024-03-19

## 2024-03-19 RX ORDER — NAPROXEN 500 MG/1
500 TABLET ORAL 2 TIMES DAILY
Qty: 180 TABLET | Refills: 1 | Status: SHIPPED | OUTPATIENT
Start: 2024-03-19

## 2024-03-19 RX ORDER — MIDODRINE HYDROCHLORIDE 2.5 MG/1
2.5-5 TABLET ORAL 2 TIMES DAILY PRN
Qty: 20 TABLET | Refills: 0 | Status: SHIPPED | OUTPATIENT
Start: 2024-03-19

## 2024-03-19 RX ORDER — TRAMADOL HYDROCHLORIDE 50 MG/1
50 TABLET ORAL 2 TIMES DAILY PRN
Qty: 60 TABLET | Refills: 0 | Status: SHIPPED | OUTPATIENT
Start: 2024-03-19 | End: 2024-04-18

## 2024-03-19 NOTE — PROGRESS NOTES
3/19/2024    Cris Gama    Chief Complaint   Patient presents with    Annual Exam       HPI  History was obtained from елена. Accompanied by .   Cris is a 58 y.o. female with a PMHx as listed below who presents today for annual exam.     Currently on lisinopril. She gets episodes of diaphresis reports improves with lisinopril.     She plans on obtaining insurance in June 1. Essential hypertension    2. Bilateral carpal tunnel syndrome    3. Pain of both hip joints             REVIEW OF SYMPTOMS    Review of Systems   Constitutional:  Negative for chills and fatigue.   HENT:  Negative for congestion and sore throat.    Respiratory:  Negative for shortness of breath and wheezing.    Cardiovascular:  Negative for chest pain and palpitations.   Gastrointestinal:  Negative for abdominal pain and nausea.   Genitourinary:  Negative for frequency and urgency.   Neurological:  Negative for light-headedness.       PAST MEDICAL HISTORY  Past Medical History:   Diagnosis Date    Acid reflux     Anxiety     Arthritis     \"Hands And Hips\"    Bronchitis Last Episode 2-18    Depression     Environmental allergies     Granulomatous hepatitis Dx 5-96    Heart palpitations     Hyperlipidemia     Hypertension     Pneumonia Dx 4-17    Shortness of breath on exertion     Teeth missing     Upper And Lower    Wears glasses     [place] teeth extracted     4 [place] Teeth Extracted In Past       FAMILY HISTORY  Family History   Problem Relation Age of Onset    Allergy (Severe) Mother     Other Mother         Fibromyalgia    High Blood Pressure Mother     Diabetes Father         \"Pre Diabetic\"    Alcohol Abuse Father         \"Recovered Alcoholic\"    Substance Abuse Father         \"Recovered Alcoholic\"    Cancer Maternal Grandfather         prostate    Cancer Paternal Grandfather         liver    Hypertension Other         grandmother    Diabetes Other         grandmother       SOCIAL HISTORY  Social History     Socioeconomic History

## 2024-03-20 ENCOUNTER — OFFICE VISIT (OUTPATIENT)
Dept: ORTHOPEDIC SURGERY | Age: 59
End: 2024-03-20

## 2024-03-20 VITALS — SYSTOLIC BLOOD PRESSURE: 140 MMHG | DIASTOLIC BLOOD PRESSURE: 90 MMHG | HEART RATE: 52 BPM | OXYGEN SATURATION: 98 %

## 2024-03-20 DIAGNOSIS — M17.12 PRIMARY OSTEOARTHRITIS OF LEFT KNEE: Primary | ICD-10-CM

## 2024-03-20 PROCEDURE — 3077F SYST BP >= 140 MM HG: CPT | Performed by: STUDENT IN AN ORGANIZED HEALTH CARE EDUCATION/TRAINING PROGRAM

## 2024-03-20 PROCEDURE — 99213 OFFICE O/P EST LOW 20 MIN: CPT | Performed by: STUDENT IN AN ORGANIZED HEALTH CARE EDUCATION/TRAINING PROGRAM

## 2024-03-20 PROCEDURE — 20610 DRAIN/INJ JOINT/BURSA W/O US: CPT | Performed by: STUDENT IN AN ORGANIZED HEALTH CARE EDUCATION/TRAINING PROGRAM

## 2024-03-20 PROCEDURE — 3080F DIAST BP >= 90 MM HG: CPT | Performed by: STUDENT IN AN ORGANIZED HEALTH CARE EDUCATION/TRAINING PROGRAM

## 2024-03-20 RX ORDER — TRIAMCINOLONE ACETONIDE 40 MG/ML
40 INJECTION, SUSPENSION INTRA-ARTICULAR; INTRAMUSCULAR ONCE
Status: COMPLETED | OUTPATIENT
Start: 2024-03-20 | End: 2024-03-20

## 2024-03-20 RX ADMIN — TRIAMCINOLONE ACETONIDE 40 MG: 40 INJECTION, SUSPENSION INTRA-ARTICULAR; INTRAMUSCULAR at 14:02

## 2024-03-20 ASSESSMENT — ENCOUNTER SYMPTOMS
VOICE CHANGE: 0
COLOR CHANGE: 0
SHORTNESS OF BREATH: 0
NAUSEA: 0
VOMITING: 0
COUGH: 0
SORE THROAT: 0
BACK PAIN: 0
WHEEZING: 0

## 2024-03-20 NOTE — PROGRESS NOTES
3/20/2024   Chief Complaint   Patient presents with    Knee Pain      Updated HPI: Patient returns today for reevaluation of her left knee as well as requesting cortisone injection.  She states that the injection was extremely helpful and she is looking for another injection today.  No complications from injection.  No changes in her health history or painful events or injuries since last being seen.  She denies any constitutional symptoms today    Previous HPI (10/16/2023)                             Cris Gama is a 58 y.o. female  referred by self, previously seen for shoulder by myself and here today for evaluation and treatment of left knee pain.  Patient denies any specific left knee injury but states she has had intermittent pain for approximately 10 years.  She states that she had a recent flareup over the summer and that her knee pain is been relatively consistent since then.  She has pain around the patella as well as the medial joint line.  She states she has had a previous cortisone injection by an outside physician several years ago and this was very helpful and she is requesting another injection today.  Again, no recent injury to the knee.  No advanced imaging thus far.  This my first time seeing her for this issue.  No other intervention to the knee for previous knee pain.      The pain's location is medial joint line as well as patellofemoral. she describes the symptoms as aching, sharp and stabbing. Symptoms improve with rest. Symptoms worsen with deep knee bending, getting up from a chair, weight bearing, sitting for prolonged periods of time, twisting activities.     Patient denies prior injury to knee, denies numbness, tingling, fever, chills.    Denies swelling or effusions.  Intermittent minimal mechanical symptoms. Denies instability.  Worse with increased activity. Better with rest,     Treatment thus far has included rest, activity modifications, CSI, oral medications and  without

## 2024-03-20 NOTE — PROGRESS NOTES
Last injection effective for ~5 months. Wants new left knee injection. No new injuries.     Left Lateral Infrapatetellar Injection Procedure Note   Pre-operative Diagnosis: Left knee osteoarthritis   Post-operative Diagnosis: same   Indications: Symptomatic relief of osteoarthritis   Anesthesia: Lidocaine 1% and marcaine 0.5% without epinephrine without added sodium bicarbonate   Procedure Details   Verbal consent was obtained for the procedure. The knee was prepped with alcohol. A 22 gauge needle was advanced into the lateral joint space through lateral infrapatellar approach without difficulty The space was then injected with 1 ml 1% lidocaine and 1 ml 0.5% marcaine and 1 ml of triamcinolone (KENALOG) 40mg/ml. The injection site was cleansed with isopropyl alcohol and a dressing was applied.   Complications: None; patient tolerated the procedure well. Symptoms were improved immediately after the injection.

## 2024-03-28 ASSESSMENT — ENCOUNTER SYMPTOMS
WHEEZING: 0
ABDOMINAL PAIN: 0
SORE THROAT: 0
NAUSEA: 0
SHORTNESS OF BREATH: 0

## 2024-04-24 ENCOUNTER — TELEPHONE (OUTPATIENT)
Dept: FAMILY MEDICINE CLINIC | Age: 59
End: 2024-04-24

## 2024-04-24 NOTE — TELEPHONE ENCOUNTER
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

## 2024-06-04 ENCOUNTER — COMMUNITY OUTREACH (OUTPATIENT)
Dept: FAMILY MEDICINE CLINIC | Age: 59
End: 2024-06-04

## 2024-06-07 ENCOUNTER — PATIENT MESSAGE (OUTPATIENT)
Dept: FAMILY MEDICINE CLINIC | Age: 59
End: 2024-06-07

## 2024-06-07 DIAGNOSIS — G56.03 BILATERAL CARPAL TUNNEL SYNDROME: ICD-10-CM

## 2024-06-07 RX ORDER — TRAMADOL HYDROCHLORIDE 50 MG/1
50 TABLET ORAL 2 TIMES DAILY PRN
Qty: 60 TABLET | Refills: 0 | Status: SHIPPED | OUTPATIENT
Start: 2024-06-07 | End: 2024-07-07

## 2024-06-07 NOTE — TELEPHONE ENCOUNTER
From: Cris Gama  To: Dr. Devon Coates  Sent: 6/7/2024 9:28 AM EDT  Subject: Tramadol    May I have a refill on my tramadol please and thank you? To [facility] in [place].      Thanks,  Cris

## 2024-06-20 DIAGNOSIS — I10 ESSENTIAL HYPERTENSION: ICD-10-CM

## 2024-06-20 NOTE — TELEPHONE ENCOUNTER
Cris L Craft  P [facility] Clinical Staff (supporting Radha Murillo MD)1 hour ago (1:50 PM)       Hello,  [facility] seems to have lost my lopressor refills. 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 I'm trying to fill it now, and neither the [facility] in [place], [place] or where I'm currently working in [place], [place] has it. They both say the script has been moved, but can't tell me where. I'm sorry for the inconvenience, but would you call some refills to [place], [place] (I had 2 left). Don't fill it, just put it on my account so I can transfer it to [place] to be filled. I know this sounds weird, so if you have any questions, my phone number is [phone]. Thank you so much for your time.     Cris Gama

## 2024-07-09 ENCOUNTER — PATIENT MESSAGE (OUTPATIENT)
Dept: FAMILY MEDICINE CLINIC | Age: 59
End: 2024-07-09

## 2024-07-09 DIAGNOSIS — G56.03 BILATERAL CARPAL TUNNEL SYNDROME: ICD-10-CM

## 2024-07-10 RX ORDER — GABAPENTIN 300 MG/1
300 CAPSULE ORAL NIGHTLY
Qty: 30 CAPSULE | Refills: 5 | Status: SHIPPED | OUTPATIENT
Start: 2024-07-10 | End: 2025-01-06

## 2024-07-10 NOTE — TELEPHONE ENCOUNTER
From: Cris Gama  To: Dr. Devon Coates  Sent: 7/9/2024 7:43 PM EDT  Subject: Gabapentin refill    Hi,    I'm still working in [place], but need my gabapentin refilled. Would you park a refill at [facility] in [place], and I will move it to [facility] here in [place]. Thank you so much!    Cris

## 2024-08-02 ENCOUNTER — OFFICE VISIT (OUTPATIENT)
Dept: FAMILY MEDICINE CLINIC | Age: 59
End: 2024-08-02
Payer: COMMERCIAL

## 2024-08-02 ENCOUNTER — OFFICE VISIT (OUTPATIENT)
Dept: ORTHOPEDIC SURGERY | Age: 59
End: 2024-08-02
Payer: COMMERCIAL

## 2024-08-02 VITALS
BODY MASS INDEX: 51.91 KG/M2 | HEART RATE: 59 BPM | SYSTOLIC BLOOD PRESSURE: 130 MMHG | OXYGEN SATURATION: 98 % | WEIGHT: 293 LBS | HEIGHT: 63 IN | DIASTOLIC BLOOD PRESSURE: 80 MMHG

## 2024-08-02 VITALS — HEIGHT: 62 IN | RESPIRATION RATE: 16 BRPM | BODY MASS INDEX: 54.14 KG/M2 | OXYGEN SATURATION: 99 % | HEART RATE: 57 BPM

## 2024-08-02 DIAGNOSIS — M25.551 PAIN OF BOTH HIP JOINTS: ICD-10-CM

## 2024-08-02 DIAGNOSIS — M25.552 PAIN OF BOTH HIP JOINTS: ICD-10-CM

## 2024-08-02 DIAGNOSIS — M17.12 PRIMARY OSTEOARTHRITIS OF LEFT KNEE: Primary | ICD-10-CM

## 2024-08-02 DIAGNOSIS — G56.03 BILATERAL CARPAL TUNNEL SYNDROME: ICD-10-CM

## 2024-08-02 DIAGNOSIS — K21.9 GASTROESOPHAGEAL REFLUX DISEASE, UNSPECIFIED WHETHER ESOPHAGITIS PRESENT: ICD-10-CM

## 2024-08-02 PROCEDURE — 20610 DRAIN/INJ JOINT/BURSA W/O US: CPT | Performed by: PHYSICIAN ASSISTANT

## 2024-08-02 PROCEDURE — 99213 OFFICE O/P EST LOW 20 MIN: CPT | Performed by: INTERNAL MEDICINE

## 2024-08-02 PROCEDURE — 3075F SYST BP GE 130 - 139MM HG: CPT | Performed by: INTERNAL MEDICINE

## 2024-08-02 PROCEDURE — 3079F DIAST BP 80-89 MM HG: CPT | Performed by: INTERNAL MEDICINE

## 2024-08-02 RX ORDER — OMEPRAZOLE 20 MG/1
20 CAPSULE, DELAYED RELEASE ORAL
Qty: 90 CAPSULE | Refills: 1 | Status: SHIPPED | OUTPATIENT
Start: 2024-08-02

## 2024-08-02 RX ORDER — TRAMADOL HYDROCHLORIDE 50 MG/1
50 TABLET ORAL EVERY 12 HOURS
COMMUNITY
End: 2024-08-02 | Stop reason: SDUPTHER

## 2024-08-02 RX ORDER — NAPROXEN 500 MG/1
500 TABLET ORAL 2 TIMES DAILY
Qty: 180 TABLET | Refills: 1 | Status: SHIPPED | OUTPATIENT
Start: 2024-08-02

## 2024-08-02 RX ORDER — LISINOPRIL 10 MG/1
10 TABLET ORAL DAILY
COMMUNITY

## 2024-08-02 RX ORDER — TRAMADOL HYDROCHLORIDE 50 MG/1
50 TABLET ORAL EVERY 12 HOURS
Qty: 60 TABLET | Refills: 2 | Status: SHIPPED | OUTPATIENT
Start: 2024-08-02 | End: 2024-10-31

## 2024-08-02 RX ORDER — TRIAMCINOLONE ACETONIDE 40 MG/ML
40 INJECTION, SUSPENSION INTRA-ARTICULAR; INTRAMUSCULAR ONCE
Status: COMPLETED | OUTPATIENT
Start: 2024-08-02 | End: 2024-08-02

## 2024-08-02 RX ADMIN — TRIAMCINOLONE ACETONIDE 40 MG: 40 INJECTION, SUSPENSION INTRA-ARTICULAR; INTRAMUSCULAR at 10:26

## 2024-08-02 SDOH — ECONOMIC STABILITY: INCOME INSECURITY: HOW HARD IS IT FOR YOU TO PAY FOR THE VERY BASICS LIKE FOOD, HOUSING, MEDICAL CARE, AND HEATING?: NOT HARD AT ALL

## 2024-08-02 SDOH — ECONOMIC STABILITY: FOOD INSECURITY: WITHIN THE PAST 12 MONTHS, YOU WORRIED THAT YOUR FOOD WOULD RUN OUT BEFORE YOU GOT MONEY TO BUY MORE.: NEVER TRUE

## 2024-08-02 SDOH — ECONOMIC STABILITY: FOOD INSECURITY: WITHIN THE PAST 12 MONTHS, THE FOOD YOU BOUGHT JUST DIDN'T LAST AND YOU DIDN'T HAVE MONEY TO GET MORE.: NEVER TRUE

## 2024-08-02 SDOH — ECONOMIC STABILITY: HOUSING INSECURITY
IN THE LAST 12 MONTHS, WAS THERE A TIME WHEN YOU DID NOT HAVE A STEADY PLACE TO SLEEP OR SLEPT IN A SHELTER (INCLUDING NOW)?: NO

## 2024-08-02 ASSESSMENT — PATIENT HEALTH QUESTIONNAIRE - PHQ9
10. IF YOU CHECKED OFF ANY PROBLEMS, HOW DIFFICULT HAVE THESE PROBLEMS MADE IT FOR YOU TO DO YOUR WORK, TAKE CARE OF THINGS AT HOME, OR GET ALONG WITH OTHER PEOPLE: SOMEWHAT DIFFICULT
SUM OF ALL RESPONSES TO PHQ QUESTIONS 1-9: 4
SUM OF ALL RESPONSES TO PHQ QUESTIONS 1-9: 4
6. FEELING BAD ABOUT YOURSELF - OR THAT YOU ARE A FAILURE OR HAVE LET YOURSELF OR YOUR FAMILY DOWN: NOT AT ALL
9. THOUGHTS THAT YOU WOULD BE BETTER OFF DEAD, OR OF HURTING YOURSELF: NOT AT ALL
7. TROUBLE CONCENTRATING ON THINGS, SUCH AS READING THE NEWSPAPER OR WATCHING TELEVISION: SEVERAL DAYS
1. LITTLE INTEREST OR PLEASURE IN DOING THINGS: NOT AT ALL
SUM OF ALL RESPONSES TO PHQ QUESTIONS 1-9: 4
SUM OF ALL RESPONSES TO PHQ QUESTIONS 1-9: 4
SUM OF ALL RESPONSES TO PHQ9 QUESTIONS 1 & 2: 0
3. TROUBLE FALLING OR STAYING ASLEEP: SEVERAL DAYS
4. FEELING TIRED OR HAVING LITTLE ENERGY: NOT AT ALL
5. POOR APPETITE OR OVEREATING: MORE THAN HALF THE DAYS
8. MOVING OR SPEAKING SO SLOWLY THAT OTHER PEOPLE COULD HAVE NOTICED. OR THE OPPOSITE, BEING SO FIGETY OR RESTLESS THAT YOU HAVE BEEN MOVING AROUND A LOT MORE THAN USUAL: NOT AT ALL
2. FEELING DOWN, DEPRESSED OR HOPELESS: NOT AT ALL

## 2024-08-02 ASSESSMENT — ENCOUNTER SYMPTOMS
RESPIRATORY NEGATIVE: 1
GASTROINTESTINAL NEGATIVE: 1
EYES NEGATIVE: 1

## 2024-08-02 NOTE — PROGRESS NOTES
Review of Systems   Constitutional: Negative.    HENT: Negative.     Eyes: Negative.    Respiratory: Negative.     Cardiovascular: Negative.    Gastrointestinal: Negative.    Genitourinary: Negative.    Musculoskeletal:  Positive for arthralgias and joint swelling.   Skin: Negative.    Neurological: Negative.    Psychiatric/Behavioral: Negative.           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  She states that the last injection that Dr. Mcgowan gave her in March really did not help that much but the 1 before that helped a lot.  She would like another injection in her left knee before she goes back to work.  She rates her pain at a 6 or 7/10, aching.    Past Medical History:   Diagnosis Date    Acid reflux     Anxiety     Arthritis     \"Hands And Hips\"    Bronchitis Last Episode 2-18    Depression     Environmental allergies     Granulomatous hepatitis Dx 5-96    Heart palpitations     Hyperlipidemia     Hypertension     Pneumonia Dx 4-17    Shortness of breath on exertion     Teeth missing     Upper And Lower    Wears glasses     [place] teeth extracted     4 [place] Teeth Extracted In Past       Past Surgical History:   Procedure Laterality Date    CARPAL TUNNEL RELEASE Left 2021    LEFT CARPAL TUNNEL RELEASE performed by Darrel Mcgowan DO at [facility] OR     SECTION  10/16/1987    CHOLECYSTECTOMY, LAPAROSCOPIC  1996    ENDOSCOPY, COLON, DIAGNOSTIC  1996    SINUS ENDOSCOPY Bilateral 2018    SINUS ENDOSCOPY FUNCTIONAL SURGERY BILATERAL MAXILLARY ANSTROSTOMY WITH TISSUE REMOVAL performed by Ivan Cunha MD at [facility] OR    TURBINOPLASTIES Bilateral 2018    SUBMUCOUS RESECTION TURBINOPLASTY  BILATERAL performed by Ivan Cunha MD at [facility] OR    WISDOM TOOTH EXTRACTION      4 [place] Teeth Extracted In Past       Family History   Problem Relation Age of Onset

## 2024-08-02 NOTE — PROGRESS NOTES
Outpatient Clinic Visit Note    Patient: Cris Gama  : 1965 (59 y.o.)  Date: 2024    CC:  Chief Complaint   Patient presents with    Medication Refill     Regular check up and refills        HPI: due for refills, no complaints.   She works as a traveling nurse.    Past Medical History:    Past Medical History:   Diagnosis Date    Acid reflux     Anxiety     Arthritis     \"Hands And Hips\"    Bronchitis Last Episode 2-18    Depression     Environmental allergies     Granulomatous hepatitis Dx 5-96    Heart palpitations     Hyperlipidemia     Hypertension     Pneumonia Dx 4-17    Shortness of breath on exertion     Teeth missing     Upper And Lower    Wears glasses     [place] teeth extracted     4 [place] Teeth Extracted In Past       Past Surgical History:  Past Surgical History:   Procedure Laterality Date    CARPAL TUNNEL RELEASE Left 2021    LEFT CARPAL TUNNEL RELEASE performed by Darrel Mcgowan DO at [facility] OR     SECTION  10/16/1987    CHOLECYSTECTOMY, LAPAROSCOPIC  1996    ENDOSCOPY, COLON, DIAGNOSTIC  1996    SINUS ENDOSCOPY Bilateral 2018    SINUS ENDOSCOPY FUNCTIONAL SURGERY BILATERAL MAXILLARY ANSTROSTOMY WITH TISSUE REMOVAL performed by Ivan Cunha MD at [facility] OR    TURBINOPLASTIES Bilateral 2018    SUBMUCOUS RESECTION TURBINOPLASTY  BILATERAL performed by Ivan Cunha MD at [facility] OR    WISDOM TOOTH EXTRACTION      4 [place] Teeth Extracted In Past       Home Medications:  Current Outpatient Medications   Medication Sig Dispense Refill    lisinopril (PRINIVIL;ZESTRIL) 10 MG tablet Take 1 tablet by mouth daily      traMADol (ULTRAM) 50 MG tablet Take 1 tablet by mouth every 12 hours for 90 days. Max Daily Amount: 100 mg 60 tablet 2    naproxen (NAPROSYN) 500 MG tablet Take 1 tablet by mouth 2 times daily 180 tablet 1    omeprazole (PRILOSEC) 20 MG delayed release capsule Take 1 capsule by mouth every morning (before breakfast) 90 capsule 1    gabapentin

## 2024-08-02 NOTE — PROGRESS NOTES
Patient seen in office today for LT knee injection 3/20/24. Last cortisone injection given by Dr. Mcgowan on 3/20/24. Stated the injection did not help. 3/10 pain level. Uses OTC medications and lidocaine patches to help alleviate. No new injuries since last office visit. Pain is globally in joint.

## 2024-08-02 NOTE — PATIENT INSTRUCTIONS
Continue weight-bearing as tolerated.  Continue range of motion exercises as instructed.  Ice and elevate as needed.  Tylenol or Motrin for pain.  Follow up as needed   Injection given today left knee     We are committed to providing you the best care possible.     If you receive a survey after visiting one of our offices, please take time to share your experience concerning your physician office visit.  These surveys are confidential and no health information about you is shared.     We are eager to improve for you and we are counting on your feedback to help make that happen.

## 2024-09-06 ENCOUNTER — WALK IN (OUTPATIENT)
Dept: URGENT CARE | Age: 59
End: 2024-09-06

## 2024-09-06 VITALS
OXYGEN SATURATION: 96 % | TEMPERATURE: 97.8 F | SYSTOLIC BLOOD PRESSURE: 118 MMHG | WEIGHT: 288 LBS | RESPIRATION RATE: 17 BRPM | DIASTOLIC BLOOD PRESSURE: 76 MMHG | HEART RATE: 64 BPM

## 2024-09-06 DIAGNOSIS — J40 SINOBRONCHITIS: Primary | ICD-10-CM

## 2024-09-06 DIAGNOSIS — J32.9 SINOBRONCHITIS: Primary | ICD-10-CM

## 2024-09-06 RX ORDER — LISINOPRIL 10 MG/1
10 TABLET ORAL DAILY
COMMUNITY

## 2024-09-06 RX ORDER — MULTIVIT WITH MINERALS/LUTEIN
1000 TABLET ORAL DAILY
COMMUNITY

## 2024-09-06 RX ORDER — BENZONATATE 200 MG/1
200 CAPSULE ORAL 3 TIMES DAILY PRN
Qty: 30 CAPSULE | Refills: 0 | Status: SHIPPED | OUTPATIENT
Start: 2024-09-06

## 2024-09-06 RX ORDER — GUAIFENESIN 600 MG/1
1200 TABLET, EXTENDED RELEASE ORAL 2 TIMES DAILY
COMMUNITY

## 2024-09-06 RX ORDER — NAPROXEN SODIUM 220 MG
220 TABLET ORAL 2 TIMES DAILY WITH MEALS
COMMUNITY

## 2024-09-06 RX ORDER — METOPROLOL TARTRATE 25 MG/1
25 TABLET, FILM COATED ORAL 2 TIMES DAILY
COMMUNITY

## 2024-09-06 RX ORDER — GABAPENTIN 300 MG/1
300 CAPSULE ORAL 3 TIMES DAILY
COMMUNITY

## 2024-09-06 RX ORDER — MULTIVITAMIN WITH IRON
100 TABLET ORAL 2 TIMES DAILY
COMMUNITY

## 2024-09-06 RX ORDER — DOXYCYCLINE HYCLATE 100 MG
100 TABLET ORAL 2 TIMES DAILY
Qty: 20 TABLET | Refills: 0 | Status: SHIPPED | OUTPATIENT
Start: 2024-09-06 | End: 2024-09-16

## 2024-10-01 NOTE — PROGRESS NOTES
816 pt recd from OR to [facility] room 18, report recd at bedside, call light in reach. Head of bed changed to semi cook. Pt denies pain, states left hand is numb.  at bedside,  821 ice chips provided  840 soda provided and konstantin crackers provided, discharge instructions reviewed.  Verbalized understanding  900 pt dressing  910 ambulated to bathroom  918 discharged to car via wheelchair (V5) oriented

## 2024-11-11 ENCOUNTER — OFFICE VISIT (OUTPATIENT)
Dept: ORTHOPEDIC SURGERY | Age: 59
End: 2024-11-11
Payer: COMMERCIAL

## 2024-11-11 DIAGNOSIS — M17.12 PRIMARY OSTEOARTHRITIS OF LEFT KNEE: Primary | ICD-10-CM

## 2024-11-11 PROCEDURE — 99213 OFFICE O/P EST LOW 20 MIN: CPT | Performed by: STUDENT IN AN ORGANIZED HEALTH CARE EDUCATION/TRAINING PROGRAM

## 2024-11-11 ASSESSMENT — ENCOUNTER SYMPTOMS
VOMITING: 0
BACK PAIN: 0
SHORTNESS OF BREATH: 0
COLOR CHANGE: 0
VOICE CHANGE: 0
WHEEZING: 0
NAUSEA: 0
COUGH: 0
SORE THROAT: 0

## 2024-11-11 NOTE — PATIENT INSTRUCTIONS
Continue weight-bearing as tolerated.  Continue range of motion exercises as instructed.  Ice and elevate as needed.  Tylenol or Motrin for pain.    Follow up in 3 months. In 6 weeks, call to let us know how you are feeling. If injections do not help then call us and we will try to get gel injections approved through insurance

## 2024-11-11 NOTE — PROGRESS NOTES
in a skeletally immature patient demonstrates moderate osteoarthritic changes seen at the medial compartment of the left knee with significant joint space loss, minimally worsened since most recent imaging.  No significant osteophyte formation.  Overall alignment is neutral.  Bone quality is good.  No sign of any acute findings such as fracture or soft tissue avulsion type injury.    Previous imagin views of the left knee in a skeletally immature patient demonstrates moderate osteoarthritic changes seen at the medial compartment of the left knee with significant joint space loss.  No significant osteophyte formation.  Overall alignment is neutral.  Bone quality is good.  No sign of any acute findings such as fracture or soft tissue avulsion type injury.      Office Procedures:  No orders of the defined types were placed in this encounter.    Left Lateral Infrapatetellar Injection Procedure Note   Pre-operative Diagnosis: Left knee osteoarthritis   Post-operative Diagnosis: same   Indications: Symptomatic relief of osteoarthritis   Anesthesia: Lidocaine 1% and marcaine 0.5% without epinephrine without added sodium bicarbonate   Procedure Details   Verbal consent was obtained for the procedure. The knee was prepped with alcohol. A 22 gauge needle was advanced into the lateral joint space through lateral infrapatellar approach without difficulty The space was then injected with 1 ml 1% lidocaine and 1 ml 0.5% marcaine and 2ml of triamcinolone (KENALOG) 40mg/ml. The injection site was cleansed with isopropyl alcohol and a dressing was applied.   Complications: None; patient tolerated the procedure well. Symptoms were improved immediately after the injection.     Assessment and Plan    A: Left knee osteoarthritis    P:   I had a thorough conversation the patient regarding her current physical samplings and treatment course.  I explained that because she did get at least 6 weeks relief and it was substantial relief

## 2024-12-03 ENCOUNTER — TELEPHONE (OUTPATIENT)
Dept: FAMILY MEDICINE CLINIC | Age: 59
End: 2024-12-03

## 2024-12-16 ENCOUNTER — OFFICE VISIT (OUTPATIENT)
Dept: FAMILY MEDICINE CLINIC | Age: 59
End: 2024-12-16
Payer: COMMERCIAL

## 2024-12-16 VITALS
OXYGEN SATURATION: 98 % | BODY MASS INDEX: 53.92 KG/M2 | WEIGHT: 293 LBS | SYSTOLIC BLOOD PRESSURE: 128 MMHG | HEART RATE: 53 BPM | HEIGHT: 62 IN | DIASTOLIC BLOOD PRESSURE: 68 MMHG

## 2024-12-16 DIAGNOSIS — E78.2 MODERATE MIXED HYPERLIPIDEMIA NOT REQUIRING STATIN THERAPY: ICD-10-CM

## 2024-12-16 DIAGNOSIS — I10 ESSENTIAL HYPERTENSION: Primary | ICD-10-CM

## 2024-12-16 DIAGNOSIS — G56.03 BILATERAL CARPAL TUNNEL SYNDROME: ICD-10-CM

## 2024-12-16 DIAGNOSIS — Z12.31 ENCOUNTER FOR SCREENING MAMMOGRAM FOR MALIGNANT NEOPLASM OF BREAST: ICD-10-CM

## 2024-12-16 PROCEDURE — 3074F SYST BP LT 130 MM HG: CPT | Performed by: STUDENT IN AN ORGANIZED HEALTH CARE EDUCATION/TRAINING PROGRAM

## 2024-12-16 PROCEDURE — 99214 OFFICE O/P EST MOD 30 MIN: CPT | Performed by: STUDENT IN AN ORGANIZED HEALTH CARE EDUCATION/TRAINING PROGRAM

## 2024-12-16 PROCEDURE — 3078F DIAST BP <80 MM HG: CPT | Performed by: STUDENT IN AN ORGANIZED HEALTH CARE EDUCATION/TRAINING PROGRAM

## 2024-12-16 RX ORDER — GABAPENTIN 300 MG/1
300 CAPSULE ORAL NIGHTLY
Qty: 30 CAPSULE | Refills: 5 | Status: SHIPPED | OUTPATIENT
Start: 2024-12-16 | End: 2025-06-14

## 2024-12-16 RX ORDER — LISINOPRIL 10 MG/1
10 TABLET ORAL DAILY
Qty: 90 TABLET | Refills: 1 | Status: SHIPPED | OUTPATIENT
Start: 2024-12-16 | End: 2025-06-14

## 2024-12-16 NOTE — PROGRESS NOTES
12/16/2024    Cris Gama    Chief Complaint   Patient presents with    3 Month Follow-Up     HTN    Medication Refill       HPI  Cris is a 59 y.o. female with a PMHx as listed below who presents today for follow up on chronic conditions.     Recently had cortisone injections, helped with carpal tunnel pain. Considering surgery.   History of Present Illness        1. Essential hypertension    2. Bilateral carpal tunnel syndrome    3. Encounter for screening mammogram for malignant neoplasm of breast    4. Moderate mixed hyperlipidemia not requiring statin therapy             REVIEW OF SYMPTOMS    Review of Systems   Constitutional:  Negative for chills and fatigue.   HENT:  Negative for congestion and sore throat.    Respiratory:  Negative for shortness of breath and wheezing.    Cardiovascular:  Negative for chest pain and palpitations.   Gastrointestinal:  Negative for abdominal pain and nausea.   Genitourinary:  Negative for frequency and urgency.   Neurological:  Negative for light-headedness.       PAST MEDICAL HISTORY  Past Medical History:   Diagnosis Date    Acid reflux     Anxiety     Arthritis     \"Hands And Hips\"    Bronchitis Last Episode 2-18    Depression     Environmental allergies     Granulomatous hepatitis Dx 5-96    Heart palpitations     Hyperlipidemia     Hypertension     Pneumonia Dx 4-17    Shortness of breath on exertion     Teeth missing     Upper And Lower    Wears glasses     [place] teeth extracted     4 [place] Teeth Extracted In Past       FAMILY HISTORY  Family History   Problem Relation Age of Onset    Allergy (Severe) Mother     Other Mother         Fibromyalgia    High Blood Pressure Mother     Diabetes Father         \"Pre Diabetic\"    Alcohol Abuse Father         \"Recovered Alcoholic\"    Substance Abuse Father         \"Recovered Alcoholic\"    Cancer Maternal Grandfather         prostate    Cancer Paternal Grandfather         liver    Hypertension Other         grandmother

## 2024-12-23 ENCOUNTER — OFFICE VISIT (OUTPATIENT)
Dept: CARDIOLOGY CLINIC | Age: 59
End: 2024-12-23
Payer: COMMERCIAL

## 2024-12-23 VITALS
BODY MASS INDEX: 53.92 KG/M2 | DIASTOLIC BLOOD PRESSURE: 76 MMHG | WEIGHT: 293 LBS | HEART RATE: 50 BPM | SYSTOLIC BLOOD PRESSURE: 144 MMHG | HEIGHT: 62 IN

## 2024-12-23 DIAGNOSIS — I47.10 PSVT (PAROXYSMAL SUPRAVENTRICULAR TACHYCARDIA) (HCC): ICD-10-CM

## 2024-12-23 DIAGNOSIS — R94.31 ABNORMAL EKG: ICD-10-CM

## 2024-12-23 DIAGNOSIS — R00.2 INTERMITTENT PALPITATIONS: ICD-10-CM

## 2024-12-23 DIAGNOSIS — R00.1 SINUS BRADYCARDIA: ICD-10-CM

## 2024-12-23 DIAGNOSIS — I10 ESSENTIAL HYPERTENSION: Primary | ICD-10-CM

## 2024-12-23 DIAGNOSIS — R01.1 SYSTOLIC EJECTION MURMUR: ICD-10-CM

## 2024-12-23 PROBLEM — E78.49 OTHER HYPERLIPIDEMIA: Status: RESOLVED | Noted: 2020-09-17 | Resolved: 2024-12-23

## 2024-12-23 PROCEDURE — 3077F SYST BP >= 140 MM HG: CPT | Performed by: INTERNAL MEDICINE

## 2024-12-23 PROCEDURE — 3078F DIAST BP <80 MM HG: CPT | Performed by: INTERNAL MEDICINE

## 2024-12-23 PROCEDURE — 93000 ELECTROCARDIOGRAM COMPLETE: CPT | Performed by: INTERNAL MEDICINE

## 2024-12-23 PROCEDURE — 99214 OFFICE O/P EST MOD 30 MIN: CPT | Performed by: INTERNAL MEDICINE

## 2024-12-23 RX ORDER — METOPROLOL TARTRATE 25 MG/1
25 TABLET, FILM COATED ORAL 2 TIMES DAILY
Qty: 180 TABLET | Refills: 3 | Status: SHIPPED | OUTPATIENT
Start: 2024-12-23

## 2024-12-23 NOTE — PROGRESS NOTES
Value Date/Time    TRIG 150 09/13/2021 10:25 AM    HDL 59 09/13/2021 10:25 AM    LDLDIRECT 168 09/13/2021 10:25 AM     TSH:    Lab Results   Component Value Date/Time    TSH 2.920 09/15/2018 06:15 PM     Sinus tachycardia 109/min  Low voltage QRS, consider pulmonary disease, pericardial effusion, or normal variant  Borderline ECG  No previous ECGs available  Confirmed by DEEJAY MARTÍNEZ MD ([id number]) on 9/16/2018 6:57:51 PM         CONCLUSION:  This is a normal sinus rhythm.  Average heart rate 82 beats  per minute, ranging from 49 to 150 beats per minute.  No significant pause  of more than 1.5 seconds noted.  Rare ventricular ectopic beats are  isolated and couplets total of 24.  Rare supraventricular ectopic beats,  total of 134 isolated and one supraventricular ectopic run composed of 3  beats, rate 168 beats per minute.  No other form of arrhythmia.  Overall,  this is a benign Holter monitor finding.  Diary was presented.  This Holter  does not explain the palpitation of the patient.     DEEJAY MARTÍNEZ M.D.     D: 09/24/2018 18:39:40       EKG5/2/19  NSR, no acute abn      The patient's symptoms of  palpitation correlated with the isolated ventricular ectopy.  Otherwise,  there was no sustained arrhythmia, pretty much patient almost recorded  every single isolated PVC.     CONCLUSION:  1.  Sinus rhythm.  2.  The patient's symptoms correlated very nicely with isolated PVCs and  sometimes PACs.  Otherwise, there was no sustained arrhythmias.      MYCHAL BALTAZAR M.D.     D: 06/03/2019 7:08:10         ekg 12/7/21  Sinus  Bradycardia   Low voltage in precordial leads.    - Extensive anterior-lateral infarct  Old.    -  Nonspecific T-abnormality.   ABNORMAL     Ekg 12/15/22  Sinus  Bradycardia   Low voltage in precordial leads.    -Old anterior infarct.     ABNORMAL     Ekg 12/21/23  Sinus Bradycardia  55 bpm  Low voltage in precordial leads.  -RSR(V1) -nondiagnostic.  -Old anterior infarct.      EKG 12/23/24  Sinus

## 2025-01-06 ASSESSMENT — ENCOUNTER SYMPTOMS
ABDOMINAL PAIN: 0
SORE THROAT: 0
NAUSEA: 0
WHEEZING: 0
SHORTNESS OF BREATH: 0

## 2025-01-07 ENCOUNTER — PATIENT MESSAGE (OUTPATIENT)
Dept: FAMILY MEDICINE CLINIC | Age: 60
End: 2025-01-07

## 2025-01-07 DIAGNOSIS — M25.551 PAIN OF BOTH HIP JOINTS: ICD-10-CM

## 2025-01-07 DIAGNOSIS — M25.552 PAIN OF BOTH HIP JOINTS: ICD-10-CM

## 2025-01-07 RX ORDER — TRAMADOL HYDROCHLORIDE 50 MG/1
50 TABLET ORAL EVERY 12 HOURS
Qty: 60 TABLET | Refills: 0 | Status: SHIPPED | OUTPATIENT
Start: 2025-01-07 | End: 2025-02-06

## 2025-02-10 ENCOUNTER — OFFICE VISIT (OUTPATIENT)
Dept: ORTHOPEDIC SURGERY | Age: 60
End: 2025-02-10
Payer: COMMERCIAL

## 2025-02-10 VITALS — BODY MASS INDEX: 53.22 KG/M2 | WEIGHT: 291 LBS | OXYGEN SATURATION: 97 % | HEART RATE: 53 BPM

## 2025-02-10 DIAGNOSIS — M17.12 PRIMARY OSTEOARTHRITIS OF LEFT KNEE: Primary | ICD-10-CM

## 2025-02-10 PROCEDURE — 99213 OFFICE O/P EST LOW 20 MIN: CPT | Performed by: STUDENT IN AN ORGANIZED HEALTH CARE EDUCATION/TRAINING PROGRAM

## 2025-02-10 PROCEDURE — 20611 DRAIN/INJ JOINT/BURSA W/US: CPT | Performed by: STUDENT IN AN ORGANIZED HEALTH CARE EDUCATION/TRAINING PROGRAM

## 2025-02-10 RX ORDER — TRIAMCINOLONE ACETONIDE 40 MG/ML
80 INJECTION, SUSPENSION INTRA-ARTICULAR; INTRAMUSCULAR ONCE
Status: COMPLETED | OUTPATIENT
Start: 2025-02-10 | End: 2025-02-10

## 2025-02-10 RX ADMIN — TRIAMCINOLONE ACETONIDE 80 MG: 40 INJECTION, SUSPENSION INTRA-ARTICULAR; INTRAMUSCULAR at 11:36

## 2025-02-10 ASSESSMENT — ENCOUNTER SYMPTOMS
VOICE CHANGE: 0
NAUSEA: 0
BACK PAIN: 0
WHEEZING: 0
COLOR CHANGE: 0
SHORTNESS OF BREATH: 0
SORE THROAT: 0
VOMITING: 0
COUGH: 0

## 2025-02-10 NOTE — PROGRESS NOTES
Pt comes in today for a 3 month FU s/p LT knee 2-1-1 injection on 11/11/24. She states that the injection helped for about 6-8 wks. Pain is 4-5/10 today. She denies any new falls or injures. She would like another injection today.   
None     Discoloration: none       TENDERNESS / CREPITUS (T / C):      Patella + / +     Lateral joint line - / -  Medial joint line  + / -     Peripatellar lateral - / -  Peripatellar medial  - / -     Medial plica - / -  Lateral plica - / -    Patellar tendon - / -   Prepatellar Bursa - / -     Popliteal fossa - / -    Gastrocnemius - / -    Quadricep - / -   Quad tendon - / -      Tibial tubercle - / -     Thigh/hamstring - / -  Pes anserine/HS - / -     ITB - / -     Tibia - / -   Fibula - / -    Tib/fib joint - / -     MFC - / -    LFC - / -     MCL: Proximal - / -  Distal - / -    LCL - / -    MCL - / -      ROM: (“*” = pain)  PASSIVE  ACTIVE     Left :    0 / 125  0 / 125      Right :    0 / 125  0 / 125      PATELLOFEMORAL EXAMINATION:    See above noted areas of tenderness.     Patella position     Subluxation / dislocation: Centered     Sup. / Inf; Normal     Crepitus (PF): Moderate    Patellar Mobility:     Medial-lateral: Normal     Superior-inferior: Normal     Inferior tilt Normal     Patellar tendon: Normal     Lateral tilt: Normal     J-sign: None     Patellofemoral grind: Minimal pain         MENISCAL SIGNS:     Pain on terminal extension: -    Pain on terminal flexion: -    Toya’s maneuver: -     Squat: Not tested        LIGAMENT EXAMINATION:    ACL / Lachman: normal (-1 to 2mm)      PCL-Post.  drawer: normal 0 to 2mm    MCL- Valgus: normal 0 to 2mm    LCL- Varus:  normal 0 to 2mm          STRENGTH: (“*” = with pain) PAINFUL SIDE    Quadricep 5/5    Hamstrin/5      EXTREMITY NEURO-VASCULAR EXAMINATION:     Sensation:  Grossly intact to light touch all dermatomal regions.     Motor Function:  Fully intact motor function at hip, knee, foot and ankle      DTR’s;  quadriceps and  achilles 2+.  No clonus and downgoing Babinski.      Vascular status:  DP and PT pulses 2+, brisk capillary refill, symmetric.      Diagnostic testing:  X-ray images were reviewed by myself and discussed with the

## 2025-03-17 ENCOUNTER — PATIENT MESSAGE (OUTPATIENT)
Dept: FAMILY MEDICINE CLINIC | Age: 60
End: 2025-03-17

## 2025-03-17 DIAGNOSIS — K21.9 GASTROESOPHAGEAL REFLUX DISEASE, UNSPECIFIED WHETHER ESOPHAGITIS PRESENT: ICD-10-CM

## 2025-03-17 DIAGNOSIS — M25.551 PAIN OF BOTH HIP JOINTS: ICD-10-CM

## 2025-03-17 DIAGNOSIS — M25.552 PAIN OF BOTH HIP JOINTS: ICD-10-CM

## 2025-03-17 DIAGNOSIS — G56.03 BILATERAL CARPAL TUNNEL SYNDROME: ICD-10-CM

## 2025-03-18 RX ORDER — TRAMADOL HYDROCHLORIDE 50 MG/1
50 TABLET ORAL EVERY 12 HOURS
Qty: 60 TABLET | Refills: 0 | Status: SHIPPED | OUTPATIENT
Start: 2025-03-18 | End: 2025-04-17

## 2025-03-18 RX ORDER — OMEPRAZOLE 20 MG/1
20 CAPSULE, DELAYED RELEASE ORAL
Qty: 90 CAPSULE | Refills: 1 | Status: SHIPPED | OUTPATIENT
Start: 2025-03-18

## 2025-04-22 ENCOUNTER — COMMUNITY OUTREACH (OUTPATIENT)
Dept: FAMILY MEDICINE CLINIC | Age: 60
End: 2025-04-22

## 2025-04-22 NOTE — PROGRESS NOTES
Patient's HM shows they are overdue for Colorectal Screening and mammogram.   Care Everywhere and  files searched.  No results to attach to order nor HM updated.     No recent colonoscopy discussion.  Mammogram ordered 12/16/24, called x3. No return call to schedule.

## 2025-04-24 ENCOUNTER — OFFICE VISIT (OUTPATIENT)
Dept: FAMILY MEDICINE CLINIC | Age: 60
End: 2025-04-24
Payer: COMMERCIAL

## 2025-04-24 VITALS
OXYGEN SATURATION: 99 % | WEIGHT: 291.5 LBS | DIASTOLIC BLOOD PRESSURE: 98 MMHG | SYSTOLIC BLOOD PRESSURE: 132 MMHG | HEART RATE: 51 BPM | BODY MASS INDEX: 53.64 KG/M2 | HEIGHT: 62 IN

## 2025-04-24 DIAGNOSIS — Z12.31 ENCOUNTER FOR SCREENING MAMMOGRAM FOR MALIGNANT NEOPLASM OF BREAST: ICD-10-CM

## 2025-04-24 DIAGNOSIS — R53.83 OTHER FATIGUE: ICD-10-CM

## 2025-04-24 DIAGNOSIS — M25.551 PAIN OF BOTH HIP JOINTS: ICD-10-CM

## 2025-04-24 DIAGNOSIS — E66.813 CLASS 3 SEVERE OBESITY WITH SERIOUS COMORBIDITY AND BODY MASS INDEX (BMI) OF 40.0 TO 44.9 IN ADULT, UNSPECIFIED OBESITY TYPE (HCC): ICD-10-CM

## 2025-04-24 DIAGNOSIS — R73.01 IMPAIRED FASTING BLOOD SUGAR: ICD-10-CM

## 2025-04-24 DIAGNOSIS — E78.2 MODERATE MIXED HYPERLIPIDEMIA NOT REQUIRING STATIN THERAPY: ICD-10-CM

## 2025-04-24 DIAGNOSIS — I10 ESSENTIAL HYPERTENSION: ICD-10-CM

## 2025-04-24 DIAGNOSIS — M25.552 PAIN OF BOTH HIP JOINTS: ICD-10-CM

## 2025-04-24 DIAGNOSIS — Z12.11 COLON CANCER SCREENING: ICD-10-CM

## 2025-04-24 DIAGNOSIS — R73.01 IMPAIRED FASTING BLOOD SUGAR: Primary | ICD-10-CM

## 2025-04-24 LAB
25(OH)D3 SERPL-MCNC: 69.9 NG/ML
ALBUMIN SERPL-MCNC: 3.7 G/DL (ref 3.4–5)
ALBUMIN/GLOB SERPL: 1.5 {RATIO} (ref 1.1–2.2)
ALP SERPL-CCNC: 89 U/L (ref 40–129)
ALT SERPL-CCNC: 16 U/L (ref 10–40)
ANION GAP SERPL CALCULATED.3IONS-SCNC: 10 MMOL/L (ref 3–16)
AST SERPL-CCNC: 27 U/L (ref 15–37)
BASOPHILS # BLD: 0.1 K/UL (ref 0–0.2)
BASOPHILS NFR BLD: 0.9 %
BILIRUB SERPL-MCNC: 0.4 MG/DL (ref 0–1)
BUN SERPL-MCNC: 20 MG/DL (ref 7–20)
CALCIUM SERPL-MCNC: 9.3 MG/DL (ref 8.3–10.6)
CHLORIDE SERPL-SCNC: 107 MMOL/L (ref 99–110)
CHOLEST SERPL-MCNC: 236 MG/DL (ref 0–199)
CO2 SERPL-SCNC: 26 MMOL/L (ref 21–32)
CREAT SERPL-MCNC: 0.8 MG/DL (ref 0.6–1.2)
DEPRECATED RDW RBC AUTO: 15.1 % (ref 12.4–15.4)
EOSINOPHIL # BLD: 0.2 K/UL (ref 0–0.6)
EOSINOPHIL NFR BLD: 2.3 %
EST. AVERAGE GLUCOSE BLD GHB EST-MCNC: 108.3 MG/DL
FERRITIN SERPL IA-MCNC: 49.4 NG/ML (ref 15–150)
FOLATE SERPL-MCNC: 12.4 NG/ML (ref 4.78–24.2)
GFR SERPLBLD CREATININE-BSD FMLA CKD-EPI: 84 ML/MIN/{1.73_M2}
GLUCOSE SERPL-MCNC: 92 MG/DL (ref 70–99)
HBA1C MFR BLD: 5.4 %
HCT VFR BLD AUTO: 34.2 % (ref 36–48)
HDLC SERPL-MCNC: 61 MG/DL (ref 40–60)
HGB BLD-MCNC: 11.8 G/DL (ref 12–16)
IRON SATN MFR SERPL: 25 % (ref 15–50)
IRON SERPL-MCNC: 75 UG/DL (ref 37–145)
LDL CHOLESTEROL: 157 MG/DL
LYMPHOCYTES # BLD: 1.2 K/UL (ref 1–5.1)
LYMPHOCYTES NFR BLD: 18.1 %
MCH RBC QN AUTO: 30.9 PG (ref 26–34)
MCHC RBC AUTO-ENTMCNC: 34.4 G/DL (ref 31–36)
MCV RBC AUTO: 89.8 FL (ref 80–100)
MONOCYTES # BLD: 0.6 K/UL (ref 0–1.3)
MONOCYTES NFR BLD: 8.9 %
NEUTROPHILS # BLD: 4.5 K/UL (ref 1.7–7.7)
NEUTROPHILS NFR BLD: 69.8 %
PLATELET # BLD AUTO: 290 K/UL (ref 135–450)
PMV BLD AUTO: 7.8 FL (ref 5–10.5)
POTASSIUM SERPL-SCNC: 5 MMOL/L (ref 3.5–5.1)
PROT SERPL-MCNC: 6.1 G/DL (ref 6.4–8.2)
RBC # BLD AUTO: 3.81 M/UL (ref 4–5.2)
SODIUM SERPL-SCNC: 143 MMOL/L (ref 136–145)
TIBC SERPL-MCNC: 295 UG/DL (ref 260–445)
TRIGL SERPL-MCNC: 88 MG/DL (ref 0–150)
TSH SERPL DL<=0.005 MIU/L-ACNC: 2.28 UIU/ML (ref 0.27–4.2)
VIT B12 SERPL-MCNC: 546 PG/ML (ref 211–911)
VLDLC SERPL CALC-MCNC: 18 MG/DL
WBC # BLD AUTO: 6.5 K/UL (ref 4–11)

## 2025-04-24 PROCEDURE — 3075F SYST BP GE 130 - 139MM HG: CPT | Performed by: STUDENT IN AN ORGANIZED HEALTH CARE EDUCATION/TRAINING PROGRAM

## 2025-04-24 PROCEDURE — 3080F DIAST BP >= 90 MM HG: CPT | Performed by: STUDENT IN AN ORGANIZED HEALTH CARE EDUCATION/TRAINING PROGRAM

## 2025-04-24 PROCEDURE — 99214 OFFICE O/P EST MOD 30 MIN: CPT | Performed by: STUDENT IN AN ORGANIZED HEALTH CARE EDUCATION/TRAINING PROGRAM

## 2025-04-24 RX ORDER — NAPROXEN 500 MG/1
500 TABLET ORAL 2 TIMES DAILY
Qty: 180 TABLET | Refills: 1 | Status: SHIPPED | OUTPATIENT
Start: 2025-04-24

## 2025-04-24 RX ORDER — LISINOPRIL 10 MG/1
10 TABLET ORAL DAILY
Qty: 90 TABLET | Refills: 1
Start: 2025-04-24 | End: 2025-10-21

## 2025-04-24 SDOH — ECONOMIC STABILITY: FOOD INSECURITY: WITHIN THE PAST 12 MONTHS, THE FOOD YOU BOUGHT JUST DIDN'T LAST AND YOU DIDN'T HAVE MONEY TO GET MORE.: NEVER TRUE

## 2025-04-24 SDOH — ECONOMIC STABILITY: FOOD INSECURITY: WITHIN THE PAST 12 MONTHS, YOU WORRIED THAT YOUR FOOD WOULD RUN OUT BEFORE YOU GOT MONEY TO BUY MORE.: NEVER TRUE

## 2025-04-24 ASSESSMENT — PATIENT HEALTH QUESTIONNAIRE - PHQ9
7. TROUBLE CONCENTRATING ON THINGS, SUCH AS READING THE NEWSPAPER OR WATCHING TELEVISION: NOT AT ALL
8. MOVING OR SPEAKING SO SLOWLY THAT OTHER PEOPLE COULD HAVE NOTICED. OR THE OPPOSITE, BEING SO FIGETY OR RESTLESS THAT YOU HAVE BEEN MOVING AROUND A LOT MORE THAN USUAL: NOT AT ALL
SUM OF ALL RESPONSES TO PHQ QUESTIONS 1-9: 0
3. TROUBLE FALLING OR STAYING ASLEEP: NOT AT ALL
6. FEELING BAD ABOUT YOURSELF - OR THAT YOU ARE A FAILURE OR HAVE LET YOURSELF OR YOUR FAMILY DOWN: NOT AT ALL
9. THOUGHTS THAT YOU WOULD BE BETTER OFF DEAD, OR OF HURTING YOURSELF: NOT AT ALL
10. IF YOU CHECKED OFF ANY PROBLEMS, HOW DIFFICULT HAVE THESE PROBLEMS MADE IT FOR YOU TO DO YOUR WORK, TAKE CARE OF THINGS AT HOME, OR GET ALONG WITH OTHER PEOPLE: NOT DIFFICULT AT ALL
1. LITTLE INTEREST OR PLEASURE IN DOING THINGS: NOT AT ALL
4. FEELING TIRED OR HAVING LITTLE ENERGY: NOT AT ALL
SUM OF ALL RESPONSES TO PHQ QUESTIONS 1-9: 0
2. FEELING DOWN, DEPRESSED OR HOPELESS: NOT AT ALL
SUM OF ALL RESPONSES TO PHQ QUESTIONS 1-9: 0
SUM OF ALL RESPONSES TO PHQ QUESTIONS 1-9: 0
5. POOR APPETITE OR OVEREATING: NOT AT ALL

## 2025-04-24 NOTE — PROGRESS NOTES
Future  Other fatigue  -     CBC with Auto Differential; Future  -     Ferritin; Future  -     Iron and TIBC; Future  -     Vitamin B12 & Folate; Future  -     Vitamin D 25 Hydroxy; Future  Class 3 severe obesity with serious comorbidity and body mass index (BMI) of 40.0 to 44.9 in adult, unspecified obesity type (HCC)  Encounter for screening mammogram for malignant neoplasm of breast  -     PERRY DIGITAL SCREEN W CAD BILATERAL PER PROTOCOL; Future  Colon cancer screening  -     Fecal DNA Colorectal cancer screening (Cologuard)    Assessment & Plan  1. Hypotension.  - Reports experiencing low blood pressure at work, with readings ranging from 70s to 90s systolic.  - Suspects it might be due to inadequate water intake and excessive caffeine consumption.  - Advised to discontinue lisinopril and monitor blood pressure closely.  - Further cardiac workup, including an EKG and possibly an echocardiogram, is recommended. She should inform her cardiologist about these symptoms.    2. Weight management.  - Interested in weight loss medications.  - GLP-1 agonists like Zepbound (similar to Mounjaro) are discussed as potential options, noting that they can cause nausea and other gastrointestinal side effects.  - Adipex, Contrave, and Qsymia were also discussed as alternative weight loss medications.  - Prefers to wait for lab results before starting any new medication.    3. Health maintenance.  - A comprehensive set of laboratory tests will be conducted, including A1c, cholesterol panel, thyroid function, kidney function, liver function, glucose, CBC, iron studies, ferritin, B12, and vitamin D levels.  - A mammogram and Cologuard test will be ordered.    Follow-up  - Follow up in 3 months.    No follow-ups on file.         The patient (or guardian, if applicable) and other individuals in attendance with the patient were advised that Artificial Intelligence will be utilized during this visit to record, process the conversation

## 2025-04-28 ENCOUNTER — RESULTS FOLLOW-UP (OUTPATIENT)
Dept: FAMILY MEDICINE CLINIC | Age: 60
End: 2025-04-28

## 2025-04-28 NOTE — RESULT ENCOUNTER NOTE
Cris's labs are normal except her cholesterol is high. Recommend starting atorvastatin to lower CV risks 20mg daily.

## 2025-05-12 ENCOUNTER — OFFICE VISIT (OUTPATIENT)
Dept: ORTHOPEDIC SURGERY | Age: 60
End: 2025-05-12
Payer: COMMERCIAL

## 2025-05-12 VITALS — WEIGHT: 293 LBS | BODY MASS INDEX: 53.76 KG/M2 | OXYGEN SATURATION: 98 % | HEART RATE: 84 BPM

## 2025-05-12 DIAGNOSIS — M17.12 PRIMARY OSTEOARTHRITIS OF LEFT KNEE: Primary | ICD-10-CM

## 2025-05-12 PROCEDURE — 99213 OFFICE O/P EST LOW 20 MIN: CPT | Performed by: STUDENT IN AN ORGANIZED HEALTH CARE EDUCATION/TRAINING PROGRAM

## 2025-05-12 ASSESSMENT — ENCOUNTER SYMPTOMS
SORE THROAT: 0
COLOR CHANGE: 0
SHORTNESS OF BREATH: 0
VOICE CHANGE: 0
NAUSEA: 0
COUGH: 0
BACK PAIN: 0
VOMITING: 0
WHEEZING: 0

## 2025-05-12 NOTE — PROGRESS NOTES
5/12/2025   Chief Complaint   Patient presents with    Injections      Updated HPI: Patient returns today for reevaluation of her left knee as well as requesting cortisone injection.  No new injuries or complaints.  No changes in her health history.  Continues to have significant left knee pain.    Previous HPI (2/10/2025): Patient returns for reevaluation of her left knee.  The most recent injection provided only about 6 weeks of relief but it was substantial.  No new injuries or complaints or changes in her health history.    Previous HPI (11/11/2024): Patient returns today for reevaluation of her left knee.  No new injuries or complaints.  Still gets good relief from injection but it lasted approximately 2 months this time and did not give her as good relief but still significant relief.  No complications from previous injections.    Previous HPI (3/20/2024): Patient returns today for reevaluation of her left knee as well as requesting cortisone injection.  She states that the injection was extremely helpful and she is looking for another injection today.  No complications from injection.  No changes in her health history or painful events or injuries since last being seen.  She denies any constitutional symptoms today    Previous HPI (10/16/2023)                             Cris Gama is a 59 y.o. female  referred by self, previously seen for shoulder by myself and here today for evaluation and treatment of left knee pain.  Patient denies any specific left knee injury but states she has had intermittent pain for approximately 10 years.  She states that she had a recent flareup over the summer and that her knee pain is been relatively consistent since then.  She has pain around the patella as well as the medial joint line.  She states she has had a previous cortisone injection by an outside physician several years ago and this was very helpful and she is requesting another injection today.  Again, no

## 2025-06-06 LAB — NONINV COLON CA DNA+OCC BLD SCRN STL QL: NEGATIVE

## 2025-06-15 ENCOUNTER — PATIENT MESSAGE (OUTPATIENT)
Dept: FAMILY MEDICINE CLINIC | Age: 60
End: 2025-06-15

## 2025-06-15 DIAGNOSIS — I10 ESSENTIAL HYPERTENSION: ICD-10-CM

## 2025-06-15 DIAGNOSIS — M25.551 PAIN OF BOTH HIP JOINTS: ICD-10-CM

## 2025-06-15 DIAGNOSIS — M25.552 PAIN OF BOTH HIP JOINTS: ICD-10-CM

## 2025-06-16 RX ORDER — TRAMADOL HYDROCHLORIDE 50 MG/1
50 TABLET ORAL EVERY 12 HOURS
Qty: 60 TABLET | Refills: 0 | Status: SHIPPED | OUTPATIENT
Start: 2025-06-16 | End: 2025-07-16

## 2025-06-17 ENCOUNTER — TELEPHONE (OUTPATIENT)
Dept: FAMILY MEDICINE CLINIC | Age: 60
End: 2025-06-17

## 2025-06-17 RX ORDER — LISINOPRIL 10 MG/1
10 TABLET ORAL DAILY
Qty: 90 TABLET | Refills: 1 | Status: SHIPPED | OUTPATIENT
Start: 2025-06-17 | End: 2025-12-14

## 2025-06-17 NOTE — TELEPHONE ENCOUNTER
Continue home meds Amlodipine 10 mg and Toprol XL 50 mg   Monique with [facility] in [place] inquired on why patient was only getting her Tramadol filled there - she will contact patient to ask why - she feels it dangerous because they cannot see all medications she is having filled.

## 2025-06-29 DIAGNOSIS — I10 ESSENTIAL HYPERTENSION: ICD-10-CM

## 2025-06-30 RX ORDER — LISINOPRIL 10 MG/1
10 TABLET ORAL DAILY
Qty: 90 TABLET | Refills: 0 | OUTPATIENT
Start: 2025-06-30

## 2025-07-18 DIAGNOSIS — G56.03 BILATERAL CARPAL TUNNEL SYNDROME: ICD-10-CM

## 2025-07-21 RX ORDER — GABAPENTIN 300 MG/1
300 CAPSULE ORAL NIGHTLY
Qty: 30 CAPSULE | Refills: 5 | Status: SHIPPED | OUTPATIENT
Start: 2025-07-21 | End: 2026-01-17

## 2025-07-24 ENCOUNTER — OFFICE VISIT (OUTPATIENT)
Dept: FAMILY MEDICINE CLINIC | Age: 60
End: 2025-07-24
Payer: COMMERCIAL

## 2025-07-24 VITALS
WEIGHT: 293 LBS | SYSTOLIC BLOOD PRESSURE: 124 MMHG | OXYGEN SATURATION: 98 % | HEIGHT: 62 IN | BODY MASS INDEX: 53.92 KG/M2 | DIASTOLIC BLOOD PRESSURE: 80 MMHG | HEART RATE: 56 BPM

## 2025-07-24 DIAGNOSIS — E66.01 OBESITY, MORBID (HCC): Primary | ICD-10-CM

## 2025-07-24 DIAGNOSIS — E78.5 HYPERLIPIDEMIA, UNSPECIFIED HYPERLIPIDEMIA TYPE: ICD-10-CM

## 2025-07-24 DIAGNOSIS — I10 ESSENTIAL HYPERTENSION: ICD-10-CM

## 2025-07-24 DIAGNOSIS — M17.12 PRIMARY OSTEOARTHRITIS OF LEFT KNEE: ICD-10-CM

## 2025-07-24 DIAGNOSIS — K75.3 GRANULOMATOUS HEPATITIS: ICD-10-CM

## 2025-07-24 DIAGNOSIS — I47.10 PSVT (PAROXYSMAL SUPRAVENTRICULAR TACHYCARDIA): ICD-10-CM

## 2025-07-24 PROCEDURE — 99213 OFFICE O/P EST LOW 20 MIN: CPT | Performed by: STUDENT IN AN ORGANIZED HEALTH CARE EDUCATION/TRAINING PROGRAM

## 2025-07-24 PROCEDURE — 3079F DIAST BP 80-89 MM HG: CPT | Performed by: STUDENT IN AN ORGANIZED HEALTH CARE EDUCATION/TRAINING PROGRAM

## 2025-07-24 PROCEDURE — 3074F SYST BP LT 130 MM HG: CPT | Performed by: STUDENT IN AN ORGANIZED HEALTH CARE EDUCATION/TRAINING PROGRAM

## 2025-07-24 RX ORDER — ROSUVASTATIN CALCIUM 5 MG/1
5 TABLET, COATED ORAL NIGHTLY
Qty: 30 TABLET | Refills: 3 | Status: SHIPPED | OUTPATIENT
Start: 2025-07-24

## 2025-07-24 NOTE — PROGRESS NOTES
8/1/2025    Cris Gama    Chief Complaint   Patient presents with    3 Month Follow-Up     Pain of both hip joints,IFG, HTN       HPI  History of Present Illness  The patient presents for evaluation of knee pain, elevated LDL, and weight management.    She reports persistent knee pain, which has not improved following her last injection administered by Dr. Mcgowan. She plans to inform him of this as previous injections provided relief for approximately 8 to 9 weeks. Dr. Mcgowan has suggested nerve ablation as a potential treatment option. He also mentioned gel injections but noted that they are typically used for concavities, which is not her condition. She is considering trying the gel injections and will discuss this with Dr. Mcgowan to determine if it is covered by her insurance. Her next appointment with Dr. Mcgowan is scheduled for 08/11/2025.    She expresses concern about her liver health and the potential impact of statins on it. She has never been prescribed statins. She recalls being prescribed a powder medication by Dr. Black several years ago but reports inconsistent use. Her last consultation with a gastroenterologist was in either 10/2016 or 01/2017, after which she continued follow-ups until her liver enzymes normalized.    She is interested in exploring weight loss medications that do not induce nausea. She has experienced severe nausea with Zepbound or Mounjaro in the past.    She has been experiencing episodes of low blood pressure and dizziness. She has been managing these symptoms with a 10 mg dose of lisinopril, which appears to be effective.    Social History:  Occupations: Travel nurse      1. Obesity, morbid (HCC)    2. Hyperlipidemia, unspecified hyperlipidemia type    3. Granulomatous hepatitis    4. Essential hypertension    5. possible PSVT (paroxysmal supraventricular tachycardia) (HCC)- once in 6 month, on pulse ox 160 bpm for 20 min and resolved on its own ( pat is a nurse by

## 2025-08-01 ASSESSMENT — ENCOUNTER SYMPTOMS
NAUSEA: 0
ABDOMINAL PAIN: 0
WHEEZING: 0
SHORTNESS OF BREATH: 0
SORE THROAT: 0

## 2025-08-11 ENCOUNTER — OFFICE VISIT (OUTPATIENT)
Dept: ORTHOPEDIC SURGERY | Age: 60
End: 2025-08-11
Payer: COMMERCIAL

## 2025-08-11 VITALS — HEART RATE: 57 BPM | OXYGEN SATURATION: 96 % | WEIGHT: 293 LBS | BODY MASS INDEX: 54.5 KG/M2

## 2025-08-11 DIAGNOSIS — M17.12 PRIMARY OSTEOARTHRITIS OF LEFT KNEE: Primary | ICD-10-CM

## 2025-08-11 PROCEDURE — 20610 DRAIN/INJ JOINT/BURSA W/O US: CPT | Performed by: STUDENT IN AN ORGANIZED HEALTH CARE EDUCATION/TRAINING PROGRAM

## 2025-08-11 PROCEDURE — 99214 OFFICE O/P EST MOD 30 MIN: CPT | Performed by: STUDENT IN AN ORGANIZED HEALTH CARE EDUCATION/TRAINING PROGRAM

## 2025-08-11 RX ORDER — TRIAMCINOLONE ACETONIDE 40 MG/ML
80 INJECTION, SUSPENSION INTRA-ARTICULAR; INTRAMUSCULAR ONCE
Status: COMPLETED | OUTPATIENT
Start: 2025-08-11 | End: 2025-08-11

## 2025-08-11 RX ADMIN — TRIAMCINOLONE ACETONIDE 80 MG: 40 INJECTION, SUSPENSION INTRA-ARTICULAR; INTRAMUSCULAR at 11:52

## 2025-08-11 ASSESSMENT — ENCOUNTER SYMPTOMS
SHORTNESS OF BREATH: 0
COLOR CHANGE: 0
WHEEZING: 0
COUGH: 0
SORE THROAT: 0
BACK PAIN: 0
NAUSEA: 0
VOICE CHANGE: 0
VOMITING: 0

## 2025-08-14 ENCOUNTER — PATIENT MESSAGE (OUTPATIENT)
Dept: FAMILY MEDICINE CLINIC | Age: 60
End: 2025-08-14

## 2025-08-14 DIAGNOSIS — M25.552 PAIN OF BOTH HIP JOINTS: ICD-10-CM

## 2025-08-14 DIAGNOSIS — M25.551 PAIN OF BOTH HIP JOINTS: ICD-10-CM

## 2025-08-15 RX ORDER — TRAMADOL HYDROCHLORIDE 50 MG/1
50 TABLET ORAL EVERY 12 HOURS
Qty: 60 TABLET | Refills: 0 | Status: SHIPPED | OUTPATIENT
Start: 2025-08-15 | End: 2025-09-14

## (undated) DEVICE — 1315 FOAM STRIP NEEDLE COUNTER: Brand: DEVON

## (undated) DEVICE — GLOVE SURG SZ 8 L12IN THK75MIL DK GRN LTX FREE

## (undated) DEVICE — GAUZE,SPONGE,2"X2",8PLY,STERILE,LF,2'S: Brand: MEDLINE

## (undated) DEVICE — GOWN,SIRUS,POLYRNF,BRTHSLV,XLN/XL,20/CS: Brand: MEDLINE

## (undated) DEVICE — NEEDLE,25GX1.5",REG,BEVEL: Brand: MEDLINE

## (undated) DEVICE — SYRINGE 20ML LL S/C 50

## (undated) DEVICE — ZIMMER® STERILE DISPOSABLE TOURNIQUET CUFF WITH PLC, DUAL PORT, SINGLE BLADDER, 18 IN. (46 CM)

## (undated) DEVICE — CODMAN® SURGICAL PATTIES 1/2" X 3" (1.27CM X 7.62CM): Brand: CODMAN®

## (undated) DEVICE — GAUZE,SPONGE,4"X4",16PLY,XRAY,STRL,LF: Brand: MEDLINE

## (undated) DEVICE — BLADE 1884016HR RAD60 5PK M4 4MM ROTATE: Brand: RAD

## (undated) DEVICE — BANDAGE,GAUZE,CONFORMING,3"X75",STRL,LF: Brand: MEDLINE

## (undated) DEVICE — GLOVE ORANGE PI 7 1/2   MSG9075

## (undated) DEVICE — LINER,SEMI-RIGID,3000CC,50EA/CS: Brand: MEDLINE

## (undated) DEVICE — PADDING CAST W3INXL4YD COT BLEND MIC PLEAT UNDERCAST SPEC

## (undated) DEVICE — SOLUTION IV 1000ML 0.9% SOD CHL FOR IRRIG PLAS CONT

## (undated) DEVICE — STERILE LATEX POWDER-FREE SURGICAL GLOVESWITH NITRILE COATING: Brand: PROTEXIS

## (undated) DEVICE — STRIP,CLOSURE,WOUND,MEDI-STRIP,1/2X4: Brand: MEDLINE

## (undated) DEVICE — SPONGE GZ W4XL8IN COT WVN 12 PLY

## (undated) DEVICE — SYRINGE, LUER LOCK, 10ML: Brand: MEDLINE

## (undated) DEVICE — SUTURE ETHLN SZ 3-0 L30IN NONABSORBABLE BLK FS-1 L24MM 3/8 669H

## (undated) DEVICE — INTENDED FOR TISSUE SEPARATION, AND OTHER PROCEDURES THAT REQUIRE A SHARP SURGICAL BLADE TO PUNCTURE OR CUT.: Brand: BARD-PARKER ® STAINLESS STEEL BLADES

## (undated) DEVICE — COVER,MAYO STAND,STERILE: Brand: MEDLINE

## (undated) DEVICE — MARKER SURG SKIN UTIL REGULAR/FINE 2 TIP W/ RUL AND 9 LBL

## (undated) DEVICE — TUBING, SUCTION, 3/16" X 10', STRAIGHT: Brand: MEDLINE

## (undated) DEVICE — SYRINGE MED 10ML LUERLOCK TIP W/O SFTY DISP

## (undated) DEVICE — DBD-PACK,EENT,SIRUS,PK I: Brand: MEDLINE

## (undated) DEVICE — SOLUTION IV IRRIG POUR BRL 0.9% SODIUM CHL 2F7124

## (undated) DEVICE — COUNTER NDL 60 COUNT FOAM STRP SGL MAG

## (undated) DEVICE — CONTROL SYRINGE LUER-LOCK TIP: Brand: MONOJECT

## (undated) DEVICE — APPLICATOR MEDICATED 26 CC SOLUTION HI LT ORNG CHLORAPREP

## (undated) DEVICE — GOWN,SIRUS,FABRNF,RAGLAN,XL,ST,28/CS: Brand: MEDLINE

## (undated) DEVICE — SOLUTION IV IRRIG WATER 1000ML POUR BRL 2F7114

## (undated) DEVICE — MARKER,SKIN,WI/RULER AND LABELS: Brand: MEDLINE

## (undated) DEVICE — ELECTRODE ES AD CRDLSS PT RET REM POLYHESIVE

## (undated) DEVICE — DISCONTINUED USE 394574 SYSTEM PATIENT POSITION PINK 9IN DONUT

## (undated) DEVICE — BLADE SURG NO15 C STL SHRP PREM

## (undated) DEVICE — SUTURE PERMAHAND SZ 2-0 L18IN NONABSORBABLE BLK L26MM FS 685G

## (undated) DEVICE — PADDING,UNDERCAST,COTTON, 3X4YD STERILE: Brand: MEDLINE

## (undated) DEVICE — GLOVE SURG SZ 7 CRM LTX FREE POLYISOPRENE POLYMER BEAD ANTI

## (undated) DEVICE — BLADE 1884004 TRICUT 5PK 4MM: Brand: TRICUT®

## (undated) DEVICE — NDLCTR: FOAM STRIP/MAG 15CT 168/CS: Brand: MEDICAL ACTION INDUSTRIES

## (undated) DEVICE — CONTAINER,SPECIMEN,OR STERILE,4OZ: Brand: MEDLINE

## (undated) DEVICE — GLOVE SURG SZ 65 THK91MIL LTX FREE SYN POLYISOPRENE

## (undated) DEVICE — DRAPE,U/ SHT,SPLIT,PLAS,STERIL: Brand: MEDLINE

## (undated) DEVICE — SUTURE PERMAHAND SZ 2-0 L30IN NONABSORBABLE BLK L60MM KS 623H

## (undated) DEVICE — Device

## (undated) DEVICE — DRAPE,EXTREMITY,89X128,STERILE: Brand: MEDLINE

## (undated) DEVICE — CORD ES L15FT PT RET REUSE VALLEYLAB REM

## (undated) DEVICE — KIT,ANTI FOG,W/SPONGE & FLUID,SOFT PACK: Brand: MEDLINE

## (undated) DEVICE — DRAPE SHEET ULTRAGARD: Brand: MEDLINE

## (undated) DEVICE — ELECTROSURGICAL SUCTION COAGULATOR 10FR

## (undated) DEVICE — TOWEL,OR,DSP,ST,BLUE,STD,6/PK,12PK/CS: Brand: MEDLINE

## (undated) DEVICE — APPLICATOR MEDICATED L4IN PVP IOD SAT PREP SOL PD SWABSTK

## (undated) DEVICE — CURITY STRETCH BANDAGE: Brand: CURITY

## (undated) DEVICE — PENCIL ES CRD L10FT HND SWCHING ROCK SWCH W/ EDGE COAT BLDE

## (undated) DEVICE — DECANTER FLD 9IN ST BG FOR ASEP TRNSF OF FLD

## (undated) DEVICE — STANDARD 8CM: Brand: NASOPORE

## (undated) DEVICE — CONMED ACCESSORY ELECTRODE, NEEDLE WITH EXTENDED INSULATION: Brand: CONMED

## (undated) DEVICE — MASTISOL ADHESIVE LIQ 2/3ML

## (undated) DEVICE — GOWN,ECLIPSE,POLYRNF,BRTHSLV,XL,30/CS: Brand: MEDLINE

## (undated) DEVICE — KIT ANTIFOG SOL 6GM IPA DISP FOR ENDOSCP PROC FRED DEXIDE

## (undated) DEVICE — GAUZE,PACKING STRIP,PLAIN,1/2"X5YD,STRL: Brand: CURAD

## (undated) DEVICE — DRESSING,GAUZE,XEROFORM,CURAD,1"X8",ST: Brand: CURAD

## (undated) DEVICE — TRAP,MUCUS SPECIMEN,40CC: Brand: MEDLINE

## (undated) DEVICE — SKIN MARKER REGULAR TIP WITH RULER CAP AND LABELS: Brand: DEVON

## (undated) DEVICE — TURBINATOR WAND: Brand: COBLATION

## (undated) DEVICE — HOLSTER ES DISP FOR FLAT SURF TO SHLD SUCT COAG VALLEYLAB

## (undated) DEVICE — STANDARD HYPODERMIC NEEDLE,POLYPROPYLENE HUB: Brand: MONOJECT

## (undated) DEVICE — CIRCUIT BREATHING SINGLE LIMB AD 72 IN 3 LT 2 FILTER LIMBO

## (undated) DEVICE — CURITY NON-ADHERENT STRIPS: Brand: CURITY

## (undated) DEVICE — LINER SUCT CANSTR 1500CC SEMI RIG W/ POR HYDROPHOBIC SHUT

## (undated) DEVICE — PACKING 8004007 NEURAY 200PK 13X76MM: Brand: NEURAY ®

## (undated) DEVICE — GOWN,SIRUS,FABRNF,RAGLAN,L,ST,30/CS: Brand: MEDLINE

## (undated) DEVICE — SPLINT 1527000 10PK PAIR REUTER NASL THN

## (undated) DEVICE — PACK,BASIC,SIRUS,V: Brand: MEDLINE

## (undated) DEVICE — GLOVE SURG SZ 7 L12IN FNGR THK79MIL GRN LTX FREE

## (undated) DEVICE — 3M™ STERI-STRIP™ REINFORCED ADHESIVE SKIN CLOSURES, R1547, 1/2 IN X 4 IN (12 MM X 100 MM), 6 STRIPS/ENVELOPE: Brand: 3M™ STERI-STRIP™

## (undated) DEVICE — BLADE 1884006 RAD40 5PK SINUS 4MM: Brand: RAD®

## (undated) DEVICE — DISCONTINUED USE 111569 BF APPLICATOR COTN TIP 6IN ST